# Patient Record
Sex: FEMALE | Race: WHITE | NOT HISPANIC OR LATINO | Employment: OTHER | URBAN - NONMETROPOLITAN AREA
[De-identification: names, ages, dates, MRNs, and addresses within clinical notes are randomized per-mention and may not be internally consistent; named-entity substitution may affect disease eponyms.]

---
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
## 2020-05-26 ENCOUNTER — TELEPHONE (OUTPATIENT)
Dept: FAMILY MEDICINE | Facility: OTHER | Age: 20
End: 2020-05-26

## 2020-05-26 DIAGNOSIS — L23.7 CONTACT DERMATITIS DUE TO POISON IVY: Primary | ICD-10-CM

## 2020-05-26 RX ORDER — PREDNISONE 20 MG/1
TABLET ORAL
Qty: 30 TABLET | Refills: 0 | Status: SHIPPED | OUTPATIENT
Start: 2020-05-26 | End: 2020-10-30

## 2020-05-26 NOTE — TELEPHONE ENCOUNTER
Started two nights ago, spreading now, using calamine lotion, not helping, is all over body.  Does not have insurance right now.  Lora Shepherd LPN ...... 5/26/2020 2:28 PM

## 2020-05-26 NOTE — TELEPHONE ENCOUNTER
Sent prednisone to [facility] pharmacy for treatment. Please take in the morning.   Return to clinic with change/worsening of symptoms.   Gemini Alvarado PA-C ..................5/26/2020 2:34 PM

## 2020-09-24 ENCOUNTER — ALLIED HEALTH/NURSE VISIT (OUTPATIENT)
Dept: FAMILY MEDICINE | Facility: OTHER | Age: 20
End: 2020-09-24
Payer: OTHER GOVERNMENT

## 2020-09-24 DIAGNOSIS — Z20.822 ENCOUNTER FOR LABORATORY TESTING FOR COVID-19 VIRUS: Primary | ICD-10-CM

## 2020-09-24 PROCEDURE — U0003 INFECTIOUS AGENT DETECTION BY NUCLEIC ACID (DNA OR RNA); SEVERE ACUTE RESPIRATORY SYNDROME CORONAVIRUS 2 (SARS-COV-2) (CORONAVIRUS DISEASE [COVID-19]), AMPLIFIED PROBE TECHNIQUE, MAKING USE OF HIGH THROUGHPUT TECHNOLOGIES AS DESCRIBED BY CMS-2020-01-R: HCPCS | Mod: ZL

## 2020-09-24 PROCEDURE — C9803 HOPD COVID-19 SPEC COLLECT: HCPCS

## 2020-09-24 NOTE — PROGRESS NOTES
Patient swabbed for COVID-19 testing for fever and chills  Sergio Azar LPN on 9/24/2020 at 11:41 AM

## 2020-09-25 LAB
SARS-COV-2 RNA SPEC QL NAA+PROBE: NOT DETECTED
SPECIMEN SOURCE: NORMAL

## 2020-10-24 NOTE — TELEPHONE ENCOUNTER
Patient Information     Patient Name MRN Mya Clark [id number] Female 2000      Telephone Encounter by Wandy Valentine MD at 2017  4:30 PM     Author:  Wandy Valentine MD Service:  (none) Author Type:  Physician     Filed:  2017  4:30 PM Encounter Date:  2017 Status:  Signed     :  Wandy Valentine MD (Physician)            Please call mom and let her know she needs to be seen again.  Signed by Wandy Valentine MD .....2017 4:30 PM              numerical 0-10 No

## 2020-10-30 ENCOUNTER — OFFICE VISIT (OUTPATIENT)
Dept: FAMILY MEDICINE | Facility: OTHER | Age: 20
End: 2020-10-30
Attending: PHYSICIAN ASSISTANT
Payer: COMMERCIAL

## 2020-10-30 VITALS
BODY MASS INDEX: 23.59 KG/M2 | HEIGHT: 65 IN | DIASTOLIC BLOOD PRESSURE: 86 MMHG | TEMPERATURE: 97 F | RESPIRATION RATE: 16 BRPM | SYSTOLIC BLOOD PRESSURE: 122 MMHG | WEIGHT: 141.6 LBS | OXYGEN SATURATION: 97 % | HEART RATE: 73 BPM

## 2020-10-30 DIAGNOSIS — Z01.419 PAP TEST, AS PART OF ROUTINE GYNECOLOGICAL EXAMINATION: Primary | ICD-10-CM

## 2020-10-30 DIAGNOSIS — F41.9 SEVERE ANXIETY: ICD-10-CM

## 2020-10-30 PROCEDURE — 99214 OFFICE O/P EST MOD 30 MIN: CPT | Performed by: PHYSICIAN ASSISTANT

## 2020-10-30 PROCEDURE — 88142 CYTOPATH C/V THIN LAYER: CPT | Performed by: PHYSICIAN ASSISTANT

## 2020-10-30 PROCEDURE — G0123 SCREEN CERV/VAG THIN LAYER: HCPCS | Performed by: PHYSICIAN ASSISTANT

## 2020-10-30 RX ORDER — ESCITALOPRAM OXALATE 5 MG/1
5 TABLET ORAL DAILY
Qty: 30 TABLET | Refills: 1 | Status: SHIPPED | OUTPATIENT
Start: 2020-10-30 | End: 2020-12-03

## 2020-10-30 SDOH — HEALTH STABILITY: MENTAL HEALTH: HOW MANY STANDARD DRINKS CONTAINING ALCOHOL DO YOU HAVE ON A TYPICAL DAY?: NOT ASKED

## 2020-10-30 SDOH — HEALTH STABILITY: MENTAL HEALTH: HOW OFTEN DO YOU HAVE A DRINK CONTAINING ALCOHOL?: MONTHLY OR LESS

## 2020-10-30 SDOH — HEALTH STABILITY: MENTAL HEALTH: HOW OFTEN DO YOU HAVE 6 OR MORE DRINKS ON ONE OCCASION?: NOT ASKED

## 2020-10-30 ASSESSMENT — PATIENT HEALTH QUESTIONNAIRE - PHQ9
SUM OF ALL RESPONSES TO PHQ QUESTIONS 1-9: 6
5. POOR APPETITE OR OVEREATING: NEARLY EVERY DAY

## 2020-10-30 ASSESSMENT — ANXIETY QUESTIONNAIRES
2. NOT BEING ABLE TO STOP OR CONTROL WORRYING: NEARLY EVERY DAY
1. FEELING NERVOUS, ANXIOUS, OR ON EDGE: NEARLY EVERY DAY
6. BECOMING EASILY ANNOYED OR IRRITABLE: NEARLY EVERY DAY
5. BEING SO RESTLESS THAT IT IS HARD TO SIT STILL: NEARLY EVERY DAY
GAD7 TOTAL SCORE: 21
7. FEELING AFRAID AS IF SOMETHING AWFUL MIGHT HAPPEN: NEARLY EVERY DAY
3. WORRYING TOO MUCH ABOUT DIFFERENT THINGS: NEARLY EVERY DAY
IF YOU CHECKED OFF ANY PROBLEMS ON THIS QUESTIONNAIRE, HOW DIFFICULT HAVE THESE PROBLEMS MADE IT FOR YOU TO DO YOUR WORK, TAKE CARE OF THINGS AT HOME, OR GET ALONG WITH OTHER PEOPLE: EXTREMELY DIFFICULT

## 2020-10-30 ASSESSMENT — PAIN SCALES - GENERAL: PAINLEVEL: NO PAIN (0)

## 2020-10-30 ASSESSMENT — MIFFLIN-ST. JEOR: SCORE: 1408.17

## 2020-10-30 NOTE — PROGRESS NOTES
"Nursing Notes:   Anny Mc  10/30/2020  8:15 AM  Addendum  Chief Complaint   Patient presents with     Gyn Exam     Patient presents to clinic for pap. She also states she would like to discuss anxiety medication and the implant in her arm.     Initial /86 (BP Location: Right arm, Patient Position: Sitting, Cuff Size: Adult Regular)   Pulse 73   Temp 97  F (36.1  C) (Tympanic)   Resp 16   Ht 1.651 m (5' 5\")   Wt 64.2 kg (141 lb 9.6 oz)   SpO2 97%   Breastfeeding No   BMI 23.56 kg/m   Estimated body mass index is 23.56 kg/m  as calculated from the following:    Height as of this encounter: 1.651 m (5' 5\").    Weight as of this encounter: 64.2 kg (141 lb 9.6 oz).    Medication Reconciliation: complete      Gemini Alvarado PA-C        HPI: Mya Garcia who presents for a pap exam.  Concerns include: Has a nexplanon that was placed 11/2/2018.  Interested in possibly having it removed.  She is concerned that it may be increasing her anxiety.  Patient did have history of anxiety prior to the Nexplanon placed.  Unsure if it is exacerbating her anxiety.  Periods are irregular with the Nexplanon.    Patient is history of anxiety.  Previously was on sertraline and fluoxetine.  Feels like her mind does not shut off.  Constantly racing.  Has a panic attack a few times per month.  Able to breathe through the panic attacks.  Feels constantly worried.  No depressed thoughts.  No suicidal or homicidal ideation.  Keeping food and fluids down.    No LMP recorded.   Contraception: Has a nexplanon that was placed 11/2/2018  Risk for STI?: no concerns  Last pap: none, needs 1  Any hx of abnormal paps:  none  FH of early CA?: no  Cholesterol/DM concerns/screening: no history of screening, declines blood work today  Tobacco?: none  Calcium intake: dairy free, MTV  Immunizations: declines flu vaccine today    Patient Active Problem List    Diagnosis Date Noted     Anxiety state 01/14/2011     Priority: Medium " "      Past Medical History:   Diagnosis Date     Anxiety disorder      Pneumonia     History of left lower lobe pneumonia times two.       Past Surgical History:   Procedure Laterality Date     NO HISTORY OF SURGERY         Family History   Problem Relation Age of Onset     Other - See Comments Mother         Psychiatric illness,depression and anxiety     Anxiety Disorder Father      Anxiety Disorder Maternal Grandmother      Cancer Maternal Grandfather        Social History     Tobacco Use     Smoking status: Never Smoker     Smokeless tobacco: Never Used   Substance Use Topics     Alcohol use: Yes     Frequency: Monthly or less       Current Outpatient Medications   Medication Sig Dispense Refill     escitalopram (LEXAPRO) 5 MG tablet Take 1 tablet (5 mg) by mouth daily 30 tablet 1       No Known Allergies    REVIEW OF SYSTEMS:  Refer to HPI.    PHYSICAL EXAM:  /86 (BP Location: Right arm, Patient Position: Sitting, Cuff Size: Adult Regular)   Pulse 73   Temp 97  F (36.1  C) (Tympanic)   Resp 16   Ht 1.651 m (5' 5\")   Wt 64.2 kg (141 lb 9.6 oz)   SpO2 97%   Breastfeeding No   BMI 23.56 kg/m    CONSTITUTIONAL:  Alert,cooperative, NAD.  BREASTS: No skin abnormalities, no erythema.  No discrete masses.  No nipple discharge, no axillary, supra- or infraclavicular LA.   CARDIOVASCULAR: Regular,S1, S2.  No S3 or S4.  No murmur/gallop/rub.  No peripheral edema.  RESPIRATORY: CTA bilaterally, no wheezes, rhonchi or rales.  GI: Bowel sounds wnl.  Soft, nontender, nondistended.  No masses or HSM.  No rebound or guarding.  : Vulva: normal, no lesions or discharge  Urethral meatus: normal size and location, no lesions or discharge  Urethra: no tenderness or masses  Bladder: no fullness or tenderness  Vagina: normal appearance, no abnormal discharge, no lesions.  No evidence of cystocele or rectocele.  Cervix: normal appearance, no lesions, no abnormal discharge, no cervical motion tenderness  Uterus: normal " size and position, mobile, non-tender  Adnexa: no palpable masses bilaterally. No cervical motion tenderness.  Pap smear obtained: yes  MSKEL: Grossly normal ROM.  No clubbing.  INTEGUMENTARY:  Warm, dry.  No rash noted on exposed skin.  NEUROLOGIC: Facies symmetric.  Grossly normal movement and tone.  No tremor.  PSYCHIATRIC: Affect normal.  Speech fluent.   General appearance:appropriately dressed and well groomed  Attitude: cooperative  Behavior: normal  Eye Contact: normal  Speech: normal  Orientation: oriented to person, place, time and situation  Mood:  admits no sadness and moderate to severe anxiety  Affect: Mood Congruent  Thought Process: clear  Suicidal or Homicidal Ideation: reports no thoughts or intentions  Hallucination: no     PHQ Depression Screen  PHQ-9 SCORE 5/23/2016 4/4/2018 10/30/2020   PHQ-9 Total Score 5 5 6     CINDI-7 SCORE 8/28/2017 4/4/2018 10/30/2020   Total Score 17 4 21         Labs:  No results found for any visits on 10/30/20.    ASSESSMENT AND PLAN:      ICD-10-CM    1. Pap test, as part of routine gynecological examination  Z01.419 Pap Screen Thin Prep only - recommended age 21 - 24 years   2. Severe anxiety  F41.9 escitalopram (LEXAPRO) 5 MG tablet         Completed Pap test for cervical cancer screening.  Results are pending.    Discussed Nexplanon at length.  Patient would prefer to leave the Nexplanon in place at this time.  She will let us know she would like to switch to a different form of birth control.    Anxiety:  Started on lexapro.  Gave side effect profile.  Encouraged good diet and exercise.   Encouraged to see a counselor.   Return in 4-6 weeks for recheck.   Return to clinic with change/worsening of symptoms.     Greater than 25 minutes were spent in counseling and coordination of care.     Relevant cancer screening discussed.    Counseled on healthy diet, Calcium and vitamin D intake, and exercise.    Patient Instructions     Healthy Strategies  1. Eat at least 3  "meals a day and never skip breakfast.  2. Eat more slowly.  3. Decrease portion size.  4. Provide structure by using meal replacement bars or shakes, and/or low calorie frozen meals.  5. For good nutrition incorporate fruit, vegetables, whole grains, lean protein, and low-fat dairy.  6. Remove trigger foods from yourenvironment to avoid impulse eating.  7. Increase physical activity: get a pedometer and aim for 10,000 steps a day or 30-35 minutes of activity 5 days per week.  8. Weigh yourself daily or at least weekly.  9. Keep a record of what you eat and your activity.  10. Establish a support system such as afriend, group or program.    11. Read Viral Sherman's \"Eat to Live\". Remember it is important to have a minimum of 1200 calories a day, okay to use olive oil, 40 grams of fiber daily. No more than two servings (the size of your palm) of red meat a week.     Please consider the following general health recommendations:    Eat a quality diet (generally, low in simple sugars, starches, cholesterol and saturated fat.)    Please get 1200 mg of calcium in divided doses with 800 units vitamin D in your diet daily. Take supplements as needed to obtain full recommended amounts.     Stay physically active. Regular walking or other exercise is one of the best ways to minimize pain of arthritis; maintain independence and mobility; maintain bone strength; maintain conditioning of your heart. Find something you enjoy and a friend to do it with you.    Maintain ideal weight. Your Body mass index is Body mass index is 23.56 kg/m .. Generally a BMI of 20-25 is considered ideal. Overweight is defined as 25-30, obese is 30-35 and markedly obese is greater than 35.    Apply sun block (SPF 25 or greater) on exposed skin anytime you are out in the sun to prevent skin cancer.     Wear a seatbelt whenever you are in a car.    Obtain a flu shot every fall.    You should have a tetanus booster at least once every 10 years. - due summer " 2021    Check blood sugar annually. Cholesterol annually unless you have had a normal level when last checked within 5 years.     I recommend that you have a general physical exam every year. You should have a pap test every 3 years between the ages of 21 and 30 and every 3-5 years between the ages of 30 and 65 depending on your test unless you have had previous abnormal pap smears, (in these cases the exams and PAP's should be done on a schedule as recommended by your primary care provider). If you have had hysterectomy in the past, your future Pap plan may be different.       Anxiety:  Started on lexapro.  Encouraged good diet and exercise.   Encouraged to see a counselor.   Return in 4-6 weeks for recheck.   Return to clinic with change/worsening of symptoms.       Suicide Emergency First call for help:  [phone] [phone]    Depression: Tips to Help Yourself  As your healthcare providers help treat your depression, you can also help yourself. Keep in mind that your illness affects you emotionally, physically, mentally, and socially. So full recovery will take time. Take care of your body and your soul, and be patient with yourself as you get better.    Self-care    Educate yourself. Read about treatment and medicine options. If you have the energy, attend local conferences or support groups. Keep a list of useful websites and helpful books and use them as needed. This illness is not your fault. Don t blame yourself for your depression.    Manage early symptoms. If you notice symptoms returning, experience triggers, or identify other factors that may lead to a depressive episode, get help as soon as possible. Ask trusted friends and family to monitor your behavior and let you know if they see anything of concern.    Work with your provider. Find a provider you can trust. Communicate honestly with that person and share information on your treatment for depression and your reaction to medicines.    Be  prepared for a crisis. Know what to do if you experience a crisis. Keep the phone number of a crisis hotline and know the location of your community's urgent care centers and the closest emergency department.    Hold off on big decisions. Depression can cloud your judgment. So wait until you feel better before making major life decisions, such as changing jobs, moving, or getting  or .    Be patient. Recovering from depression is a process. Don t be discouraged if it takes some time to feel better.    Keep it simple. Depression saps your energy and concentration. So you won t be able to do all the things you used to do. Set small goals and do what you can.    Be with others. Don t isolate yourself--you ll only feel worse. Try to be with other people. And take part in fun activities when you can. Go to a movie, ballgame, Gnosticist service, or social event. Talk openly with people you can trust. And accept help when it s offered.  Take care of your body  People with depression often lose the desire to take care of themselves. That only makes their problems worse. During treatment and afterward, make a point to:    Exercise. It s a great way to take care of your body. And studies have shown that exercise helps fight depression.    Avoid drugs and alcohol. These may ease the pain in the short term. But they ll only make your problems worse in the long run.    Get relief from stress. Ask your healthcare provider for relaxation exercises and techniques to help relieve stress.    Eat right. A balanced and healthy diet helps keep your body healthy.  Date Last Reviewed: 1/1/2017 2000-2017 [employer]. [address] [place] [address], [place] [address]. All rights reserved. This information is not intended as a substitute for professional medical care. Always follow your healthcare professional's instructions.         Treating Anxiety Disorders with Therapy    If you have an anxiety disorder, you don t  have to suffer anymore. Treatment is available. Therapy (also called counseling) is often a helpful treatment for anxiety disorders. With therapy, a specially trained professional (therapist) helps you face and learn to manage your anxiety. Therapy can be short-term or long-term depending on your needs. In some cases, medicine may also be prescribed with therapy. It may take time before you notice how much therapy is helping, but stick with it. With therapy, you can feel better.  Cognitive behavioral therapy (CBT)  Cognitive behavioral therapy (CBT) teaches you to manage anxiety. It does this by helping you understand how you think and act when you re anxious. Research has shown CBT to be a very effective treatment for anxiety disorders. How CBT is run is almost like a class. It involves homework and activities to build skills that teach you to cope with anxiety step by step. It can be done in a group or one-on-one, and often takes place for a set number of sessions. CBT has two main parts:    Cognitive therapy helps you identify the negative, irrational thoughts that occur with your anxiety. You ll learn to replace these with more positive, realistic thoughts.    Behavioral therapy helps you change how you react to anxiety. You ll learn coping skills and methods for relaxing to help you better deal with anxiety.  Other forms of therapy  Other therapy methods may work better for you than CBT. Or, you may move from CBT to another form of therapy as your treatment needs change. This may mean meeting with a therapist by yourself or in a group. Therapy can also help you work through problems in your life, such as drug or alcohol dependence, that may be making your anxiety worse.  Getting better takes time  Therapy will help you feel better and teach you skills to help manage anxiety long term. But change doesn t happen right away. It takes a commitment from you. And treatment only works if you learn to face the causes  of your anxiety. So, you might feel worse before you feel better. This can sometimes make it hard to stick with it. But remember: Therapy is a very effective treatment. The results will be well worth it.  Helping yourself  If anxiety is wearing you down, here are some things you can do to cope:    Check with your doctor and rule out any physical problems that may be causing the anxiety symptoms.    If an anxiety disorder is diagnosed, seek mental healthcare. This is an illness and it can respond to treatment. Most types of anxiety disorders will respond to talk therapy and medicine.    Educate yourself about anxiety disorders. Keep track of helpful online resources and books you can use during stressful periods.    Try stress management techniques such as meditation.    Consider online or in-person support groups.    Don t fight your feelings. Anxiety feeds itself. The more you worry about it, the worse it gets. Instead, try to identify what might have triggered your anxiety. Then try to put this threat in perspective.    Keep in mind that you can t control everything about a situation. Change what you can and let the rest take its course.    Exercise -- it s a great way to relieve tension and help your body feel relaxed.    Examine your life for stress, and try to find ways to reduce it.    Avoid caffeine and nicotine, which can make anxiety symptoms worse.    Fight the temptation to turn to alcohol or unprescribed drugs for relief. They only make things worse in the long run.   Date Last Reviewed: 1/1/2017 2000-2017 [employer]. [address] [place] [address]. All rights reserved. This information is not intended as a substitute for professional medical care. Always follow your healthcare professional's instructions.               Gemini Alvarado PA-C PA-C..................10/30/2020 8:16 AM

## 2020-10-30 NOTE — PATIENT INSTRUCTIONS
"Healthy Strategies  1. Eat at least 3 meals a day and never skip breakfast.  2. Eat more slowly.  3. Decrease portion size.  4. Provide structure by using meal replacement bars or shakes, and/or low calorie frozen meals.  5. For good nutrition incorporate fruit, vegetables, whole grains, lean protein, and low-fat dairy.  6. Remove trigger foods from yourenvironment to avoid impulse eating.  7. Increase physical activity: get a pedometer and aim for 10,000 steps a day or 30-35 minutes of activity 5 days per week.  8. Weigh yourself daily or at least weekly.  9. Keep a record of what you eat and your activity.  10. Establish a support system such as afriend, group or program.    11. Read Viral Sherman's \"Eat to Live\". Remember it is important to have a minimum of 1200 calories a day, okay to use olive oil, 40 grams of fiber daily. No more than two servings (the size of your palm) of red meat a week.     Please consider the following general health recommendations:    Eat a quality diet (generally, low in simple sugars, starches, cholesterol and saturated fat.)    Please get 1200 mg of calcium in divided doses with 800 units vitamin D in your diet daily. Take supplements as needed to obtain full recommended amounts.     Stay physically active. Regular walking or other exercise is one of the best ways to minimize pain of arthritis; maintain independence and mobility; maintain bone strength; maintain conditioning of your heart. Find something you enjoy and a friend to do it with you.    Maintain ideal weight. Your Body mass index is Body mass index is 23.56 kg/m .. Generally a BMI of 20-25 is considered ideal. Overweight is defined as 25-30, obese is 30-35 and markedly obese is greater than 35.    Apply sun block (SPF 25 or greater) on exposed skin anytime you are out in the sun to prevent skin cancer.     Wear a seatbelt whenever you are in a car.    Obtain a flu shot every fall.    You should have a tetanus booster at " least once every 10 years. - due summer 2021    Check blood sugar annually. Cholesterol annually unless you have had a normal level when last checked within 5 years.     I recommend that you have a general physical exam every year. You should have a pap test every 3 years between the ages of 21 and 30 and every 3-5 years between the ages of 30 and 65 depending on your test unless you have had previous abnormal pap smears, (in these cases the exams and PAP's should be done on a schedule as recommended by your primary care provider). If you have had hysterectomy in the past, your future Pap plan may be different.       Anxiety:  Started on lexapro.  Encouraged good diet and exercise.   Encouraged to see a counselor.   Return in 4-6 weeks for recheck.   Return to clinic with change/worsening of symptoms.       Suicide Emergency First call for help:  [phone]    Depression: Tips to Help Yourself  As your healthcare providers help treat your depression, you can also help yourself. Keep in mind that your illness affects you emotionally, physically, mentally, and socially. So full recovery will take time. Take care of your body and your soul, and be patient with yourself as you get better.    Self-care    Educate yourself. Read about treatment and medicine options. If you have the energy, attend local conferences or support groups. Keep a list of useful websites and helpful books and use them as needed. This illness is not your fault. Don t blame yourself for your depression.    Manage early symptoms. If you notice symptoms returning, experience triggers, or identify other factors that may lead to a depressive episode, get help as soon as possible. Ask trusted friends and family to monitor your behavior and let you know if they see anything of concern.    Work with your provider. Find a provider you can trust. Communicate honestly with that person and share information on your treatment for depression  and your reaction to medicines.    Be prepared for a crisis. Know what to do if you experience a crisis. Keep the phone number of a crisis hotline and know the location of your community's urgent care centers and the closest emergency department.    Hold off on big decisions. Depression can cloud your judgment. So wait until you feel better before making major life decisions, such as changing jobs, moving, or getting  or .    Be patient. Recovering from depression is a process. Don t be discouraged if it takes some time to feel better.    Keep it simple. Depression saps your energy and concentration. So you won t be able to do all the things you used to do. Set small goals and do what you can.    Be with others. Don t isolate yourself--you ll only feel worse. Try to be with other people. And take part in fun activities when you can. Go to a movie, ballgame, Restorationism service, or social event. Talk openly with people you can trust. And accept help when it s offered.  Take care of your body  People with depression often lose the desire to take care of themselves. That only makes their problems worse. During treatment and afterward, make a point to:    Exercise. It s a great way to take care of your body. And studies have shown that exercise helps fight depression.    Avoid drugs and alcohol. These may ease the pain in the short term. But they ll only make your problems worse in the long run.    Get relief from stress. Ask your healthcare provider for relaxation exercises and techniques to help relieve stress.    Eat right. A balanced and healthy diet helps keep your body healthy.  Date Last Reviewed: 1/1/2017 2000-2017 [employer]. [address] [place] [address], [place] [address]. All rights reserved. This information is not intended as a substitute for professional medical care. Always follow your healthcare professional's instructions.         Treating Anxiety Disorders with Therapy    If  you have an anxiety disorder, you don t have to suffer anymore. Treatment is available. Therapy (also called counseling) is often a helpful treatment for anxiety disorders. With therapy, a specially trained professional (therapist) helps you face and learn to manage your anxiety. Therapy can be short-term or long-term depending on your needs. In some cases, medicine may also be prescribed with therapy. It may take time before you notice how much therapy is helping, but stick with it. With therapy, you can feel better.  Cognitive behavioral therapy (CBT)  Cognitive behavioral therapy (CBT) teaches you to manage anxiety. It does this by helping you understand how you think and act when you re anxious. Research has shown CBT to be a very effective treatment for anxiety disorders. How CBT is run is almost like a class. It involves homework and activities to build skills that teach you to cope with anxiety step by step. It can be done in a group or one-on-one, and often takes place for a set number of sessions. CBT has two main parts:    Cognitive therapy helps you identify the negative, irrational thoughts that occur with your anxiety. You ll learn to replace these with more positive, realistic thoughts.    Behavioral therapy helps you change how you react to anxiety. You ll learn coping skills and methods for relaxing to help you better deal with anxiety.  Other forms of therapy  Other therapy methods may work better for you than CBT. Or, you may move from CBT to another form of therapy as your treatment needs change. This may mean meeting with a therapist by yourself or in a group. Therapy can also help you work through problems in your life, such as drug or alcohol dependence, that may be making your anxiety worse.  Getting better takes time  Therapy will help you feel better and teach you skills to help manage anxiety long term. But change doesn t happen right away. It takes a commitment from you. And treatment only  works if you learn to face the causes of your anxiety. So, you might feel worse before you feel better. This can sometimes make it hard to stick with it. But remember: Therapy is a very effective treatment. The results will be well worth it.  Helping yourself  If anxiety is wearing you down, here are some things you can do to cope:    Check with your doctor and rule out any physical problems that may be causing the anxiety symptoms.    If an anxiety disorder is diagnosed, seek mental healthcare. This is an illness and it can respond to treatment. Most types of anxiety disorders will respond to talk therapy and medicine.    Educate yourself about anxiety disorders. Keep track of helpful online resources and books you can use during stressful periods.    Try stress management techniques such as meditation.    Consider online or in-person support groups.    Don t fight your feelings. Anxiety feeds itself. The more you worry about it, the worse it gets. Instead, try to identify what might have triggered your anxiety. Then try to put this threat in perspective.    Keep in mind that you can t control everything about a situation. Change what you can and let the rest take its course.    Exercise -- it s a great way to relieve tension and help your body feel relaxed.    Examine your life for stress, and try to find ways to reduce it.    Avoid caffeine and nicotine, which can make anxiety symptoms worse.    Fight the temptation to turn to alcohol or unprescribed drugs for relief. They only make things worse in the long run.   Date Last Reviewed: 1/1/2017 2000-2017 [employer]. [address] [place] [address], [place] [address]. All rights reserved. This information is not intended as a substitute for professional medical care. Always follow your healthcare professional's instructions.

## 2020-10-30 NOTE — NURSING NOTE
"Chief Complaint   Patient presents with     Gyn Exam     Patient presents to clinic for pap. She also states she would like to discuss anxiety medication and the implant in her arm.     Initial /86 (BP Location: Right arm, Patient Position: Sitting, Cuff Size: Adult Regular)   Pulse 73   Temp 97  F (36.1  C) (Tympanic)   Resp 16   Ht 1.651 m (5' 5\")   Wt 64.2 kg (141 lb 9.6 oz)   SpO2 97%   Breastfeeding No   BMI 23.56 kg/m   Estimated body mass index is 23.56 kg/m  as calculated from the following:    Height as of this encounter: 1.651 m (5' 5\").    Weight as of this encounter: 64.2 kg (141 lb 9.6 oz).    Medication Reconciliation: complete      Gemini Alvarado PA-C  "

## 2020-10-31 ASSESSMENT — ANXIETY QUESTIONNAIRES: GAD7 TOTAL SCORE: 21

## 2020-11-04 LAB
COPATH REPORT: NORMAL
PAP: NORMAL

## 2020-12-03 ENCOUNTER — VIRTUAL VISIT (OUTPATIENT)
Dept: FAMILY MEDICINE | Facility: OTHER | Age: 20
End: 2020-12-03
Attending: PHYSICIAN ASSISTANT
Payer: COMMERCIAL

## 2020-12-03 DIAGNOSIS — F41.9 SEVERE ANXIETY: Primary | ICD-10-CM

## 2020-12-03 DIAGNOSIS — F32.1 MODERATE MAJOR DEPRESSION (H): ICD-10-CM

## 2020-12-03 PROCEDURE — 99213 OFFICE O/P EST LOW 20 MIN: CPT | Mod: 95 | Performed by: PHYSICIAN ASSISTANT

## 2020-12-03 RX ORDER — ESCITALOPRAM OXALATE 10 MG/1
10 TABLET ORAL DAILY
Qty: 30 TABLET | Refills: 1 | Status: SHIPPED | OUTPATIENT
Start: 2020-12-03 | End: 2022-02-05

## 2020-12-03 ASSESSMENT — ANXIETY QUESTIONNAIRES
6. BECOMING EASILY ANNOYED OR IRRITABLE: NEARLY EVERY DAY
IF YOU CHECKED OFF ANY PROBLEMS ON THIS QUESTIONNAIRE, HOW DIFFICULT HAVE THESE PROBLEMS MADE IT FOR YOU TO DO YOUR WORK, TAKE CARE OF THINGS AT HOME, OR GET ALONG WITH OTHER PEOPLE: SOMEWHAT DIFFICULT
GAD7 TOTAL SCORE: 21
5. BEING SO RESTLESS THAT IT IS HARD TO SIT STILL: NEARLY EVERY DAY
1. FEELING NERVOUS, ANXIOUS, OR ON EDGE: NEARLY EVERY DAY
7. FEELING AFRAID AS IF SOMETHING AWFUL MIGHT HAPPEN: NEARLY EVERY DAY
2. NOT BEING ABLE TO STOP OR CONTROL WORRYING: NEARLY EVERY DAY
3. WORRYING TOO MUCH ABOUT DIFFERENT THINGS: NEARLY EVERY DAY

## 2020-12-03 ASSESSMENT — PATIENT HEALTH QUESTIONNAIRE - PHQ9
SUM OF ALL RESPONSES TO PHQ QUESTIONS 1-9: 7
5. POOR APPETITE OR OVEREATING: NEARLY EVERY DAY

## 2020-12-03 NOTE — PROGRESS NOTES
"Mya Garcia is a 20 year old female who is being evaluated via a billable telephone visit.      The patient has been notified of following:     \"This telephone visit will be conducted via a call between you and your physician/provider. We have found that certain health care needs can be provided without the need for a physical exam.  This service lets us provide the care you need with a short phone conversation.  If a prescription is necessary we can send it directly to your pharmacy.  If lab work is needed we can place an order for that and you can then stop by our lab to have the test done at a later time.    Telephone visits are billed at different rates depending on your insurance coverage. During this emergency period, for some insurers they may be billed the same as an in-person visit.  Please reach out to your insurance provider with any questions.    If during the course of the call the physician/provider feels a telephone visit is not appropriate, you will not be charged for this service.\"    Patient has given verbal consent for Telephone visit?  Yes    What phone number would you like to be contacted at? [phone]      How would you like to obtain your AVS? Anibalhart    Subjective     Mya Garcia is a 20 year old female who presents via phone visit today for the following health issues:    HPI      Patient is currently taking Lexapro for anxiety and depression.  Wondering if she needs to increase her dose.  She has felt more anxious over the last couple weeks.  No new stressors.  Easily distracted.  Lack of interest.  No panic attacks however her heart races at times.  No suicidal or homicidal ideation.  Not seeing a counselor.  Interested in seeing a counselor.  Keeping food and fluids down.  No weight changes.  Mind is constantly racing.  No side effects noted with the medication.      Review of Systems   Constitutional, HEENT, cardiovascular, pulmonary, gi and gu systems are negative, " except as otherwise noted.       Objective          Vitals:  No vitals were obtained today due to virtual visit.    healthy, alert and no distress  PSYCH: Alert and oriented times 3; coherent speech, normal   rate and volume, able to articulate logical thoughts, able   to abstract reason, no tangential thoughts, no hallucinations   or delusions  Her affect is normal. No SI or HI.   RESP: No cough, no audible wheezing, able to talk in full sentences  Remainder of exam unable to be completed due to telephone visits            Assessment/Plan:    Assessment & Plan     Mya was seen today for follow up.    Diagnoses and all orders for this visit:    Severe anxiety  -     escitalopram (LEXAPRO) 10 MG tablet; Take 1 tablet (10 mg) by mouth daily    Moderate major depression (H)  -     escitalopram (LEXAPRO) 10 MG tablet; Take 1 tablet (10 mg) by mouth daily      Anxiety and depression:  Increased lexapro to 10 mg daily.  Gave side effect profile.  Encouraged good diet and exercise.   Encouraged to see a counselor.  Gave list of counselors in the area.  Return in 4-6 weeks for recheck.   Return to clinic with change/worsening of symptoms.           Patient Instructions     Increased lexapro to 10 mg daily.  Encouraged good diet and exercise.   Encouraged to see a counselor.   Return in 4-6 weeks for recheck.   Return to clinic with change/worsening of symptoms.       Suicide Emergency First call for help:  [phone]    Counselors:     [facility]: [phone]    Rochelle French: [phone]    Ana Mariluz: [phone]    Anand French CNP[facility]: [phone]    Irma Zimmer: [phone]    [facility]: [phone]    Mack zAar: [phone]    [phone]    [facility]: [phone]    Anitra Pierre: [phone]    [facility]: [phone]    Adolescent Counseling:    [facility]: [phone]    [facility]: [phone]    [facility]: [phone]    Depression: Tips to Help Yourself  As your healthcare providers help treat your depression, you can also help yourself. Keep in mind that your illness affects you emotionally, physically, mentally, and socially. So full recovery will take time. Take care of your body and your soul, and be patient with yourself as you get better.    Self-care    Educate yourself. Read about treatment and medicine options. If you have the energy, attend local conferences or support groups. Keep a list of useful websites and helpful books and use them as needed. This illness is not your fault. Don t blame yourself for your depression.    Manage early symptoms. If you notice symptoms returning, experience triggers, or identify other factors that may lead to a depressive episode, get help as soon as possible. Ask trusted friends and family to monitor your behavior and let you know if they see anything of concern.    Work with your provider. Find a provider you can trust. Communicate honestly with that person and share information on your treatment for depression and your reaction to medicines.    Be prepared for a crisis. Know what to do if you experience a crisis. Keep the phone number of a crisis hotline and know the location of your community's urgent care centers and the closest emergency department.    Hold off on big decisions. Depression can cloud your judgment. So wait until you feel better before making major life decisions, such as changing jobs, moving, or getting  or .    Be patient. Recovering from depression is a process. Don t be discouraged if it takes some time to feel better.    Keep it simple. Depression saps your energy and concentration. So you won t be able to do all the things you used to do. Set small goals and do what you can.    Be with others. Don t  isolate yourself--you ll only feel worse. Try to be with other people. And take part in fun activities when you can. Go to a movie, ballgame, Yarsanism service, or social event. Talk openly with people you can trust. And accept help when it s offered.  Take care of your body  People with depression often lose the desire to take care of themselves. That only makes their problems worse. During treatment and afterward, make a point to:    Exercise. It s a great way to take care of your body. And studies have shown that exercise helps fight depression.    Avoid drugs and alcohol. These may ease the pain in the short term. But they ll only make your problems worse in the long run.    Get relief from stress. Ask your healthcare provider for relaxation exercises and techniques to help relieve stress.    Eat right. A balanced and healthy diet helps keep your body healthy.  Date Last Reviewed: 1/1/2017 2000-2017 [employer]. [address] [place] [address]. All rights reserved. This information is not intended as a substitute for professional medical care. Always follow your healthcare professional's instructions.         Treating Anxiety Disorders with Therapy    If you have an anxiety disorder, you don t have to suffer anymore. Treatment is available. Therapy (also called counseling) is often a helpful treatment for anxiety disorders. With therapy, a specially trained professional (therapist) helps you face and learn to manage your anxiety. Therapy can be short-term or long-term depending on your needs. In some cases, medicine may also be prescribed with therapy. It may take time before you notice how much therapy is helping, but stick with it. With therapy, you can feel better.  Cognitive behavioral therapy (CBT)  Cognitive behavioral therapy (CBT) teaches you to manage anxiety. It does this by helping you understand how you think and act when you re anxious. Research has shown CBT to be a very  effective treatment for anxiety disorders. How CBT is run is almost like a class. It involves homework and activities to build skills that teach you to cope with anxiety step by step. It can be done in a group or one-on-one, and often takes place for a set number of sessions. CBT has two main parts:    Cognitive therapy helps you identify the negative, irrational thoughts that occur with your anxiety. You ll learn to replace these with more positive, realistic thoughts.    Behavioral therapy helps you change how you react to anxiety. You ll learn coping skills and methods for relaxing to help you better deal with anxiety.  Other forms of therapy  Other therapy methods may work better for you than CBT. Or, you may move from CBT to another form of therapy as your treatment needs change. This may mean meeting with a therapist by yourself or in a group. Therapy can also help you work through problems in your life, such as drug or alcohol dependence, that may be making your anxiety worse.  Getting better takes time  Therapy will help you feel better and teach you skills to help manage anxiety long term. But change doesn t happen right away. It takes a commitment from you. And treatment only works if you learn to face the causes of your anxiety. So, you might feel worse before you feel better. This can sometimes make it hard to stick with it. But remember: Therapy is a very effective treatment. The results will be well worth it.  Helping yourself  If anxiety is wearing you down, here are some things you can do to cope:    Check with your doctor and rule out any physical problems that may be causing the anxiety symptoms.    If an anxiety disorder is diagnosed, seek mental healthcare. This is an illness and it can respond to treatment. Most types of anxiety disorders will respond to talk therapy and medicine.    Educate yourself about anxiety disorders. Keep track of helpful online resources and books you can use during  stressful periods.    Try stress management techniques such as meditation.    Consider online or in-person support groups.    Don t fight your feelings. Anxiety feeds itself. The more you worry about it, the worse it gets. Instead, try to identify what might have triggered your anxiety. Then try to put this threat in perspective.    Keep in mind that you can t control everything about a situation. Change what you can and let the rest take its course.    Exercise -- it s a great way to relieve tension and help your body feel relaxed.    Examine your life for stress, and try to find ways to reduce it.    Avoid caffeine and nicotine, which can make anxiety symptoms worse.    Fight the temptation to turn to alcohol or unprescribed drugs for relief. They only make things worse in the long run.   Date Last Reviewed: 1/1/2017 2000-2017 [employer]. [address] [place] [address]. All rights reserved. This information is not intended as a substitute for professional medical care. Always follow your healthcare professional's instructions.             Return in about 4 weeks (around 12/31/2020) for Recheck.    Gemini Alvarado PA-C  [facility]    Phone call duration:  7 minutes

## 2020-12-03 NOTE — PATIENT INSTRUCTIONS
Increased lexapro to 10 mg daily.  Encouraged good diet and exercise.   Encouraged to see a counselor.   Return in 4-6 weeks for recheck.   Return to clinic with change/worsening of symptoms.       Suicide Emergency First call for help:  [phone]    Counselors:     [facility]: [phone]    Rochelle Manolo: [phone]    Ana Mcgraw: [phone]    Anand French CNP, [facility]: [phone]    Irma Zimmer: [phone]    [facility]: [phone]    Mack Doe: [phone]    [phone]    [facility]: [phone]    Anitra Pierre: [phone]    [facility]: [phone]    Adolescent Counseling:   [facility]: [phone]    [facility]: [phone]    [facility]: [phone]    Depression: Tips to Help Yourself  As your healthcare providers help treat your depression, you can also help yourself. Keep in mind that your illness affects you emotionally, physically, mentally, and socially. So full recovery will take time. Take care of your body and your soul, and be patient with yourself as you get better.    Self-care    Educate yourself. Read about treatment and medicine options. If you have the energy, attend local conferences or support groups. Keep a list of useful websites and helpful books and use them as needed. This illness is not your fault. Don t blame yourself for your depression.    Manage early symptoms. If you notice symptoms returning, experience triggers, or identify other factors that may lead to a depressive episode, get help as soon as possible. Ask trusted friends and family to monitor your behavior and let you know if they see anything of concern.    Work with your provider. Find a provider you can trust. Communicate honestly with that person and share information on your treatment for  depression and your reaction to medicines.    Be prepared for a crisis. Know what to do if you experience a crisis. Keep the phone number of a crisis hotline and know the location of your community's urgent care centers and the closest emergency department.    Hold off on big decisions. Depression can cloud your judgment. So wait until you feel better before making major life decisions, such as changing jobs, moving, or getting  or .    Be patient. Recovering from depression is a process. Don t be discouraged if it takes some time to feel better.    Keep it simple. Depression saps your energy and concentration. So you won t be able to do all the things you used to do. Set small goals and do what you can.    Be with others. Don t isolate yourself--you ll only feel worse. Try to be with other people. And take part in fun activities when you can. Go to a movie, ballgame, [employer] service, or social event. Talk openly with people you can trust. And accept help when it s offered.  Take care of your body  People with depression often lose the desire to take care of themselves. That only makes their problems worse. During treatment and afterward, make a point to:    Exercise. It s a great way to take care of your body. And studies have shown that exercise helps fight depression.    Avoid drugs and alcohol. These may ease the pain in the short term. But they ll only make your problems worse in the long run.    Get relief from stress. Ask your healthcare provider for relaxation exercises and techniques to help relieve stress.    Eat right. A balanced and healthy diet helps keep your body healthy.  Date Last Reviewed: 1/1/2017 2000-2017 [employer]. [address] [place] [address], [place] [address]. All rights reserved. This information is not intended as a substitute for professional medical care. Always follow your healthcare professional's instructions.         Treating Anxiety Disorders with  Therapy    If you have an anxiety disorder, you don t have to suffer anymore. Treatment is available. Therapy (also called counseling) is often a helpful treatment for anxiety disorders. With therapy, a specially trained professional (therapist) helps you face and learn to manage your anxiety. Therapy can be short-term or long-term depending on your needs. In some cases, medicine may also be prescribed with therapy. It may take time before you notice how much therapy is helping, but stick with it. With therapy, you can feel better.  Cognitive behavioral therapy (CBT)  Cognitive behavioral therapy (CBT) teaches you to manage anxiety. It does this by helping you understand how you think and act when you re anxious. Research has shown CBT to be a very effective treatment for anxiety disorders. How CBT is run is almost like a class. It involves homework and activities to build skills that teach you to cope with anxiety step by step. It can be done in a group or one-on-one, and often takes place for a set number of sessions. CBT has two main parts:    Cognitive therapy helps you identify the negative, irrational thoughts that occur with your anxiety. You ll learn to replace these with more positive, realistic thoughts.    Behavioral therapy helps you change how you react to anxiety. You ll learn coping skills and methods for relaxing to help you better deal with anxiety.  Other forms of therapy  Other therapy methods may work better for you than CBT. Or, you may move from CBT to another form of therapy as your treatment needs change. This may mean meeting with a therapist by yourself or in a group. Therapy can also help you work through problems in your life, such as drug or alcohol dependence, that may be making your anxiety worse.  Getting better takes time  Therapy will help you feel better and teach you skills to help manage anxiety long term. But change doesn t happen right away. It takes a commitment from you. And  treatment only works if you learn to face the causes of your anxiety. So, you might feel worse before you feel better. This can sometimes make it hard to stick with it. But remember: Therapy is a very effective treatment. The results will be well worth it.  Helping yourself  If anxiety is wearing you down, here are some things you can do to cope:    Check with your doctor and rule out any physical problems that may be causing the anxiety symptoms.    If an anxiety disorder is diagnosed, seek mental healthcare. This is an illness and it can respond to treatment. Most types of anxiety disorders will respond to talk therapy and medicine.    Educate yourself about anxiety disorders. Keep track of helpful online resources and books you can use during stressful periods.    Try stress management techniques such as meditation.    Consider online or in-person support groups.    Don t fight your feelings. Anxiety feeds itself. The more you worry about it, the worse it gets. Instead, try to identify what might have triggered your anxiety. Then try to put this threat in perspective.    Keep in mind that you can t control everything about a situation. Change what you can and let the rest take its course.    Exercise -- it s a great way to relieve tension and help your body feel relaxed.    Examine your life for stress, and try to find ways to reduce it.    Avoid caffeine and nicotine, which can make anxiety symptoms worse.    Fight the temptation to turn to alcohol or unprescribed drugs for relief. They only make things worse in the long run.   Date Last Reviewed: 1/1/2017 2000-2017 [employer]. [address] [place] [address], [place] [address]. All rights reserved. This information is not intended as a substitute for professional medical care. Always follow your healthcare professional's instructions.

## 2020-12-04 ASSESSMENT — ANXIETY QUESTIONNAIRES: GAD7 TOTAL SCORE: 21

## 2021-01-03 ENCOUNTER — HEALTH MAINTENANCE LETTER (OUTPATIENT)
Age: 21
End: 2021-01-03

## 2021-04-22 NOTE — TELEPHONE ENCOUNTER
Patient called and stated she has Poison Ivy all overher body and would like a call in regards to what to do thank you   normal rate, regular rhythm, and no murmur.

## 2021-04-30 ENCOUNTER — ALLIED HEALTH/NURSE VISIT (OUTPATIENT)
Dept: FAMILY MEDICINE | Facility: OTHER | Age: 21
End: 2021-04-30
Attending: FAMILY MEDICINE
Payer: COMMERCIAL

## 2021-04-30 DIAGNOSIS — J02.9 SORE THROAT: Primary | ICD-10-CM

## 2021-04-30 PROCEDURE — C9803 HOPD COVID-19 SPEC COLLECT: HCPCS

## 2021-04-30 PROCEDURE — U0005 INFEC AGEN DETEC AMPLI PROBE: HCPCS | Mod: ZL | Performed by: FAMILY MEDICINE

## 2021-04-30 PROCEDURE — U0003 INFECTIOUS AGENT DETECTION BY NUCLEIC ACID (DNA OR RNA); SEVERE ACUTE RESPIRATORY SYNDROME CORONAVIRUS 2 (SARS-COV-2) (CORONAVIRUS DISEASE [COVID-19]), AMPLIFIED PROBE TECHNIQUE, MAKING USE OF HIGH THROUGHPUT TECHNOLOGIES AS DESCRIBED BY CMS-2020-01-R: HCPCS | Mod: ZL | Performed by: FAMILY MEDICINE

## 2021-05-01 LAB
LABORATORY COMMENT REPORT: NORMAL
SARS-COV-2 RNA RESP QL NAA+PROBE: NEGATIVE
SARS-COV-2 RNA RESP QL NAA+PROBE: NORMAL
SPECIMEN SOURCE: NORMAL
SPECIMEN SOURCE: NORMAL

## 2021-05-04 ENCOUNTER — TELEPHONE (OUTPATIENT)
Dept: FAMILY MEDICINE | Facility: OTHER | Age: 21
End: 2021-05-04

## 2021-05-04 NOTE — TELEPHONE ENCOUNTER
Patient had a negative Covid test on Friday last week.  She now has more symptoms and is feeling ill.  Can she get a Flu test or what should she do next?      Gemini Azar on 5/4/2021 at 11:26 AM

## 2021-05-04 NOTE — TELEPHONE ENCOUNTER
"Patient had a negative Covid test on Friday last week.  She now has more symptoms and is feeling ill.  Can she get a Flu test or what should she do next?     Call to patient following message: Verified name/.   Pt reports close contact to positive covid person on 2021. Tested on 21 following sore throat.   Test was negative. 21, reports dry cough, temp 99.9, runny and stuffy nose, body chills, \"foggy\".  Pt describes breathing, \"on-key\", needs a routine/focus but relates this to anxiety of thinking it is covid. Pt able to hold conversation during call. No distress observed. Denies fever at time of call 98.0.     Reviewed standing orders, patient eligible for retest for covid.  Explained for flu test, would need to be seen either in clinic, rapid clinic or if feeling severe, ED.   Pt states she would like to retest for Covid. Pt instructed if symptoms worsen, encourage a provider evaluation wither clinic, rapid clinic or ED. Pt agrees with plan. Transferred to scheduling for covid test appointment.  Routing to provider.   Crissy Beckwith RN ,....................  2021   2:10 PM    "

## 2021-05-07 ENCOUNTER — NURSE TRIAGE (OUTPATIENT)
Dept: FAMILY MEDICINE | Facility: OTHER | Age: 21
End: 2021-05-07

## 2021-05-07 NOTE — TELEPHONE ENCOUNTER
Patient informed of Gemini Alvarado's recommendations     Alfreda Fraga CMA on 5/7/2021 at 2:20 PM

## 2021-05-07 NOTE — TELEPHONE ENCOUNTER
I would recommend going to the rapid clinic or emergency room to be evaluated.  Gemini Alvarado PA-C.......... 5/7/2021 1:58 PM

## 2021-05-07 NOTE — TELEPHONE ENCOUNTER
"S-(situation): Patient name and date of birth verified. Patient called about her COVID positive/symptoms concerns.    B-(background): 5/4/21 positive COVID results symptoms of SOB worsened today    A-(assessment): temp of 98.0, chills, body ache, Loss of taste/smell, SOB, anxiety, headache, dizziness.    R-(recommendations): Patient encouraged to drink fluids and rest. Patient encourage to go to ED if symptoms worsen.     Routing to provider for second level triage.     Reason for Disposition    MILD difficulty breathing (e.g., minimal/no SOB at rest, SOB with walking, pulse <100)    Additional Information    Negative: SEVERE difficulty breathing (e.g., struggling for each breath, speaks in single words)    Negative: Difficult to awaken or acting confused (e.g., disoriented, slurred speech)    Negative: Bluish (or gray) lips or face now    Negative: Shock suspected (e.g., cold/pale/clammy skin, too weak to stand, low BP, rapid pulse)    Negative: Sounds like a life-threatening emergency to the triager    Negative: SEVERE or constant chest pain or pressure (Exception: mild central chest pain, present only when coughing)    Negative: [1] Headache AND [2] stiff neck (can't touch chin to chest)    Negative: Chest pain or pressure    Negative: Patient sounds very sick or weak to the triager    Negative: Fever > 103 F (39.4 C)    Negative: [1] Fever > 101 F (38.3 C) AND [2] age > 60 years    Negative: [1] Fever > 100.0 F (37.8 C) AND [2] bedridden (e.g., nursing home patient, CVA, chronic illness, recovering from surgery)    Negative: MODERATE difficulty breathing (e.g., speaks in phrases, SOB even at rest, pulse 100-120)    Answer Assessment - Initial Assessment Questions  1. COVID-19 DIAGNOSIS: \"Who made your Coronavirus (COVID-19) diagnosis?\" \"Was it confirmed by a positive lab test?\" If not diagnosed by a HCP, ask \"Are there lots of cases (community spread) where you live?\" (See public health department website, if " "unsure)      Vault test at home positive Tuesday 5/4/2021  2. COVID-19 EXPOSURE: \"Was there any known exposure to COVID before the symptoms began?\" CDC Definition of close contact: within 6 feet (2 meters) for a total of 15 minutes or more over a 24-hour period.       no  3. ONSET: \"When did the COVID-19 symptoms start?\"       5/2/21  4. WORST SYMPTOM: \"What is your worst symptom?\" (e.g., cough, fever, shortness of breath, muscle aches)      Today SOB, dizzy, coughing  5. COUGH: \"Do you have a cough?\" If so, ask: \"How bad is the cough?\"        yes  6. FEVER: \"Do you have a fever?\" If so, ask: \"What is your temperature, how was it measured, and when did it start?\"      No, 98.0 F  7. RESPIRATORY STATUS: \"Describe your breathing?\" (e.g., shortness of breath, wheezing, unable to speak)       SOB  8. BETTER-SAME-WORSE: \"Are you getting better, staying the same or getting worse compared to yesterday?\"  If getting worse, ask, \"In what way?\"      Worse today  9. HIGH RISK DISEASE: \"Do you have any chronic medical problems?\" (e.g., asthma, heart or lung disease, weak immune system, obesity, etc.)      no  10. PREGNANCY: \"Is there any chance you are pregnant?\" \"When was your last menstrual period?\"        no  11. OTHER SYMPTOMS: \"Do you have any other symptoms?\"  (e.g., chills, fatigue, headache, loss of smell or taste, muscle pain, sore throat; new loss of smell or taste especially support the diagnosis of COVID-19)        Yes \"all of it\"    Protocols used: CORONAVIRUS (COVID-19) DIAGNOSED OR [id number] 3.25    Munira Saleh RN ....................  5/7/2021   1:28 PM        "

## 2021-09-13 ENCOUNTER — TELEPHONE (OUTPATIENT)
Dept: FAMILY MEDICINE | Facility: OTHER | Age: 21
End: 2021-09-13

## 2021-09-13 DIAGNOSIS — L98.9 SKIN LESION: Primary | ICD-10-CM

## 2021-09-13 NOTE — TELEPHONE ENCOUNTER
I called the patient and left a voicemail for her to call back.    Morris Lawton LPN on 9/13/2021 at 8:44 AM

## 2021-09-13 NOTE — TELEPHONE ENCOUNTER
Referral to dermatology placed.    Does she want the Nexplanon replaced?  I can remove the Nexplanon however I cannot replace it.  If she would like it replaced I would recommend setting up an appointment with a provider that can remove and replace it at the same time.  Gemini Alvarado PA-C.......... 9/13/2021 12:27 PM

## 2021-09-13 NOTE — TELEPHONE ENCOUNTER
I called the patient and left a voicemail for her to call back.    Morris Lawton LPN on 9/13/2021 at 1:37 PM

## 2021-09-13 NOTE — TELEPHONE ENCOUNTER
Patient doesn't remember when her birth control expires.   Also needs a referral to dermatology.  Patient is finding out where she wants to go.  Most likely [facility].   Please call patient   Sobia Sood on 9/13/2021 at 8:01 AM

## 2021-09-13 NOTE — TELEPHONE ENCOUNTER
The patient called back and stated she would like a referral to [facility] in [place] for a few moles to be assessed/removed. The patient was also informed her birth control is suggested to be removed 11/02/21. The patient stated she is going to make an appointment with Gemini Alvarado NP for around that time.    Morris Lawton LPN on 9/13/2021 at 12:05 PM

## 2021-09-13 NOTE — TELEPHONE ENCOUNTER
Gemini Alvarado NP wanted to know if the patient was requesting to have Nexplanon replaced with a new one. Her next appointment says Nexplanon removal only. No need for a call back.    Morris Lawton LPN on 9/13/2021 at 4:00 PM

## 2021-09-13 NOTE — TELEPHONE ENCOUNTER
I called the patient and left a voicemail for the patient to call back. The patient's birth control is suggested to be removed 11/02/21.    Morris Lawton LPN on 9/13/2021 at 11:58 AM

## 2021-10-09 ENCOUNTER — HEALTH MAINTENANCE LETTER (OUTPATIENT)
Age: 21
End: 2021-10-09

## 2021-10-28 ENCOUNTER — VIRTUAL VISIT (OUTPATIENT)
Dept: FAMILY MEDICINE | Facility: OTHER | Age: 21
End: 2021-10-28
Attending: PHYSICIAN ASSISTANT
Payer: COMMERCIAL

## 2021-10-28 DIAGNOSIS — F41.9 SEVERE ANXIETY: Primary | ICD-10-CM

## 2021-10-28 PROCEDURE — 99212 OFFICE O/P EST SF 10 MIN: CPT | Mod: 95 | Performed by: PHYSICIAN ASSISTANT

## 2021-10-28 RX ORDER — CITALOPRAM HYDROBROMIDE 20 MG/1
TABLET ORAL
Qty: 30 TABLET | Refills: 2 | Status: SHIPPED | OUTPATIENT
Start: 2021-10-28 | End: 2022-09-22

## 2021-10-28 NOTE — PATIENT INSTRUCTIONS
Started on citalopram 20 mg.  Encouraged good diet and exercise.   Encouraged to see a counselor.   Return in 4-6 weeks for recheck.   Return to clinic with change/worsening of symptoms.       Suicide Emergency First call for help:  [phone]    Depression: Tips to Help Yourself  As your healthcare providers help treat your depression, you can also help yourself. Keep in mind that your illness affects you emotionally, physically, mentally, and socially. So full recovery will take time. Take care of your body and your soul, and be patient with yourself as you get better.    Self-care    Educate yourself. Read about treatment and medicine options. If you have the energy, attend local conferences or support groups. Keep a list of useful websites and helpful books and use them as needed. This illness is not your fault. Don t blame yourself for your depression.    Manage early symptoms. If you notice symptoms returning, experience triggers, or identify other factors that may lead to a depressive episode, get help as soon as possible. Ask trusted friends and family to monitor your behavior and let you know if they see anything of concern.    Work with your provider. Find a provider you can trust. Communicate honestly with that person and share information on your treatment for depression and your reaction to medicines.    Be prepared for a crisis. Know what to do if you experience a crisis. Keep the phone number of a crisis hotline and know the location of your community's urgent care centers and the closest emergency department.    Hold off on big decisions. Depression can cloud your judgment. So wait until you feel better before making major life decisions, such as changing jobs, moving, or getting  or .    Be patient. Recovering from depression is a process. Don t be discouraged if it takes some time to feel better.    Keep it simple. Depression saps your energy and concentration. So  you won t be able to do all the things you used to do. Set small goals and do what you can.    Be with others. Don t isolate yourself--you ll only feel worse. Try to be with other people. And take part in fun activities when you can. Go to a movie, ballgame, Nonden[employer] service, or social event. Talk openly with people you can trust. And accept help when it s offered.  Take care of your body  People with depression often lose the desire to take care of themselves. That only makes their problems worse. During treatment and afterward, make a point to:    Exercise. It s a great way to take care of your body. And studies have shown that exercise helps fight depression.    Avoid drugs and alcohol. These may ease the pain in the short term. But they ll only make your problems worse in the long run.    Get relief from stress. Ask your healthcare provider for relaxation exercises and techniques to help relieve stress.    Eat right. A balanced and healthy diet helps keep your body healthy.  Date Last Reviewed: 1/1/2017 2000-2017 [employer]. [address] [place] [address]. All rights reserved. This information is not intended as a substitute for professional medical care. Always follow your healthcare professional's instructions.         Treating Anxiety Disorders with Therapy    If you have an anxiety disorder, you don t have to suffer anymore. Treatment is available. Therapy (also called counseling) is often a helpful treatment for anxiety disorders. With therapy, a specially trained professional (therapist) helps you face and learn to manage your anxiety. Therapy can be short-term or long-term depending on your needs. In some cases, medicine may also be prescribed with therapy. It may take time before you notice how much therapy is helping, but stick with it. With therapy, you can feel better.  Cognitive behavioral therapy (CBT)  Cognitive behavioral therapy (CBT) teaches you to manage anxiety.  It does this by helping you understand how you think and act when you re anxious. Research has shown CBT to be a very effective treatment for anxiety disorders. How CBT is run is almost like a class. It involves homework and activities to build skills that teach you to cope with anxiety step by step. It can be done in a group or one-on-one, and often takes place for a set number of sessions. CBT has two main parts:    Cognitive therapy helps you identify the negative, irrational thoughts that occur with your anxiety. You ll learn to replace these with more positive, realistic thoughts.    Behavioral therapy helps you change how you react to anxiety. You ll learn coping skills and methods for relaxing to help you better deal with anxiety.  Other forms of therapy  Other therapy methods may work better for you than CBT. Or, you may move from CBT to another form of therapy as your treatment needs change. This may mean meeting with a therapist by yourself or in a group. Therapy can also help you work through problems in your life, such as drug or alcohol dependence, that may be making your anxiety worse.  Getting better takes time  Therapy will help you feel better and teach you skills to help manage anxiety long term. But change doesn t happen right away. It takes a commitment from you. And treatment only works if you learn to face the causes of your anxiety. So, you might feel worse before you feel better. This can sometimes make it hard to stick with it. But remember: Therapy is a very effective treatment. The results will be well worth it.  Helping yourself  If anxiety is wearing you down, here are some things you can do to cope:    Check with your doctor and rule out any physical problems that may be causing the anxiety symptoms.    If an anxiety disorder is diagnosed, seek mental healthcare. This is an illness and it can respond to treatment. Most types of anxiety disorders will respond to talk therapy and  medicine.    Educate yourself about anxiety disorders. Keep track of helpful online resources and books you can use during stressful periods.    Try stress management techniques such as meditation.    Consider online or in-person support groups.    Don t fight your feelings. Anxiety feeds itself. The more you worry about it, the worse it gets. Instead, try to identify what might have triggered your anxiety. Then try to put this threat in perspective.    Keep in mind that you can t control everything about a situation. Change what you can and let the rest take its course.    Exercise -- it s a great way to relieve tension and help your body feel relaxed.    Examine your life for stress, and try to find ways to reduce it.    Avoid caffeine and nicotine, which can make anxiety symptoms worse.    Fight the temptation to turn to alcohol or unprescribed drugs for relief. They only make things worse in the long run.   Date Last Reviewed: 2017-2017 [employer]. [address] [place] [address]. All rights reserved. This information is not intended as a substitute for professional medical care. Always follow your healthcare professional's instructions.          Counselors:     [facility]: [phone]    Rochelle French: [phone]    Ana Mcgraw: [phone]    Anand French CNP, [facility]: [phone]    Irma Zimmer: [phone]    [facility]: [phone]    Mack Azar: [phone]    [phone]    [facility]: [phone]    Anitra Pierre: [phone]    [facility]: [phone]    Adolescent Counseling:   [facility]: [phone]    [facility]: [phone]    [facility]: [phone]

## 2021-10-28 NOTE — PROGRESS NOTES
"Mya is a 21 year old who is being evaluated via a billable telephone visit.      What phone number would you like to be contacted at? [phone]  How would you like to obtain your AVS? [facility]    Assessment & Plan   Problem List Items Addressed This Visit     None      Visit Diagnoses     Severe anxiety    -  Primary    Relevant Medications    citalopram (CELEXA) 20 MG tablet         Patient was started on citalopram for treatment of severe anxiety.  Gave side effect profile.  Encourage good diet and exercise.  Encouraged to continue seeing her counselor.  Recheck in 4 to 6 weeks for monitoring.    Return in about 4 weeks (around 11/25/2021) for Recheck.    Gemini Alvarado PA-C  [facility]    Subjective   Mya is a 21 year old who presents for the following health issues    HPI     Patient is history of severe anxiety.  History of being on sertraline, fluoxetine, Lexapro.  Did not notice any changes with these medications.  Started therapy in August 2021.  This has been helping her a lot.  Unfortunately irritability and increased stress has still persisted.  Wondering about trying a different medication.  Citalopram has been recommended to her.  Interested in trying citalopram.  No suicidal or homicidal ideation.  Anxiety has been daily.    Review of Systems   Constitutional, HEENT, cardiovascular, pulmonary, gi and gu systems are negative, except as otherwise noted.      Objective    Vitals - Patient Reported  Weight (Patient Reported): 65.3 kg (144 lb)  Height (Patient Reported): 165.1 cm (5' 5\")  BMI (Based on Pt Reported Ht/Wt): 23.96  Temperature (Patient Reported): 98  F (36.7  C)  Pain Score: No Pain (0)      Vitals:  No vitals were obtained today due to virtual visit.    Physical Exam   healthy, alert and no distress  PSYCH: Alert and oriented times 3; coherent speech, normal   rate and volume, able to articulate logical thoughts, able   to abstract reason, no tangential thoughts, " no hallucinations   or delusions  Her affect is normal  RESP: No cough, no audible wheezing, able to talk in full sentences  Remainder of exam unable to be completed due to telephone visits        Phone call duration: 11 minutes

## 2021-11-12 ENCOUNTER — OFFICE VISIT (OUTPATIENT)
Dept: FAMILY MEDICINE | Facility: OTHER | Age: 21
End: 2021-11-12
Attending: PHYSICIAN ASSISTANT
Payer: COMMERCIAL

## 2021-11-12 VITALS
OXYGEN SATURATION: 99 % | DIASTOLIC BLOOD PRESSURE: 80 MMHG | SYSTOLIC BLOOD PRESSURE: 122 MMHG | WEIGHT: 151.8 LBS | BODY MASS INDEX: 25.26 KG/M2 | TEMPERATURE: 98.7 F | RESPIRATION RATE: 16 BRPM | HEART RATE: 71 BPM

## 2021-11-12 DIAGNOSIS — Z30.46 NEXPLANON REMOVAL: Primary | ICD-10-CM

## 2021-11-12 PROCEDURE — G0463 HOSPITAL OUTPT CLINIC VISIT: HCPCS

## 2021-11-12 PROCEDURE — 11982 REMOVE DRUG IMPLANT DEVICE: CPT | Performed by: PHYSICIAN ASSISTANT

## 2021-11-12 ASSESSMENT — PAIN SCALES - GENERAL: PAINLEVEL: NO PAIN (0)

## 2021-11-12 NOTE — NURSING NOTE
"Chief Complaint   Patient presents with     Procedure   Patient is here for Nexplanon removal. No other concerns or issues noted.     Initial /80   Pulse 71   Temp 98.7  F (37.1  C) (Tympanic)   Resp 16   Wt 68.9 kg (151 lb 12.8 oz)   LMP 10/14/2021 (Exact Date)   SpO2 99%   Breastfeeding No   BMI 25.26 kg/m   Estimated body mass index is 25.26 kg/m  as calculated from the following:    Height as of 10/28/21: 1.651 m (5' 5\").    Weight as of this encounter: 68.9 kg (151 lb 12.8 oz).  Medication Reconciliation: complete    FOOD SECURITY SCREENING QUESTIONS  Hunger Vital Signs:  Within the past 12 months we worried whether our food would run out before we got money to buy more. Never  Within the past 12 months the food we bought just didn't last and we didn't have money to get more. Never    Advanced Care Directive Reviewed    Morris Lawton LPN    "

## 2021-11-12 NOTE — PROGRESS NOTES
"Nursing Notes:   Morris Lawton LPN  11/12/2021  3:18 PM  Signed  Chief Complaint   Patient presents with     Procedure   Patient is here for Nexplanon removal. No other concerns or issues noted.     Initial /80   Pulse 71   Temp 98.7  F (37.1  C) (Tympanic)   Resp 16   Wt 68.9 kg (151 lb 12.8 oz)   LMP 10/14/2021 (Exact Date)   SpO2 99%   Breastfeeding No   BMI 25.26 kg/m   Estimated body mass index is 25.26 kg/m  as calculated from the following:    Height as of 10/28/21: 1.651 m (5' 5\").    Weight as of this encounter: 68.9 kg (151 lb 12.8 oz).  Medication Reconciliation: complete    FOOD SECURITY SCREENING QUESTIONS  Hunger Vital Signs:  Within the past 12 months we worried whether our food would run out before we got money to buy more. Never  Within the past 12 months the food we bought just didn't last and we didn't have money to get more. Never    Advanced Care Directive Reviewed    Morris Lawton LPN        HPI:    Mya Garcia is a 21 year old female who presents for IUD removal.  Patient is history of taking birth control pills and Depo-Provera injections.  Currently has Nexplanon that was placed in 11/2/2018.  Does not want another birth control at this time.  No pregnancy or STD concerns patient is going to try to use condoms.    Past Medical History:   Diagnosis Date     Anxiety disorder      Pneumonia     History of left lower lobe pneumonia times two.       Past Surgical History:   Procedure Laterality Date     NO HISTORY OF SURGERY         Family History   Problem Relation Age of Onset     Other - See Comments Mother         Psychiatric illness,depression and anxiety     Anxiety Disorder Father      Anxiety Disorder Maternal Grandmother      Cancer Maternal Grandfather        Social History     Tobacco Use     Smoking status: Never Smoker     Smokeless tobacco: Never Used   Substance Use Topics     Alcohol use: Yes     Comment: occasional       Current Outpatient " Medications   Medication Sig Dispense Refill     citalopram (CELEXA) 20 MG tablet Take 1/2 tab PO every day for 7 days, then increase to 1 tab PO QD 30 tablet 2     escitalopram (LEXAPRO) 10 MG tablet Take 1 tablet (10 mg) by mouth daily (Patient not taking: Reported on 10/28/2021) 30 tablet 1       No Known Allergies    REVIEW OF SYSTEMS:  Refer to HPI.    EXAM:   Vitals:    /80   Pulse 71   Temp 98.7  F (37.1  C) (Tympanic)   Resp 16   Wt 68.9 kg (151 lb 12.8 oz)   LMP 10/14/2021 (Exact Date)   SpO2 99%   Breastfeeding No   BMI 25.26 kg/m      General Appearance: Pleasant, alert, appropriate appearance for age. No acute distress  Chest/Respiratory Exam: Normal chest wall andrespirations. Clear to auscultation.  Cardiovascular Exam: Regular rate and rhythm. S1, S2, no murmur, click, gallop, or rubs.  Skin: Implanon appreciated beneath the skin on the left upper medial arm.  Psychiatric Exam: Alert and oriented - appropriate affect.    Procedural note:   Options are discussed and she has elected to have a Nexplanon removal completed.  Time out was called. Patient was prepped and draped in a proper sterile manner. Under sterile conditions, 2 cc 1% Lidocaine with epi was used to numb the surrounding area. A small incision was made with an #11 blade.  Using a small forceps the Nexplanon was removed.  Hemostasis was achieved with direct pressure. Sterile guaze was then applied with antibiotic ointment. Patient tolerated the procedure well. No complications were appreciated.    PHQ Depression Screen  PHQ-9 SCORE 10/30/2020 12/3/2020 10/28/2021   PHQ-9 Total Score 6 7 8       ASSESSMENT AND PLAN:      ICD-10-CM    1. Nexplanon removal  Z30.46 REMOVAL IMPLANT, SUPERFICIAL     Nexplanon removal was completed.  Patient tolerated the procedure well.  No complications were appreciated.  Return as needed for recheck.    Gemini Alvarado PA-C ..................11/12/2021 3:23 PM

## 2022-01-29 ENCOUNTER — HEALTH MAINTENANCE LETTER (OUTPATIENT)
Age: 22
End: 2022-01-29

## 2022-02-05 ENCOUNTER — OFFICE VISIT (OUTPATIENT)
Dept: FAMILY MEDICINE | Facility: OTHER | Age: 22
End: 2022-02-05
Attending: NURSE PRACTITIONER
Payer: COMMERCIAL

## 2022-02-05 VITALS
HEIGHT: 65 IN | WEIGHT: 151.6 LBS | OXYGEN SATURATION: 99 % | BODY MASS INDEX: 25.26 KG/M2 | RESPIRATION RATE: 16 BRPM | TEMPERATURE: 97.8 F

## 2022-02-05 DIAGNOSIS — B34.9 VIRAL ILLNESS: Primary | ICD-10-CM

## 2022-02-05 PROCEDURE — 99213 OFFICE O/P EST LOW 20 MIN: CPT | Performed by: NURSE PRACTITIONER

## 2022-02-05 PROCEDURE — G0463 HOSPITAL OUTPT CLINIC VISIT: HCPCS

## 2022-02-05 RX ORDER — ONDANSETRON 4 MG/1
4 TABLET, ORALLY DISINTEGRATING ORAL EVERY 8 HOURS PRN
Qty: 10 TABLET | Refills: 0 | Status: SHIPPED | OUTPATIENT
Start: 2022-02-05 | End: 2022-09-22

## 2022-02-05 ASSESSMENT — PAIN SCALES - GENERAL: PAINLEVEL: NO PAIN (0)

## 2022-02-05 ASSESSMENT — MIFFLIN-ST. JEOR: SCORE: 1448.53

## 2022-02-05 NOTE — NURSING NOTE
Patient presents to the clinic for runny nose x 3 days, nausea, vomiting and abdominal pain x 1 day. 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 She's had 3 negative at home covid tests this week. She reports previous dizziness and cold symptoms this past week. She did have a stomach bug a couple weeks ago.         FOOD SECURITY SCREENING QUESTIONS  Hunger Vital Signs:  Within the past 12 months we worried whether our food would run out before we got money to buy more. Never  Within the past 12 months the food we bought just didn't last and we didn't have money to get more. Never  Medication Reconciliation: complete  Brittney Gant, LIVAN 2/5/2022 2:54 PM

## 2022-02-05 NOTE — PATIENT INSTRUCTIONS
"  Patient Education     Viral Syndrome (Adult)  A viral illness may cause a number of symptoms such as fever. Other symptoms depend on the part of the body that the virus affects. If it settles in your nose, throat, and lungs, it may cause cough, sore throat, congestion, runny nose, headache, earache and other ear symptoms, or shortness of breath. If it settles in your stomach and intestinal tract, it may cause nausea, vomiting, cramping, and diarrhea. Sometimes it causes generalized symptoms like \"aching all over,\" feeling tired, loss of energy, or loss of appetite.  A viral illness usually lasts anywhere from several days to several weeks, but sometimes it lasts longer. In some cases, a more serious infection can look like a viral syndrome in the first few days of the illness. You may need another exam and additional tests to know the difference. Watch for the warning signs listed below for when to seek medical advice.  Home care  Follow these guidelines for taking care of yourself at home:    If symptoms are severe, rest at home for the first 2 to 3 days.    Stay away from cigarette smoke - both your smoke and the smoke from others.    You may use over-the-counter acetaminophen or ibuprofen for fever, muscle aching, and headache, unless another medicine was prescribed for this. If you have chronic liver or kidney disease or ever had a stomach ulcer or gastrointestinal bleeding, talk with your healthcare provider before using these medicines. No one who is younger than 18 and ill with a fever should take aspirin. It may cause severe disease or death.    Your appetite may be poor, so a light diet is fine. Avoid dehydration by drinking 8 to 12, 8-ounce glasses of fluids each day. This may include water; orange juice; lemonade; apple, grape, and cranberry juice; clear fruit drinks; electrolyte replacement and sports drinks; and decaffeinated teas and coffee. If you have been diagnosed with a kidney disease, ask your " healthcare provider how much and what types of fluids you should drink to prevent dehydration. If you have kidney disease, drinking too much fluid can cause it build up in the your body and be dangerous to your health.    Over-the-counter remedies won't shorten the length of the illness but may be helpful for symptoms such as cough, sore throat, nasal and sinus congestion, or diarrhea. Don't use decongestants if you have high blood pressure.  Follow-up care  Follow up with your healthcare provider if you do not improve over the next week.  Call 911  Call 911 if any of the following occur:    Convulsion    Feeling weak, dizzy, or like you are going to faint    Chest pain, or more than mild shortness of breath  When to seek medical advice  Call your healthcare provider right away if any of these occur:    Cough with lots of colored sputum (mucus) or blood in your sputum    Chest pain, shortness of breath, wheezing, or trouble breathing    Severe headache; face, neck, or ear pain    Severe, constant pain in the lower right side of your belly (abdominal)    Continued vomiting (can t keep liquids down)    Frequent diarrhea (more than 5 times a day); blood (red or black color) or mucus in diarrhea    Feeling weak, dizzy, or like you are going to faint    Extreme thirst    Fever of 100.4 F (38 C) or higher, or as directed by your healthcare provider  Sindy last reviewed this educational content on 4/1/2018 2000-2021 [employer]. All rights reserved. This information is not intended as a substitute for professional medical care. Always follow your healthcare professional's instructions.

## 2022-02-05 NOTE — PROGRESS NOTES
ASSESSMENT/PLAN:    I have reviewed the nursing notes.  I have reviewed the findings, diagnosis, plan and need for follow up with the patient.    1. Viral illness  -symptoms consistent with viral illness  -treat symptoms-maintain hydration and nutrition, Ibuprofen/tylenol as needed for comfort and maintaining temp.    -follow with primary provider if sx worsen or persist.    - ondansetron (ZOFRAN-ODT) 4 MG ODT tab; Take 1 tablet (4 mg) by mouth every 8 hours as needed for nausea  Dispense: 10 tablet; Refill: 0    Explanation of diagnostic considerations and recommendations given to the patient, who voiced understanding and agreement with the treatment plan. All questions were answered.     HPI:    Mya Garcia is a 21 year old female  who presents to [facility] today for runny nose x 5 days-nasal congestion, no sinus pressure, bad headaches located frontal/temporal, no cough, sore throat, no ear pain but ears feel draining.  No chest discomfort, no difficulty breathing.    Episodes x 3 this morning of abdominal pain followed by large emesis.  Able to take fluids-ginger ale, water.  No pain since vomiting.  Has nausea today.  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 These kids did not have GI symptoms  Negative home covid test this week x 3.   Lightheadedness intermittently with the cold symptoms this past week   Diarrhea, vomiting, fever, chills 3 weeks ago.  Talking ibuprofen, more water-very thirsty this week, peppermint oil,  Did not have influenza or covid vaccines.     Past Medical History:   Diagnosis Date     Anxiety disorder      Pneumonia     History of left lower lobe pneumonia times two.     Past Surgical History:   Procedure Laterality Date     NO HISTORY OF SURGERY       Social History     Tobacco Use     Smoking status: Never Smoker     Smokeless tobacco: Never Used   Substance Use Topics     Alcohol use: Yes     Comment: occasional     Current Outpatient  "Medications   Medication Sig Dispense Refill     citalopram (CELEXA) 20 MG tablet Take 1/2 tab PO every day for 7 days, then increase to 1 tab PO QD 30 tablet 2     No Known Allergies      Past medical history, past surgical history, current medications and allergies reviewed and accurate to the best of my knowledge.        ROS:  Refer to HPI    Temp 97.8  F (36.6  C) (Tympanic)   Resp 16   Ht 1.651 m (5' 5\")   Wt 68.8 kg (151 lb 9.6 oz)   SpO2 99%   Breastfeeding No   BMI 25.23 kg/m      EXAM:  General Appearance: Well appearing, appropriate appearance for age. No acute distress  Ears: TMs bilaterally intact, translucent with bony landmarks appreciated, no erythema, no effusion, no bulging, no purulence. External auditory canals clear. .  Normal external ears, non tender.  Eyes: conjunctivae normal without erythema or irritation, corneas clear, no drainage or crusting, no eyelid swelling, pupils equal   Orophayrnx: moist mucous membranes, posterior pharynx without erythema, tonsils without hypertrophy, no erythema, no exudates or petechiae, no post nasal drip seen, no trismus, voice clear.    Sinuses:  No sinus tenderness upon palpation of the frontal or maxillary sinuses  Nose:  Bilateral nares: no erythema, no edema, no drainage or congestion   Neck: supple without adenopathy  Respiratory:  Normal effort.  Clear to auscultation bilaterally, no wheezing, crackles or rhonchi.  No increased work of breathing.  No cough appreciated.  Cardiac: RRR without murmurs  Musculoskeletal:  Equal movement of bilateral upper extremities.  Equal movement of bilateral lower extremities.  Normal gait.    Dermatological: no rashes noted of exposed skin  Psychological: normal affect, alert, oriented, and pleasant.         "

## 2022-08-07 ENCOUNTER — HOSPITAL ENCOUNTER (EMERGENCY)
Facility: OTHER | Age: 22
Discharge: HOME | End: 2022-08-07
Attending: PHYSICIAN ASSISTANT | Admitting: PHYSICIAN ASSISTANT
Payer: COMMERCIAL

## 2022-08-07 ENCOUNTER — APPOINTMENT (OUTPATIENT)
Dept: GENERAL RADIOLOGY | Facility: OTHER | Age: 22
End: 2022-08-07
Attending: PHYSICIAN ASSISTANT
Payer: COMMERCIAL

## 2022-08-07 VITALS
BODY MASS INDEX: 24.75 KG/M2 | TEMPERATURE: 98.4 F | WEIGHT: 145 LBS | OXYGEN SATURATION: 99 % | DIASTOLIC BLOOD PRESSURE: 88 MMHG | HEIGHT: 64 IN | SYSTOLIC BLOOD PRESSURE: 129 MMHG | HEART RATE: 85 BPM | RESPIRATION RATE: 18 BRPM

## 2022-08-07 DIAGNOSIS — K59.01 SLOW TRANSIT CONSTIPATION: ICD-10-CM

## 2022-08-07 DIAGNOSIS — R10.84 ABDOMINAL PAIN, GENERALIZED: ICD-10-CM

## 2022-08-07 LAB
ALBUMIN SERPL-MCNC: 4.4 G/DL (ref 3.5–5.7)
ALBUMIN UR-MCNC: NEGATIVE MG/DL
ALP SERPL-CCNC: 62 U/L (ref 34–104)
ALT SERPL W P-5'-P-CCNC: 17 U/L (ref 7–52)
ANION GAP SERPL CALCULATED.3IONS-SCNC: 7 MMOL/L (ref 3–14)
APPEARANCE UR: CLEAR
AST SERPL W P-5'-P-CCNC: 18 U/L (ref 13–39)
BACTERIA #/AREA URNS HPF: ABNORMAL /HPF
BASOPHILS # BLD AUTO: 0.1 10E3/UL (ref 0–0.2)
BASOPHILS NFR BLD AUTO: 1 %
BILIRUB SERPL-MCNC: 0.4 MG/DL (ref 0.3–1)
BILIRUB UR QL STRIP: NEGATIVE
BUN SERPL-MCNC: 12 MG/DL (ref 7–25)
CALCIUM SERPL-MCNC: 9.4 MG/DL (ref 8.6–10.3)
CHLORIDE BLD-SCNC: 104 MMOL/L (ref 98–107)
CO2 SERPL-SCNC: 27 MMOL/L (ref 21–31)
COLOR UR AUTO: YELLOW
CREAT SERPL-MCNC: 0.84 MG/DL (ref 0.6–1.2)
CRP SERPL HS-MCNC: 3.5 MG/L
EOSINOPHIL # BLD AUTO: 0.2 10E3/UL (ref 0–0.7)
EOSINOPHIL NFR BLD AUTO: 4 %
ERYTHROCYTE [DISTWIDTH] IN BLOOD BY AUTOMATED COUNT: 13.2 % (ref 10–15)
GFR SERPL CREATININE-BSD FRML MDRD: >90 ML/MIN/1.73M2
GLUCOSE BLD-MCNC: 86 MG/DL (ref 70–105)
GLUCOSE UR STRIP-MCNC: NEGATIVE MG/DL
HCG UR QL: NEGATIVE
HCT VFR BLD AUTO: 41.5 % (ref 35–47)
HGB BLD-MCNC: 14.1 G/DL (ref 11.7–15.7)
HGB UR QL STRIP: NEGATIVE
IMM GRANULOCYTES # BLD: 0 10E3/UL
IMM GRANULOCYTES NFR BLD: 0 %
KETONES UR STRIP-MCNC: NEGATIVE MG/DL
LEUKOCYTE ESTERASE UR QL STRIP: NEGATIVE
LYMPHOCYTES # BLD AUTO: 1.7 10E3/UL (ref 0.8–5.3)
LYMPHOCYTES NFR BLD AUTO: 30 %
MAGNESIUM SERPL-MCNC: 2 MG/DL (ref 1.9–2.7)
MCH RBC QN AUTO: 28.3 PG (ref 26.5–33)
MCHC RBC AUTO-ENTMCNC: 34 G/DL (ref 31.5–36.5)
MCV RBC AUTO: 83 FL (ref 78–100)
MONOCYTES # BLD AUTO: 0.7 10E3/UL (ref 0–1.3)
MONOCYTES NFR BLD AUTO: 12 %
MUCOUS THREADS #/AREA URNS LPF: PRESENT /LPF
NEUTROPHILS # BLD AUTO: 2.9 10E3/UL (ref 1.6–8.3)
NEUTROPHILS NFR BLD AUTO: 53 %
NITRATE UR QL: NEGATIVE
NRBC # BLD AUTO: 0 10E3/UL
NRBC BLD AUTO-RTO: 0 /100
PH UR STRIP: 7 [PH] (ref 5–9)
PLATELET # BLD AUTO: 196 10E3/UL (ref 150–450)
POTASSIUM BLD-SCNC: 3.8 MMOL/L (ref 3.5–5.1)
PROT SERPL-MCNC: 7.4 G/DL (ref 6.4–8.9)
RBC # BLD AUTO: 4.98 10E6/UL (ref 3.8–5.2)
RBC URINE: <1 /HPF
SODIUM SERPL-SCNC: 138 MMOL/L (ref 134–144)
SP GR UR STRIP: 1.01 (ref 1–1.03)
UROBILINOGEN UR STRIP-MCNC: NORMAL MG/DL
WBC # BLD AUTO: 5.5 10E3/UL (ref 4–11)
WBC URINE: 0 /HPF

## 2022-08-07 PROCEDURE — 81025 URINE PREGNANCY TEST: CPT | Performed by: PHYSICIAN ASSISTANT

## 2022-08-07 PROCEDURE — 81001 URINALYSIS AUTO W/SCOPE: CPT | Performed by: PHYSICIAN ASSISTANT

## 2022-08-07 PROCEDURE — 85025 COMPLETE CBC W/AUTO DIFF WBC: CPT | Performed by: PHYSICIAN ASSISTANT

## 2022-08-07 PROCEDURE — 99284 EMERGENCY DEPT VISIT MOD MDM: CPT | Mod: 25 | Performed by: PHYSICIAN ASSISTANT

## 2022-08-07 PROCEDURE — 99284 EMERGENCY DEPT VISIT MOD MDM: CPT | Performed by: PHYSICIAN ASSISTANT

## 2022-08-07 PROCEDURE — 74019 RADEX ABDOMEN 2 VIEWS: CPT | Mod: TC

## 2022-08-07 PROCEDURE — 83735 ASSAY OF MAGNESIUM: CPT | Performed by: PHYSICIAN ASSISTANT

## 2022-08-07 PROCEDURE — 86141 C-REACTIVE PROTEIN HS: CPT | Performed by: PHYSICIAN ASSISTANT

## 2022-08-07 PROCEDURE — 36415 COLL VENOUS BLD VENIPUNCTURE: CPT | Performed by: PHYSICIAN ASSISTANT

## 2022-08-07 PROCEDURE — 80053 COMPREHEN METABOLIC PANEL: CPT | Performed by: PHYSICIAN ASSISTANT

## 2022-08-07 RX ORDER — DOCUSATE SODIUM 100 MG/1
100 CAPSULE, LIQUID FILLED ORAL 2 TIMES DAILY
Qty: 10 CAPSULE | Refills: 0 | Status: SHIPPED | OUTPATIENT
Start: 2022-08-07 | End: 2022-08-12

## 2022-08-07 RX ORDER — POLYETHYLENE GLYCOL 3350 17 G/17G
1 POWDER, FOR SOLUTION ORAL DAILY
Qty: 85 G | Refills: 0 | Status: SHIPPED | OUTPATIENT
Start: 2022-08-07 | End: 2022-08-12

## 2022-08-07 ASSESSMENT — ENCOUNTER SYMPTOMS
FLANK PAIN: 0
HEADACHES: 0
BLOOD IN STOOL: 0
CONSTIPATION: 0
APPETITE CHANGE: 0
SORE THROAT: 0
FEVER: 0
LIGHT-HEADEDNESS: 0
ABDOMINAL PAIN: 0
NAUSEA: 0
STRIDOR: 0
FACIAL SWELLING: 0
TREMORS: 0
DIARRHEA: 0
SEIZURES: 0
ACTIVITY CHANGE: 0
NECK PAIN: 0
FATIGUE: 0
BACK PAIN: 0
EYE PAIN: 0
WHEEZING: 0
ABDOMINAL DISTENTION: 0
DIZZINESS: 0
AGITATION: 0
VOMITING: 0

## 2022-08-07 NOTE — ED PROVIDER NOTES
"  History     Chief Complaint   Patient presents with     Abdominal Pain     Melena     HPI  Mya Garcia is a 22 year old female who reports that she has been having abdominal pain since Monday ( 6 days ago).  Denies any new restaurants or foods.  She reports her stools have been somewhat \"tarry\".  Denies any blood in her toilet.  She has been having some generalized abdominal cramping.  Has not had any significant change in her diet either.  Yesterday her stool had some \"mucus in it\".  Denies any lightheadedness or dizziness.  No nausea or vomiting.  No sore throat, cough, or flulike symptoms.  She has not had her COVID vaccination series.  She reports an episode of dark in the stool however upon further investigation this was after she had taken some Pepto-Bismol.    Allergies:  No Known Allergies    Problem List:    Patient Active Problem List    Diagnosis Date Noted     Anxiety state 01/14/2011     Priority: Medium        Past Medical History:    Past Medical History:   Diagnosis Date     Anxiety disorder      Pneumonia        Past Surgical History:    Past Surgical History:   Procedure Laterality Date     NO HISTORY OF SURGERY         Family History:    Family History   Problem Relation Age of Onset     Other - See Comments Mother         Psychiatric illness,depression and anxiety     Anxiety Disorder Father      Anxiety Disorder Maternal Grandmother      Cancer Maternal Grandfather        Social History:  Marital Status:  Single [1]  Social History     Tobacco Use     Smoking status: Never Smoker     Smokeless tobacco: Never Used   Vaping Use     Vaping Use: Never used   Substance Use Topics     Alcohol use: Yes     Comment: occasional     Drug use: No        Medications:    citalopram (CELEXA) 20 MG tablet  ondansetron (ZOFRAN-ODT) 4 MG ODT tab          Review of Systems   Constitutional: Negative for activity change, appetite change, fatigue and fever.   HENT: Negative for facial swelling and sore " "throat.    Eyes: Negative for pain and visual disturbance.   Respiratory: Negative for wheezing and stridor.    Cardiovascular: Negative for chest pain.   Gastrointestinal: Negative for abdominal distention, abdominal pain, blood in stool, constipation, diarrhea, nausea and vomiting.   Genitourinary: Negative for flank pain.   Musculoskeletal: Negative for back pain and neck pain.   Skin: Negative for pallor.   Neurological: Negative for dizziness, tremors, seizures, light-headedness and headaches.   Psychiatric/Behavioral: Negative for agitation.   All other systems reviewed and are negative.      Physical Exam   BP: 129/88  Pulse: 85  Temp: 98.4  F (36.9  C)  Resp: 18  Height: 162.6 cm (5' 4\")  Weight: 65.8 kg (145 lb)  SpO2: 99 %      Physical Exam  Vitals and nursing note reviewed.   Constitutional:       General: She is not in acute distress.     Appearance: Normal appearance. She is not ill-appearing or toxic-appearing.   HENT:      Head: Normocephalic. No raccoon eyes, right periorbital erythema or left periorbital erythema.      Right Ear: No drainage or tenderness.      Left Ear: No drainage or tenderness.      Nose: Nose normal.   Eyes:      General: Lids are normal. Gaze aligned appropriately. No scleral icterus.     Extraocular Movements: Extraocular movements intact.   Neck:      Trachea: No tracheal deviation.   Cardiovascular:      Rate and Rhythm: Normal rate.   Pulmonary:      Effort: Pulmonary effort is normal. No respiratory distress.      Breath sounds: No stridor. No wheezing.      Comments: SaO2 is 99% on room air.  She is not appear to be in any respiratory distress.  No tachypnea.  Abdominal:      General: Abdomen is flat. Bowel sounds are normal. There is no distension.      Palpations: Abdomen is soft. There is no mass.      Tenderness: There is no abdominal tenderness. There is no right CVA tenderness, left CVA tenderness, guarding or rebound.      Comments: Abdomen is soft nontender no " rebound or masses bowel sounds are normal   Musculoskeletal:         General: No deformity or signs of injury. Normal range of motion.      Cervical back: Normal range of motion. No signs of trauma.   Skin:     General: Skin is warm and dry.      Coloration: Skin is not jaundiced or pale.   Neurological:      General: No focal deficit present.      Mental Status: She is alert and oriented to person, place, and time.      GCS: GCS eye subscore is 4. GCS verbal subscore is 5. GCS motor subscore is 6.      Motor: No tremor or seizure activity.   Psychiatric:         Attention and Perception: Attention normal.         Mood and Affect: Mood normal.         ED Course       Results for orders placed or performed during the hospital encounter of 08/07/22 (from the past 24 hour(s))   UA with Microscopic reflex to Culture    Specimen: Urine, Clean Catch   Result Value Ref Range    Color Urine Yellow Colorless, Straw, Light Yellow, Yellow    Appearance Urine Clear Clear    Glucose Urine Negative Negative mg/dL    Bilirubin Urine Negative Negative    Ketones Urine Negative Negative mg/dL    Specific Gravity Urine 1.010 1.005 - 1.030    Blood Urine Negative Negative    pH Urine 7.0 5.0 - 9.0    Protein Albumin Urine Negative Negative mg/dL    Urobilinogen Urine Normal Normal, 2.0 mg/dL    Nitrite Urine Negative Negative    Leukocyte Esterase Urine Negative Negative    Bacteria Urine Few (A) None Seen /HPF    Mucus Urine Present (A) None Seen /LPF    RBC Urine <1 <=2 /HPF    WBC Urine 0 <=5 /HPF    Narrative    Urine Culture not indicated   HCG qualitative urine   Result Value Ref Range    hCG Urine Qualitative Negative Negative   CBC with platelets differential    Narrative    The following orders were created for panel order CBC with platelets differential.  Procedure                               Abnormality         Status                     ---------                               -----------         ------                      CBC with platelets and d...[[id number]]                      Final result                 Please view results for these tests on the individual orders.   Comprehensive metabolic panel   Result Value Ref Range    Sodium 138 134 - 144 mmol/L    Potassium 3.8 3.5 - 5.1 mmol/L    Chloride 104 98 - 107 mmol/L    Carbon Dioxide (CO2) 27 21 - 31 mmol/L    Anion Gap 7 3 - 14 mmol/L    Urea Nitrogen 12 7 - 25 mg/dL    Creatinine 0.84 0.60 - 1.20 mg/dL    Calcium 9.4 8.6 - 10.3 mg/dL    Glucose 86 70 - 105 mg/dL    Alkaline Phosphatase 62 34 - 104 U/L    AST 18 13 - 39 U/L    ALT 17 7 - 52 U/L    Protein Total 7.4 6.4 - 8.9 g/dL    Albumin 4.4 3.5 - 5.7 g/dL    Bilirubin Total 0.4 0.3 - 1.0 mg/dL    GFR Estimate >90 >60 mL/min/1.73m2   Magnesium   Result Value Ref Range    Magnesium 2.0 1.9 - 2.7 mg/dL   C-Reactive Protein high sensitivity GH   Result Value Ref Range    C-Reactive Protein High Sensitivity 3.5 mg/L   CBC with platelets and differential   Result Value Ref Range    WBC Count 5.5 4.0 - 11.0 10e3/uL    RBC Count 4.98 3.80 - 5.20 10e6/uL    Hemoglobin 14.1 11.7 - 15.7 g/dL    Hematocrit 41.5 35.0 - 47.0 %    MCV 83 78 - 100 fL    MCH 28.3 26.5 - 33.0 pg    MCHC 34.0 31.5 - 36.5 g/dL    RDW 13.2 10.0 - 15.0 %    Platelet Count 196 150 - 450 10e3/uL    % Neutrophils 53 %    % Lymphocytes 30 %    % Monocytes 12 %    % Eosinophils 4 %    % Basophils 1 %    % Immature Granulocytes 0 %    NRBCs per 100 WBC 0 <1 /100    Absolute Neutrophils 2.9 1.6 - 8.3 10e3/uL    Absolute Lymphocytes 1.7 0.8 - 5.3 10e3/uL    Absolute Monocytes 0.7 0.0 - 1.3 10e3/uL    Absolute Eosinophils 0.2 0.0 - 0.7 10e3/uL    Absolute Basophils 0.1 0.0 - 0.2 10e3/uL    Absolute Immature Granulocytes 0.0 <=0.4 10e3/uL    Absolute NRBCs 0.0 10e3/uL   XR Abdomen 2 Views    Narrative    PROCEDURE INFORMATION:   Exam: XR Abdomen   Exam date and time: 8/7/2022 12:20 PM   Age: 22 years old   Clinical indication: Other: Pain     TECHNIQUE:   Imaging  protocol: Radiologic exam of the abdomen.   Views: 2 Views. Upright and supine views.     COMPARISON:   No relevant prior studies available.     FINDINGS:   Gastrointestinal tract: Constipation throughout the colon..   Intraperitoneal space: Normal. No free air.   Bones/joints: Mild scoliosis   Soft tissues: Metallic foreign bodies overlie the right sacroiliac joint and   may be ingested foreign bodies. Clinical correlation is recommended.       Impression    IMPRESSION:   1. Constipation throughout the colon..   2. Metallic foreign bodies overlie the right sacroiliac joint and may be   ingested foreign bodies. Clinical correlation is recommended.     THIS DOCUMENT HAS BEEN ELECTRONICALLY SIGNED BY TEREZA SALAZAR MD       Medications - No data to display    Assessments & Plan (with Medical Decision Making)     I have reviewed the nursing notes.    I have reviewed the findings, diagnosis, plan and need for follow up with the patient.      Discharge Medication List as of 8/7/2022 12:58 PM      START taking these medications    Details   docusate sodium (COLACE) 100 MG capsule Take 1 capsule (100 mg) by mouth 2 times daily for 5 days, Disp-10 capsule, R-0, Local Print      polyethylene glycol (MIRALAX) 17 GM/Dose powder Take 17 g (1 capful) by mouth daily for 5 days, Disp-85 g, R-0, Local Print             Final diagnoses:   Abdominal pain, generalized   Slow transit constipation     Afebrile.  Vital signs stable.  Patient with some generalized abdominal pain and some loose stools off and on over the past 6 days.  No nausea or vomiting.  1 episode darkened stool however upon further investigation this was after taking some Pepto-Bismol.  No prior abdominal surgeries.  Differential diagnosis for female abdominal pain includes: appendicitis, aortic aneurysm, mesenteric ischemia, bowel perforation, bowel obstruction, inflammatory bowel diseases, cholecystitis, pancreatitis, hepatitis, gastritis, GERD, diverticulitis,  PUD, pyelonephritis/UTI, renal colic/stone, GC/chlamydia, PID, cervicitis, endometritis, IUP, ectopic pregnancy, dysfunctional uterine bleeding, ovarian cyst/torsion, spontaneous , AAA, as well as other etiologies.  CBC shows normal white blood cells no left shift.  Her hemoglobin is 14.1.  CMP is unremarkable.  Her UA shows no signs UTI or hematuria which is reassuring.  hCG is negative.  C-reactive protein is normal and magnesium is normal.  Abdominal x-rays show Constipation throughout the colon.. Metallic foreign bodies overlie the right sacroiliac joint and may be ingested foreign bodies.  (However I was contacted by the radiology tech who noted these metallic pieces in the patient's pants itself.  She had put a notation for the radiologist but it seems he may have missed this.  We do not suspect any swallowed foreign bodies at this point.)  I reviewed these findings with the patient.  While she may have some form of viral gastroenteritis as she suggests it.  She has constipation and this is the source of her pain.  I discussed increasing fruit and fiber and fluids in her diet.  Rx for 5-day course of Colace and MiraLAX.  Continue to monitor symptoms return if there is any concerns for further evaluation as needed.  I explained my diagnostic considerations and recommendations and the patient voiced an understanding and was in agreement with the treatment plan. All questions were answered to the best of my ability.  We discussed potential side effects of any prescribed or recommended therapies, as well as expectations for response to treatments.     NOTE: [facility] is closed today.  2022   [facility]         Naun Gotti PA-C  [phone]

## 2022-08-12 ENCOUNTER — MYC MEDICAL ADVICE (OUTPATIENT)
Dept: FAMILY MEDICINE | Facility: OTHER | Age: 22
End: 2022-08-12

## 2022-08-12 NOTE — TELEPHONE ENCOUNTER
Please call - If she was very constipated, it may take time to get emptied and may have some diarrhea while in the process of doing so.  If the diarrhea is severe and she is getting dehydrated or having significant abdominal pain or other concerns, she should probably be seen again to make sure there is not something else more concerning going on.  Ruby Lopes MD

## 2022-08-24 ENCOUNTER — OFFICE VISIT (OUTPATIENT)
Dept: FAMILY MEDICINE | Facility: OTHER | Age: 22
End: 2022-08-24
Attending: PHYSICIAN ASSISTANT
Payer: COMMERCIAL

## 2022-08-24 VITALS
WEIGHT: 152.4 LBS | SYSTOLIC BLOOD PRESSURE: 120 MMHG | BODY MASS INDEX: 26.16 KG/M2 | OXYGEN SATURATION: 99 % | RESPIRATION RATE: 16 BRPM | HEART RATE: 65 BPM | DIASTOLIC BLOOD PRESSURE: 80 MMHG | TEMPERATURE: 98.4 F

## 2022-08-24 DIAGNOSIS — Z31.69 PRE-CONCEPTION COUNSELING: Primary | ICD-10-CM

## 2022-08-24 DIAGNOSIS — R53.83 FATIGUE, UNSPECIFIED TYPE: ICD-10-CM

## 2022-08-24 DIAGNOSIS — N91.2 AMENORRHEA: ICD-10-CM

## 2022-08-24 DIAGNOSIS — Z13.29 SCREENING FOR THYROID DISORDER: ICD-10-CM

## 2022-08-24 LAB
ANION GAP SERPL CALCULATED.3IONS-SCNC: 6 MMOL/L (ref 3–14)
B-HCG SERPL-ACNC: <1 IU/L
BASOPHILS # BLD AUTO: 0.1 10E3/UL (ref 0–0.2)
BASOPHILS NFR BLD AUTO: 1 %
BUN SERPL-MCNC: 15 MG/DL (ref 7–25)
CALCIUM SERPL-MCNC: 9.4 MG/DL (ref 8.6–10.3)
CHLORIDE BLD-SCNC: 104 MMOL/L (ref 98–107)
CO2 SERPL-SCNC: 29 MMOL/L (ref 21–31)
CREAT SERPL-MCNC: 0.77 MG/DL (ref 0.6–1.2)
EOSINOPHIL # BLD AUTO: 0.1 10E3/UL (ref 0–0.7)
EOSINOPHIL NFR BLD AUTO: 2 %
ERYTHROCYTE [DISTWIDTH] IN BLOOD BY AUTOMATED COUNT: 12.9 % (ref 10–15)
FERRITIN SERPL-MCNC: 54 NG/ML (ref 24–336)
GFR SERPL CREATININE-BSD FRML MDRD: >90 ML/MIN/1.73M2
GLUCOSE BLD-MCNC: 88 MG/DL (ref 70–105)
HCT VFR BLD AUTO: 39.5 % (ref 35–47)
HGB BLD-MCNC: 13.4 G/DL (ref 11.7–15.7)
IMM GRANULOCYTES # BLD: 0 10E3/UL
IMM GRANULOCYTES NFR BLD: 0 %
IRON SATN MFR SERPL: 15 % (ref 20–55)
IRON SERPL-MCNC: 43 UG/DL (ref 50–212)
LYMPHOCYTES # BLD AUTO: 2.6 10E3/UL (ref 0.8–5.3)
LYMPHOCYTES NFR BLD AUTO: 40 %
MCH RBC QN AUTO: 28.4 PG (ref 26.5–33)
MCHC RBC AUTO-ENTMCNC: 33.9 G/DL (ref 31.5–36.5)
MCV RBC AUTO: 84 FL (ref 78–100)
MONOCYTES # BLD AUTO: 0.5 10E3/UL (ref 0–1.3)
MONOCYTES NFR BLD AUTO: 8 %
NEUTROPHILS # BLD AUTO: 3.2 10E3/UL (ref 1.6–8.3)
NEUTROPHILS NFR BLD AUTO: 49 %
NRBC # BLD AUTO: 0 10E3/UL
NRBC BLD AUTO-RTO: 0 /100
PLATELET # BLD AUTO: 247 10E3/UL (ref 150–450)
POTASSIUM BLD-SCNC: 3.6 MMOL/L (ref 3.5–5.1)
RBC # BLD AUTO: 4.72 10E6/UL (ref 3.8–5.2)
SODIUM SERPL-SCNC: 139 MMOL/L (ref 134–144)
TIBC SERPL-MCNC: 292.6 UG/DL (ref 245–400)
TRANSFERRIN SERPL-MCNC: 209 MG/DL (ref 203–362)
TSH SERPL DL<=0.005 MIU/L-ACNC: 0.85 MU/L (ref 0.4–4)
UIBC (UNSATURATED): 249.6 MG/DL
WBC # BLD AUTO: 6.5 10E3/UL (ref 4–11)

## 2022-08-24 PROCEDURE — 36415 COLL VENOUS BLD VENIPUNCTURE: CPT | Mod: ZL | Performed by: PHYSICIAN ASSISTANT

## 2022-08-24 PROCEDURE — 83550 IRON BINDING TEST: CPT | Mod: ZL | Performed by: PHYSICIAN ASSISTANT

## 2022-08-24 PROCEDURE — 85025 COMPLETE CBC W/AUTO DIFF WBC: CPT | Mod: ZL | Performed by: PHYSICIAN ASSISTANT

## 2022-08-24 PROCEDURE — 82728 ASSAY OF FERRITIN: CPT | Mod: ZL | Performed by: PHYSICIAN ASSISTANT

## 2022-08-24 PROCEDURE — 84443 ASSAY THYROID STIM HORMONE: CPT | Mod: ZL | Performed by: PHYSICIAN ASSISTANT

## 2022-08-24 PROCEDURE — 99213 OFFICE O/P EST LOW 20 MIN: CPT | Performed by: PHYSICIAN ASSISTANT

## 2022-08-24 PROCEDURE — 82310 ASSAY OF CALCIUM: CPT | Mod: ZL | Performed by: PHYSICIAN ASSISTANT

## 2022-08-24 PROCEDURE — G0463 HOSPITAL OUTPT CLINIC VISIT: HCPCS

## 2022-08-24 PROCEDURE — 84702 CHORIONIC GONADOTROPIN TEST: CPT | Mod: ZL | Performed by: PHYSICIAN ASSISTANT

## 2022-08-24 ASSESSMENT — PAIN SCALES - GENERAL: PAINLEVEL: NO PAIN (0)

## 2022-08-24 NOTE — PROGRESS NOTES
Assessment & Plan   Problem List Items Addressed This Visit    None     Visit Diagnoses     Pre-conception counseling    -  Primary    Relevant Orders    Basic Metabolic Panel    CBC and Differential    TSH Reflex GH    Ferritin    Iron binding panel    Fatigue, unspecified type        Relevant Orders    Basic Metabolic Panel    CBC and Differential    TSH Reflex GH    Ferritin    Iron binding panel    Screening for thyroid disorder        Relevant Orders    Basic Metabolic Panel    CBC and Differential    TSH Reflex GH    Ferritin    Iron binding panel    Amenorrhea        Relevant Orders    HCG quantitative pregnancy         -Take prenatal multivitamin daily.    -Encouraged increase of fluids and a balanced diet.    -Encouraged routine exercise.    -Abstain from alcohol, smoking and illicit drug use.   -Avoid taking medications like ibuprofen, advil, aspirin and aleve. You may take tylenol for pain.   -Limit caffeine consumption to less than 200 to 300 mg per day .    Completed labs rule out concerns with recent history of fatigue, amenorrhea, and thyroid disorder screening.  Encourage good diet and exercise.       See Patient Instructions    Return if symptoms worsen or fail to improve.    Gemini Alvarado PA-C  [facility]    Lita Roque is a 22 year old, presenting for the following health issues:  Prenatal Care (conception)      History of Present Illness       Reason for visit:  Prenatal    She eats 2-3 servings of fruits and vegetables daily.She consumes 1 sweetened beverage(s) daily.She exercises with enough effort to increase her heart rate 20 to 29 minutes per day.  She exercises with enough effort to increase her heart rate 3 or less days per week.   She is taking medications regularly.       Discuss blood work, low iron, and prenatal care.     Patient has been trying to get pregnant over the last couple months.  Currently taking new chapter prenatal multivitamins which are  recommended from the [facility] website.  No history of anemia testing in the past however she would like to be tested to rule out concerns.  Would like thyroid screening.  Taking the prenatal multivitamins over the last 1.5 months.  Fatigue over the last month.  Currently 3 days late on her menstrual cycle.  Took 2 pregnancy test today that were negative.  Left boob is tender today.  Has been completing ovulation strips over the last month.  Currently getting  this fall.  Keeping food and fluids down.  No GI or urinary symptoms.    Review of Systems   Constitutional, HEENT, cardiovascular, pulmonary, gi and gu systems are negative, except as otherwise noted.      Objective    /80 (BP Location: Right arm, Patient Position: Sitting, Cuff Size: Adult Regular)   Pulse 65   Temp 98.4  F (36.9  C) (Tympanic)   Resp 16   Wt 69.1 kg (152 lb 6.4 oz)   LMP 07/25/2022 (Exact Date)   SpO2 99%   Breastfeeding No   BMI 26.16 kg/m    Body mass index is 26.16 kg/m .  Physical Exam  Vitals and nursing note reviewed.   Constitutional:       Appearance: Normal appearance.   HENT:      Head: Normocephalic and atraumatic.   Eyes:      Extraocular Movements: Extraocular movements intact.      Conjunctiva/sclera: Conjunctivae normal.      Pupils: Pupils are equal, round, and reactive to light.   Cardiovascular:      Rate and Rhythm: Normal rate and regular rhythm.      Heart sounds: Normal heart sounds.   Pulmonary:      Effort: Pulmonary effort is normal.      Breath sounds: Normal breath sounds.   Musculoskeletal:         General: Normal range of motion.   Skin:     General: Skin is warm and dry.   Neurological:      General: No focal deficit present.      Mental Status: She is alert and oriented to person, place, and time.   Psychiatric:         Mood and Affect: Mood normal.         Behavior: Behavior normal.                    .  ..

## 2022-08-24 NOTE — PATIENT INSTRUCTIONS
-Take prenatal multivitamin daily.    -Encouraged increase of fluids and a balanced diet.    -Encouraged routine exercise.    -Abstain from alcohol, smoking and illicit drug use.   -Avoid taking medications like ibuprofen, advil, aspirin and aleve. You may take tylenol for pain.   -Limit caffeine consumption to less than 200 to 300 mg per day .

## 2022-08-24 NOTE — NURSING NOTE
Pt presents to clinic today for prenatal care. Patient would like to discuss blood work for pregnancy as she is late for her period, talk about low iron and pregnancy conception.       FOOD SECURITY SCREENING QUESTIONS:    The next two questions are to help us understand your food security.  If you are feeling you need any assistance in this area, we have resources available to support you today.    Hunger Vital Signs:  Within the past 12 months we worried whether our food would run out before we got money to buy more. Never  Within the past 12 months the food we bought just didn't last and we didn't have money to get more. Never          Medication Reconciliation: complete  Jossy Mohan LPN,LPN on 8/24/2022 at 2:59 PM

## 2022-08-24 NOTE — LETTER
My Depression Action Plan  Name: Mya Garcia   Date of Birth 2000  Date: 8/24/2022    My doctor: Gemini Alvarado   My clinic: [facility]  [address]  [place] [id number] [phone]          GREEN    ZONE   Good Control    What it looks like:     Things are going generally well. You have normal ups and downs. You may even feel depressed from time to time, but bad moods usually last less than a day.   What you need to do:  1. Continue to care for yourself (see self care plan)  2. Check your depression survival kit and update it as needed  3. Follow your physician s recommendations including any medication.  4. Do not stop taking medication unless you consult with your physician first.           YELLOW         ZONE Getting Worse    What it looks like:     Depression is starting to interfere with your life.     It may be hard to get out of bed; you may be starting to isolate yourself from others.    Symptoms of depression are starting to last most all day and this has happened for several days.     You may have suicidal thoughts but they are not constant.   What you need to do:     1. Call your care team. Your response to treatment will improve if you keep your care team informed of your progress. Yellow periods are signs an adjustment may need to be made.     2. Continue your self-care.  Just get dressed and ready for the day.  Don't give yourself time to talk yourself out of it.    3. Talk to someone in your support network.    4. Open up your Depression Self-Care Plan/Wellness Kit.           RED    ZONE Medical Alert - Get Help    What it looks like:     Depression is seriously interfering with your life.     You may experience these or other symptoms: You can t get out of bed most days, can t work or engage in other necessary activities, you have trouble taking care of basic hygiene, or basic responsibilities, thoughts of suicide or death that  will not go away, self-injurious behavior.     What you need to do:  1. Call your care team and request a same-day appointment. If they are not available (weekends or after hours) call your local crisis line, emergency room or 911.          Depression Self-Care Plan / Wellness Kit    Many people find that medication and therapy are helpful treatments for managing depression. In addition, making small changes to your everyday life can help to boost your mood and improve your wellbeing. Below are some tips for you to consider. Be sure to talk with your medical provider and/or behavioral health consultant if your symptoms are worsening or not improving.     Sleep   Sleep hygiene  means all of the habits that support good, restful sleep. It includes maintaining a consistent bedtime and wake time, using your bedroom only for sleeping or sex, and keeping the bedroom dark and free of distractions like a computer, smartphone, or television.     Develop a Healthy Routine  Maintain good hygiene. Get out of bed in the morning, make your bed, brush your teeth, take a shower, and get dressed. Don t spend too much time viewing media that makes you feel stressed. Find time to relax each day.    Exercise  Get some form of exercise every day. This will help reduce pain and release endorphins, the  feel good  chemicals in your brain. It can be as simple as just going for a walk or doing some gardening, anything that will get you moving.      Diet  Strive to eat healthy foods, including fruits and vegetables. Drink plenty of water. Avoid excessive sugar, caffeine, alcohol, and other mood-altering substances.     Stay Connected with Others  Stay in touch with friends and family members.    Manage Your Mood  Try deep breathing, massage therapy, biofeedback, or meditation. Take part in fun activities when you can. Try to find something to smile about each day.     Psychotherapy  Be open to working with a therapist if your provider  recommends it.     Medication  Be sure to take your medication as prescribed. Most anti-depressants need to be taken every day. It usually takes several weeks for medications to work. Not all medicines work for all people. It is important to follow-up with your provider to make sure you have a treatment plan that is working for you. Do not stop your medication abruptly without first discussing it with your provider.    Crisis Resources   These hotlines are for both adults and children. They and are open 24 hours a day, 7 days a week unless noted otherwise.      National Suicide Prevention Lifeline   988 or 5-762-580-MQYQ (1505)      Crisis Text Line    www.crisistextline.org  Text HOME to 700511 from anywhere in the [place], anytime, about any type of crisis. A live, trained crisis counselor will receive the text and respond quickly.      [place] Lifeline for LGBTQ Youth  A national crisis intervention and suicide lifeline for LGBTQ youth under 25. Provides a safe place to talk without judgement. Call 1-117.486.4615; text START to 084377 or visit www.thetrevorproject.org to talk to a trained counselor.      For [facility] crisis numbers, visit the [facility] website at:  https://[place].gov/[place]hs/people-we-serve/adults/health-care/mental-health/resources/crisis-contacts.jsp

## 2022-09-17 ENCOUNTER — HEALTH MAINTENANCE LETTER (OUTPATIENT)
Age: 22
End: 2022-09-17

## 2022-09-22 ENCOUNTER — OFFICE VISIT (OUTPATIENT)
Dept: FAMILY MEDICINE | Facility: OTHER | Age: 22
End: 2022-09-22
Attending: STUDENT IN AN ORGANIZED HEALTH CARE EDUCATION/TRAINING PROGRAM
Payer: COMMERCIAL

## 2022-09-22 ENCOUNTER — MYC MEDICAL ADVICE (OUTPATIENT)
Dept: FAMILY MEDICINE | Facility: OTHER | Age: 22
End: 2022-09-22

## 2022-09-22 VITALS
HEART RATE: 75 BPM | OXYGEN SATURATION: 99 % | DIASTOLIC BLOOD PRESSURE: 72 MMHG | RESPIRATION RATE: 16 BRPM | SYSTOLIC BLOOD PRESSURE: 126 MMHG | BODY MASS INDEX: 25.52 KG/M2 | TEMPERATURE: 98.5 F | WEIGHT: 149.5 LBS | HEIGHT: 64 IN

## 2022-09-22 DIAGNOSIS — N92.6 MISSED MENSES: Primary | ICD-10-CM

## 2022-09-22 LAB
B-HCG SERPL-ACNC: 4 IU/L
HCG SERPL QL: NEGATIVE

## 2022-09-22 PROCEDURE — 84702 CHORIONIC GONADOTROPIN TEST: CPT | Mod: ZL | Performed by: STUDENT IN AN ORGANIZED HEALTH CARE EDUCATION/TRAINING PROGRAM

## 2022-09-22 PROCEDURE — 36415 COLL VENOUS BLD VENIPUNCTURE: CPT | Mod: ZL | Performed by: STUDENT IN AN ORGANIZED HEALTH CARE EDUCATION/TRAINING PROGRAM

## 2022-09-22 PROCEDURE — 84703 CHORIONIC GONADOTROPIN ASSAY: CPT | Mod: ZL | Performed by: STUDENT IN AN ORGANIZED HEALTH CARE EDUCATION/TRAINING PROGRAM

## 2022-09-22 PROCEDURE — G0463 HOSPITAL OUTPT CLINIC VISIT: HCPCS

## 2022-09-22 PROCEDURE — 99213 OFFICE O/P EST LOW 20 MIN: CPT | Performed by: STUDENT IN AN ORGANIZED HEALTH CARE EDUCATION/TRAINING PROGRAM

## 2022-09-22 RX ORDER — PRENATAL VIT/IRON FUM/FOLIC AC 27MG-0.8MG
1 TABLET ORAL DAILY
Qty: 90 TABLET | Refills: 3 | Status: SHIPPED | OUTPATIENT
Start: 2022-09-22

## 2022-09-22 ASSESSMENT — ANXIETY QUESTIONNAIRES
5. BEING SO RESTLESS THAT IT IS HARD TO SIT STILL: NOT AT ALL
8. IF YOU CHECKED OFF ANY PROBLEMS, HOW DIFFICULT HAVE THESE MADE IT FOR YOU TO DO YOUR WORK, TAKE CARE OF THINGS AT HOME, OR GET ALONG WITH OTHER PEOPLE?: NOT DIFFICULT AT ALL
7. FEELING AFRAID AS IF SOMETHING AWFUL MIGHT HAPPEN: NOT AT ALL
GAD7 TOTAL SCORE: 7
GAD7 TOTAL SCORE: 7
4. TROUBLE RELAXING: SEVERAL DAYS
7. FEELING AFRAID AS IF SOMETHING AWFUL MIGHT HAPPEN: NOT AT ALL
IF YOU CHECKED OFF ANY PROBLEMS ON THIS QUESTIONNAIRE, HOW DIFFICULT HAVE THESE PROBLEMS MADE IT FOR YOU TO DO YOUR WORK, TAKE CARE OF THINGS AT HOME, OR GET ALONG WITH OTHER PEOPLE: NOT DIFFICULT AT ALL
3. WORRYING TOO MUCH ABOUT DIFFERENT THINGS: SEVERAL DAYS
GAD7 TOTAL SCORE: 7
6. BECOMING EASILY ANNOYED OR IRRITABLE: SEVERAL DAYS
2. NOT BEING ABLE TO STOP OR CONTROL WORRYING: MORE THAN HALF THE DAYS
1. FEELING NERVOUS, ANXIOUS, OR ON EDGE: MORE THAN HALF THE DAYS

## 2022-09-22 ASSESSMENT — PATIENT HEALTH QUESTIONNAIRE - PHQ9
SUM OF ALL RESPONSES TO PHQ QUESTIONS 1-9: 1
10. IF YOU CHECKED OFF ANY PROBLEMS, HOW DIFFICULT HAVE THESE PROBLEMS MADE IT FOR YOU TO DO YOUR WORK, TAKE CARE OF THINGS AT HOME, OR GET ALONG WITH OTHER PEOPLE: NOT DIFFICULT AT ALL
SUM OF ALL RESPONSES TO PHQ QUESTIONS 1-9: 1

## 2022-09-22 ASSESSMENT — PAIN SCALES - GENERAL: PAINLEVEL: NO PAIN (0)

## 2022-09-22 NOTE — PROGRESS NOTES
"  Assessment & Plan     Missed menses  Showed me pictures of positive pregnancy tests at home (did not sit out for long periods of time/followed instructions) and then a negative digital home pregnancy test. Would like a blood draw today which was negative. Could be too early so plan to continue monitoring. Continue prenatal vitamin.   - Pregnancy, Serum (HCG); Future  - Prenatal Vit-Fe Fumarate-FA (PRENATAL MULTIVITAMIN W/IRON) 27-0.8 MG tablet; Take 1 tablet by mouth daily  - Pregnancy, Serum (HCG)  - HCG quantitative pregnancy; Future  - HCG quantitative pregnancy             BMI:   Estimated body mass index is 25.66 kg/m  as calculated from the following:    Height as of this encounter: 1.626 m (5' 4\").    Weight as of this encounter: 67.8 kg (149 lb 8 oz).           No follow-ups on file.    Stephie Bolton MD  [facility]    Subjective   Mya is a 22 year old, presenting for the following health issues:  Amenorrhea (Positive home pregnancy test, LMP 08/18/22)      History of Present Illness       Reason for visit:  Pregnancy test    She eats 4 or more servings of fruits and vegetables daily.She consumes 0 sweetened beverage(s) daily.She exercises with enough effort to increase her heart rate 10 to 19 minutes per day.  She exercises with enough effort to increase her heart rate 4 days per week.   She is taking medications regularly.    Today's PHQ-9         PHQ-9 Total Score: 1    PHQ-9 Q9 Thoughts of better off dead/self-harm past 2 weeks :   Not at all    How difficult have these problems made it for you to do your work, take care of things at home, or get along with other people: Not difficult at all  Today's CINDI-7 Score: 7       Missed period   - 4 home pregnancy tests positive then a digital one negative  - LMP- 8/15/2022   - ttc currently since last month   - lower abd feels different today, bloated, no cramping or pain   - no bleeding or spotting, no changes in vaginal " "discharge  - taking a prenatal vitamin, not taking any other medications  - no tobacco alcohol or drug use   - is getting  next weekend so has had increased stress          Review of Systems   Constitutional, HEENT, cardiovascular, pulmonary, gi and gu systems are negative, except as otherwise noted.      Objective    /72 (BP Location: Right arm, Patient Position: Sitting, Cuff Size: Adult Regular)   Pulse 75   Temp 98.5  F (36.9  C) (Tympanic)   Resp 16   Ht 1.626 m (5' 4\")   Wt 67.8 kg (149 lb 8 oz)   LMP 08/18/2022   SpO2 99%   Breastfeeding No   BMI 25.66 kg/m    Body mass index is 25.66 kg/m .  Physical Exam   GENERAL: healthy, alert and no distress  RESP: lungs clear to auscultation - no rales, rhonchi or wheezes  CV: regular rate and rhythm, normal S1 S2, no S3 or S4, no murmur, click or rub, no peripheral edema and peripheral pulses strong  ABDOMEN: soft, nontender, no hepatosplenomegaly, no masses and bowel sounds normal  PSYCH: mentation appears normal, affect normal/bright    Results for orders placed or performed in visit on 09/22/22 (from the past 24 hour(s))   Pregnancy, Serum (HCG)   Result Value Ref Range    hCG Serum Qualitative Negative Negative   HCG quantitative pregnancy   Result Value Ref Range    hCG Quantitative 4 IU/L                   "

## 2022-09-22 NOTE — NURSING NOTE
Patient presents to clinic after having positive home pregnancy test.  Her Last menstrual cycle started on 08/18/22.    Medication Rec Complete  Linh Liao LPN............9/22/2022 1:06 PM

## 2022-09-23 ENCOUNTER — MYC MEDICAL ADVICE (OUTPATIENT)
Dept: FAMILY MEDICINE | Facility: OTHER | Age: 22
End: 2022-09-23

## 2022-09-23 DIAGNOSIS — N91.2 AMENORRHEA: Primary | ICD-10-CM

## 2022-09-26 ENCOUNTER — LAB (OUTPATIENT)
Dept: LAB | Facility: OTHER | Age: 22
End: 2022-09-26
Attending: PHYSICIAN ASSISTANT
Payer: COMMERCIAL

## 2022-09-26 ENCOUNTER — TELEPHONE (OUTPATIENT)
Dept: FAMILY MEDICINE | Facility: OTHER | Age: 22
End: 2022-09-26

## 2022-09-26 DIAGNOSIS — N91.2 AMENORRHEA: ICD-10-CM

## 2022-09-26 DIAGNOSIS — Z32.01 PREGNANCY TEST POSITIVE: Primary | ICD-10-CM

## 2022-09-26 LAB — B-HCG SERPL-ACNC: 19 IU/L

## 2022-09-26 PROCEDURE — 84702 CHORIONIC GONADOTROPIN TEST: CPT | Mod: ZL

## 2022-09-26 PROCEDURE — 36415 COLL VENOUS BLD VENIPUNCTURE: CPT | Mod: ZL

## 2022-09-26 NOTE — TELEPHONE ENCOUNTER
Urine pregnancy hCG test elevated mildly to 19.  I would recommend having a repeat hCG test completed in 2 days for monitoring.  Please set up a repeat test in 48 hours on 9/28.  Gemini Alvarado PA-C.......... 9/26/2022 4:52 PM

## 2022-09-27 NOTE — TELEPHONE ENCOUNTER
Called patient back, notified of results,   Was transferred to scheduling to make a lab only.  ..Jossy Mohan LPN on 9/27/2022 at 9:24 AM

## 2022-09-28 ENCOUNTER — MYC MEDICAL ADVICE (OUTPATIENT)
Dept: FAMILY MEDICINE | Facility: OTHER | Age: 22
End: 2022-09-28

## 2022-09-28 ENCOUNTER — LAB (OUTPATIENT)
Dept: LAB | Facility: OTHER | Age: 22
End: 2022-09-28
Attending: PHYSICIAN ASSISTANT
Payer: COMMERCIAL

## 2022-09-28 ENCOUNTER — TELEPHONE (OUTPATIENT)
Dept: FAMILY MEDICINE | Facility: OTHER | Age: 22
End: 2022-09-28

## 2022-09-28 DIAGNOSIS — Z32.01 PREGNANCY TEST POSITIVE: ICD-10-CM

## 2022-09-28 DIAGNOSIS — O02.81 INAPPROPRIATE CHANGE IN QUANTITATIVE HCG IN EARLY PREGNANCY: ICD-10-CM

## 2022-09-28 DIAGNOSIS — Z32.01 PREGNANCY TEST POSITIVE: Primary | ICD-10-CM

## 2022-09-28 LAB — B-HCG SERPL-ACNC: 19 IU/L

## 2022-09-28 PROCEDURE — 36415 COLL VENOUS BLD VENIPUNCTURE: CPT | Mod: ZL

## 2022-09-28 PROCEDURE — 84702 CHORIONIC GONADOTROPIN TEST: CPT | Mod: ZL

## 2022-09-28 NOTE — TELEPHONE ENCOUNTER
Called with results.  Ordered repeat hCG to be completed on 9/30.  Referred to OB/GYN for consult.  Currently 4 days late on her period.  Not having any menstrual bleeding or cramping.  Gemini Alvarado PA-C.......... 9/28/2022 12:50 PM

## 2022-09-29 ENCOUNTER — TELEPHONE (OUTPATIENT)
Dept: FAMILY MEDICINE | Facility: OTHER | Age: 22
End: 2022-09-29

## 2022-09-29 ENCOUNTER — MYC MEDICAL ADVICE (OUTPATIENT)
Dept: FAMILY MEDICINE | Facility: OTHER | Age: 22
End: 2022-09-29

## 2022-09-29 NOTE — TELEPHONE ENCOUNTER
Called and discussed concerns and questions with the patient.  Please cancel lab only appointment for the patient on 9/30.    Patient will call and set up a lab only appointment in 1 week.  Gemini Alvarado PA-C.......... 9/29/2022 10:23 AM

## 2022-09-29 NOTE — TELEPHONE ENCOUNTER
Patient would like a call back with [employer] answer to patients [employer] message.    Ana Jaime on 9/29/2022 at 9:43 AM

## 2022-09-29 NOTE — TELEPHONE ENCOUNTER
Called patient and answered questions.   DEIRDRE Jaramillo.................9/29/2022 10:53 AM

## 2022-10-07 ENCOUNTER — MYC MEDICAL ADVICE (OUTPATIENT)
Dept: FAMILY MEDICINE | Facility: OTHER | Age: 22
End: 2022-10-07

## 2022-10-07 ENCOUNTER — LAB (OUTPATIENT)
Dept: LAB | Facility: OTHER | Age: 22
End: 2022-10-07
Attending: PHYSICIAN ASSISTANT
Payer: COMMERCIAL

## 2022-10-07 DIAGNOSIS — Z11.3 SCREEN FOR STD (SEXUALLY TRANSMITTED DISEASE): Primary | ICD-10-CM

## 2022-10-07 DIAGNOSIS — Z32.01 PREGNANCY TEST POSITIVE: ICD-10-CM

## 2022-10-07 LAB — B-HCG SERPL-ACNC: <1 IU/L

## 2022-10-07 PROCEDURE — 36415 COLL VENOUS BLD VENIPUNCTURE: CPT | Mod: ZL

## 2022-10-07 PROCEDURE — 84702 CHORIONIC GONADOTROPIN TEST: CPT | Mod: ZL

## 2022-10-20 ENCOUNTER — LAB (OUTPATIENT)
Dept: LAB | Facility: OTHER | Age: 22
End: 2022-10-20
Payer: COMMERCIAL

## 2022-10-20 DIAGNOSIS — Z11.3 SCREEN FOR STD (SEXUALLY TRANSMITTED DISEASE): ICD-10-CM

## 2022-10-20 LAB
C TRACH DNA SPEC QL PROBE+SIG AMP: NEGATIVE
N GONORRHOEA DNA SPEC QL NAA+PROBE: NEGATIVE

## 2022-10-20 PROCEDURE — 87491 CHLMYD TRACH DNA AMP PROBE: CPT | Mod: ZL

## 2022-10-20 PROCEDURE — 87591 N.GONORRHOEAE DNA AMP PROB: CPT | Mod: ZL

## 2022-11-01 ENCOUNTER — MYC MEDICAL ADVICE (OUTPATIENT)
Dept: FAMILY MEDICINE | Facility: OTHER | Age: 22
End: 2022-11-01

## 2022-11-01 ENCOUNTER — OFFICE VISIT (OUTPATIENT)
Dept: OBGYN | Facility: OTHER | Age: 22
End: 2022-11-01
Attending: PHYSICIAN ASSISTANT
Payer: COMMERCIAL

## 2022-11-01 ENCOUNTER — MYC MEDICAL ADVICE (OUTPATIENT)
Dept: OBGYN | Facility: OTHER | Age: 22
End: 2022-11-01

## 2022-11-01 VITALS
WEIGHT: 153 LBS | DIASTOLIC BLOOD PRESSURE: 70 MMHG | BODY MASS INDEX: 26.26 KG/M2 | SYSTOLIC BLOOD PRESSURE: 118 MMHG | HEART RATE: 70 BPM

## 2022-11-01 DIAGNOSIS — O09.291 HISTORY OF MISCARRIAGE, CURRENTLY PREGNANT, FIRST TRIMESTER: ICD-10-CM

## 2022-11-01 DIAGNOSIS — R00.2 PALPITATIONS: Primary | ICD-10-CM

## 2022-11-01 DIAGNOSIS — Z32.01 PREGNANCY TEST POSITIVE: ICD-10-CM

## 2022-11-01 DIAGNOSIS — O02.81 INAPPROPRIATE CHANGE IN QUANTITATIVE HCG IN EARLY PREGNANCY: ICD-10-CM

## 2022-11-01 DIAGNOSIS — Z31.69 ENCOUNTER FOR PRECONCEPTION CONSULTATION: ICD-10-CM

## 2022-11-01 LAB
ANION GAP SERPL CALCULATED.3IONS-SCNC: 6 MMOL/L (ref 3–14)
ATRIAL RATE - MUSE: 80 BPM
BUN SERPL-MCNC: 13 MG/DL (ref 7–25)
CALCIUM SERPL-MCNC: 9.5 MG/DL (ref 8.6–10.3)
CALCIUM SERPL-MCNC: 9.5 MG/DL (ref 8.6–10.3)
CHLORIDE BLD-SCNC: 105 MMOL/L (ref 98–107)
CO2 SERPL-SCNC: 27 MMOL/L (ref 21–31)
CREAT SERPL-MCNC: 0.8 MG/DL (ref 0.6–1.2)
DIASTOLIC BLOOD PRESSURE - MUSE: NORMAL MMHG
GFR SERPL CREATININE-BSD FRML MDRD: >90 ML/MIN/1.73M2
GLUCOSE BLD-MCNC: 63 MG/DL (ref 70–105)
HOLD SPECIMEN: NORMAL
INTERPRETATION ECG - MUSE: NORMAL
MAGNESIUM SERPL-MCNC: 1.9 MG/DL (ref 1.9–2.7)
P AXIS - MUSE: 60 DEGREES
POTASSIUM BLD-SCNC: 3.4 MMOL/L (ref 3.5–5.1)
PR INTERVAL - MUSE: 142 MS
PROLACTIN SERPL-MCNC: 13 UG/L (ref 3–27)
QRS DURATION - MUSE: 70 MS
QT - MUSE: 366 MS
QTC - MUSE: 422 MS
R AXIS - MUSE: 55 DEGREES
SODIUM SERPL-SCNC: 138 MMOL/L (ref 134–144)
SYSTOLIC BLOOD PRESSURE - MUSE: NORMAL MMHG
T AXIS - MUSE: 41 DEGREES
TSH SERPL DL<=0.005 MIU/L-ACNC: 1.4 MU/L (ref 0.4–4)
VENTRICULAR RATE- MUSE: 80 BPM

## 2022-11-01 PROCEDURE — 84443 ASSAY THYROID STIM HORMONE: CPT | Mod: ZL | Performed by: OBSTETRICS & GYNECOLOGY

## 2022-11-01 PROCEDURE — 84146 ASSAY OF PROLACTIN: CPT | Mod: ZL | Performed by: OBSTETRICS & GYNECOLOGY

## 2022-11-01 PROCEDURE — G0463 HOSPITAL OUTPT CLINIC VISIT: HCPCS

## 2022-11-01 PROCEDURE — 93005 ELECTROCARDIOGRAM TRACING: CPT | Performed by: OBSTETRICS & GYNECOLOGY

## 2022-11-01 PROCEDURE — 93010 ELECTROCARDIOGRAM REPORT: CPT | Performed by: INTERNAL MEDICINE

## 2022-11-01 PROCEDURE — 99203 OFFICE O/P NEW LOW 30 MIN: CPT | Performed by: OBSTETRICS & GYNECOLOGY

## 2022-11-01 PROCEDURE — 80048 BASIC METABOLIC PNL TOTAL CA: CPT | Mod: ZL | Performed by: OBSTETRICS & GYNECOLOGY

## 2022-11-01 PROCEDURE — 83735 ASSAY OF MAGNESIUM: CPT | Mod: ZL | Performed by: OBSTETRICS & GYNECOLOGY

## 2022-11-01 PROCEDURE — 36415 COLL VENOUS BLD VENIPUNCTURE: CPT | Mod: ZL | Performed by: OBSTETRICS & GYNECOLOGY

## 2022-11-01 ASSESSMENT — PAIN SCALES - GENERAL: PAINLEVEL: MILD PAIN (2)

## 2022-11-01 NOTE — PROGRESS NOTES
CC: recent miscarriage  HPI:  Mya is a 22 year old female who presents for discussion of pregnancy planning. She is  and attempting to conceive off of birth control since August. She gets regular cycles. She uses an nighat and LH urinary ovulation kits to detect ovulation. She is on PNV's only. She has had some pelvic cramps since ovulation this month, but not severe. She runs a  and feels stressed. 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 Her  is in good health. She got pregnant last month and had an early pregnancy loss. Her hcg was neg as of 10/5/22. She believes she ovulated on 10/24. She has no significant medical or surgical history. She admits about 2-4 cups of coffee daily. She exercises regularly on [employer]. She has noted some episodes of rapid heart rate recently that can be sustained for a few hours. She deals with anxiety by using meditation and monthly counseling.    Patient's last menstrual period was 2022 (exact date).      OB History    Para Term  AB Living   0 0 0 0 0 0   SAB IAB Ectopic Multiple Live Births   0 0 0 0 0     Past Medical History:   Diagnosis Date     Anxiety disorder      Pneumonia     History of left lower lobe pneumonia times two.     Past Surgical History:   Procedure Laterality Date     NO HISTORY OF SURGERY       Social History     Socioeconomic History     Marital status: Single     Spouse name: Not on file     Number of children: Not on file     Years of education: Not on file     Highest education level: Not on file   Occupational History     Not on file   Tobacco Use     Smoking status: Never     Smokeless tobacco: Never   Vaping Use     Vaping Use: Never used   Substance and Sexual Activity     Alcohol use: Yes     Comment: occasional     Drug use: No     Sexual activity: Yes     Partners: Male     Birth control/protection: Condom, Implant   Other Topics Concern     Parent/sibling w/ CABG, MI or angioplasty before 65F  55M? Not Asked   Social History Narrative    No exposure to secondhand smoke.   Mom teaches  in [facility].    Family moved back from [place] the summer of 2007.    Mother   10/2011     Social Determinants of Health     Financial Resource Strain: Not on file   Food Insecurity: Not on file   Transportation Needs: Not on file   Physical Activity: Not on file   Stress: Not on file   Social Connections: Not on file   Intimate Partner Violence: Not on file   Housing Stability: Not on file     Family History   Problem Relation Age of Onset     Other - See Comments Mother         Psychiatric illness,depression and anxiety     Anxiety Disorder Father      Anxiety Disorder Maternal Grandmother      Cancer Maternal Grandfather        Current Outpatient Medications   Medication     Prenatal Vit-Fe Fumarate-FA (PRENATAL MULTIVITAMIN W/IRON) 27-0.8 MG tablet     No current facility-administered medications for this visit.     No Known Allergies  /70   Pulse 70   Wt 69.4 kg (153 lb)   LMP 09/28/2022 (Exact Date)   BMI 26.26 kg/m      REVIEW OF SYSTEMS  Neg except as above    Exam:  Constitutional: healthy, alert, active and no distress  CV: RRR with a few skipped beats with compensatory pause. G1 WIL at LSB accentuated with inhalation.  Resp: CTA B   EKG shows NSR with borderline LA enlargement    Lab:   Results for orders placed or performed in visit on 11/01/22   Extra Tube     Status: None (In process)    Narrative    The following orders were created for panel order Extra Tube.  Procedure                               Abnormality         Status                     ---------                               -----------         ------                     Extra Purple Top Tube[[id number]]                            In process                   Please view results for these tests on the individual orders.   EKG 12-lead complete w/read - Clinics     Status: None (Preliminary result)   Result Value Ref Range     Systolic Blood Pressure  mmHg    Diastolic Blood Pressure  mmHg    Ventricular Rate 80 BPM    Atrial Rate 80 BPM    ND Interval 142 ms    QRS Duration 70 ms     ms    QTc 422 ms    P Axis 60 degrees    R AXIS 55 degrees    T Axis 41 degrees    Interpretation ECG       Sinus rhythm  Possible Left atrial enlargement  Borderline ECG  No previous ECGs available         ASSESSMENT/PLAN :  1. Palpitations    2. Pregnancy test positive    3. Inappropriate change in quantitative hCG in early pregnancy    4. Encounter for preconception consultation      (R00.2) Palpitations  (primary encounter diagnosis)  Comment:   Plan: TSH Reflex GH, EKG 12-lead complete w/read -         Clinics, EKG 12-lead complete w/read - Clinics,        Basic Metabolic Panel, Calcium, Magnesium          If labs are normal would advise continued timed IC, and present for further workup if no conceptions in the next six months, or with another early pregnancy loss. She was given reassurance regarding her skipped beats, which may be affected by caffeine. Will notify of lab results when available.    Jose Aviles MD FACOG  8:19 AM 11/1/2022

## 2022-11-01 NOTE — NURSING NOTE
"Chief Complaint   Patient presents with     Consult     Hx of positive pregnancy test with hcg increasing.      Patient here to discuss chemical pregnancy.   Initial /70   Pulse 70   Wt 69.4 kg (153 lb)   LMP 09/28/2022 (Exact Date)   BMI 26.26 kg/m   Estimated body mass index is 26.26 kg/m  as calculated from the following:    Height as of 9/22/22: 1.626 m (5' 4\").    Weight as of this encounter: 69.4 kg (153 lb).  Medication Reconciliation: complete    FOOD SECURITY SCREENING QUESTIONS  Hunger Vital Signs:  Within the past 12 months we worried whether our food would run out before we got money to buy more. Never  Within the past 12 months the food we bought just didn't last and we didn't have money to get more. Never  Mariana Madrid LPN 11/1/2022 8:12 AM           Mariana Madrid LPN  "

## 2022-11-03 ENCOUNTER — TELEPHONE (OUTPATIENT)
Dept: FAMILY MEDICINE | Facility: OTHER | Age: 22
End: 2022-11-03

## 2022-11-03 ENCOUNTER — LAB (OUTPATIENT)
Dept: LAB | Facility: OTHER | Age: 22
End: 2022-11-03
Attending: OBSTETRICS & GYNECOLOGY
Payer: COMMERCIAL

## 2022-11-03 ENCOUNTER — MYC MEDICAL ADVICE (OUTPATIENT)
Dept: FAMILY MEDICINE | Facility: OTHER | Age: 22
End: 2022-11-03

## 2022-11-03 DIAGNOSIS — Z32.01 PREGNANCY TEST POSITIVE: ICD-10-CM

## 2022-11-03 LAB
B-HCG SERPL-ACNC: 17 IU/L
PROGEST SERPL-MCNC: 11.6 NG/ML

## 2022-11-03 PROCEDURE — 84144 ASSAY OF PROGESTERONE: CPT | Mod: ZL

## 2022-11-03 PROCEDURE — 36415 COLL VENOUS BLD VENIPUNCTURE: CPT | Mod: ZL

## 2022-11-03 PROCEDURE — 84702 CHORIONIC GONADOTROPIN TEST: CPT | Mod: ZL

## 2022-11-03 RX ORDER — PROGESTERONE 200 MG/1
400 CAPSULE ORAL 2 TIMES DAILY
Qty: 120 CAPSULE | Refills: 1 | Status: SHIPPED | OUTPATIENT
Start: 2022-11-03 | End: 2022-12-03

## 2022-11-07 ENCOUNTER — LAB (OUTPATIENT)
Dept: LAB | Facility: OTHER | Age: 22
End: 2022-11-07
Attending: OBSTETRICS & GYNECOLOGY
Payer: COMMERCIAL

## 2022-11-07 DIAGNOSIS — Z32.01 PREGNANCY TEST POSITIVE: ICD-10-CM

## 2022-11-07 LAB — B-HCG SERPL-ACNC: 223 IU/L

## 2022-11-07 PROCEDURE — 84702 CHORIONIC GONADOTROPIN TEST: CPT | Mod: ZL

## 2022-11-07 PROCEDURE — 36415 COLL VENOUS BLD VENIPUNCTURE: CPT | Mod: ZL

## 2022-11-14 ENCOUNTER — VIRTUAL VISIT (OUTPATIENT)
Dept: OBGYN | Facility: OTHER | Age: 22
End: 2022-11-14
Attending: OBSTETRICS & GYNECOLOGY
Payer: COMMERCIAL

## 2022-11-14 VITALS — HEIGHT: 65 IN | WEIGHT: 148 LBS | BODY MASS INDEX: 24.66 KG/M2

## 2022-11-14 DIAGNOSIS — O36.80X0 ENCOUNTER TO DETERMINE FETAL VIABILITY OF PREGNANCY, SINGLE OR UNSPECIFIED FETUS: Primary | ICD-10-CM

## 2022-11-14 PROCEDURE — 99207 PR OB VISIT-NO CHARGE - GICH ONLY: CPT | Mod: 93

## 2022-11-14 ASSESSMENT — PATIENT HEALTH QUESTIONNAIRE - PHQ9: SUM OF ALL RESPONSES TO PHQ QUESTIONS 1-9: 1

## 2022-11-17 ENCOUNTER — LAB (OUTPATIENT)
Dept: LAB | Facility: OTHER | Age: 22
End: 2022-11-17
Attending: OBSTETRICS & GYNECOLOGY
Payer: COMMERCIAL

## 2022-11-17 DIAGNOSIS — O36.80X0 ENCOUNTER TO DETERMINE FETAL VIABILITY OF PREGNANCY, SINGLE OR UNSPECIFIED FETUS: ICD-10-CM

## 2022-11-17 LAB — HCG INTACT+B SERPL-ACNC: 9140 MIU/ML

## 2022-11-17 PROCEDURE — 84702 CHORIONIC GONADOTROPIN TEST: CPT | Mod: ZL

## 2022-11-17 PROCEDURE — 36415 COLL VENOUS BLD VENIPUNCTURE: CPT | Mod: ZL

## 2022-11-27 ENCOUNTER — OFFICE VISIT (OUTPATIENT)
Dept: FAMILY MEDICINE | Facility: OTHER | Age: 22
End: 2022-11-27
Attending: PHYSICIAN ASSISTANT
Payer: COMMERCIAL

## 2022-11-27 VITALS
DIASTOLIC BLOOD PRESSURE: 74 MMHG | HEIGHT: 64 IN | BODY MASS INDEX: 26.09 KG/M2 | SYSTOLIC BLOOD PRESSURE: 100 MMHG | WEIGHT: 152.8 LBS | OXYGEN SATURATION: 99 % | HEART RATE: 70 BPM | TEMPERATURE: 99.3 F | RESPIRATION RATE: 16 BRPM

## 2022-11-27 DIAGNOSIS — B96.89 BACTERIAL VAGINOSIS: Primary | ICD-10-CM

## 2022-11-27 DIAGNOSIS — N94.9 VAGINAL SYMPTOM: ICD-10-CM

## 2022-11-27 DIAGNOSIS — N76.0 BACTERIAL VAGINOSIS: Primary | ICD-10-CM

## 2022-11-27 LAB
ALBUMIN UR-MCNC: NEGATIVE MG/DL
APPEARANCE UR: CLEAR
BILIRUB UR QL STRIP: NEGATIVE
CLUE CELLS: PRESENT
COLOR UR AUTO: YELLOW
GLUCOSE UR STRIP-MCNC: NEGATIVE MG/DL
HGB UR QL STRIP: NEGATIVE
KETONES UR STRIP-MCNC: NEGATIVE MG/DL
LEUKOCYTE ESTERASE UR QL STRIP: NEGATIVE
MUCOUS THREADS #/AREA URNS LPF: PRESENT /LPF
NITRATE UR QL: NEGATIVE
PH UR STRIP: 5 [PH] (ref 5–9)
RBC URINE: <1 /HPF
SP GR UR STRIP: 1.01 (ref 1–1.03)
TRICHOMONAS, WET PREP: ABNORMAL
UROBILINOGEN UR STRIP-MCNC: NORMAL MG/DL
WBC URINE: 1 /HPF
WBC'S/HIGH POWER FIELD, WET PREP: ABNORMAL
YEAST, WET PREP: ABNORMAL

## 2022-11-27 PROCEDURE — G0463 HOSPITAL OUTPT CLINIC VISIT: HCPCS

## 2022-11-27 PROCEDURE — 81001 URINALYSIS AUTO W/SCOPE: CPT | Mod: ZL | Performed by: NURSE PRACTITIONER

## 2022-11-27 PROCEDURE — 99214 OFFICE O/P EST MOD 30 MIN: CPT | Performed by: NURSE PRACTITIONER

## 2022-11-27 PROCEDURE — 87210 SMEAR WET MOUNT SALINE/INK: CPT | Mod: ZL | Performed by: NURSE PRACTITIONER

## 2022-11-27 RX ORDER — METRONIDAZOLE 500 MG/1
500 TABLET ORAL 2 TIMES DAILY
Qty: 14 TABLET | Refills: 0 | Status: SHIPPED | OUTPATIENT
Start: 2022-11-27 | End: 2022-12-04

## 2022-11-27 ASSESSMENT — PAIN SCALES - GENERAL: PAINLEVEL: NO PAIN (0)

## 2022-11-27 ASSESSMENT — ENCOUNTER SYMPTOMS
DIFFICULTY URINATING: 0
SHORTNESS OF BREATH: 0
ABDOMINAL PAIN: 0
CHILLS: 0
WOUND: 0
FLANK PAIN: 0
CHEST TIGHTNESS: 0
BRUISES/BLEEDS EASILY: 0
FREQUENCY: 0
DYSURIA: 0
CONFUSION: 0
HEMATURIA: 0
BACK PAIN: 0
FEVER: 0

## 2022-11-27 NOTE — PROGRESS NOTES
Assessment & Plan   Mya Gant is a 22 year old, presenting for the following health issues:      ICD-10-CM    1. Vaginal symptom  N94.9 UA with Microscopic reflex to Culture     Wet Prep, Genital        Vital signs stable. Physical exam consistent with bacterial vaginosis. Copious amount of thick, greenish-yellow discharge noted during pelvic exam. Denies any pain during exam.    Trace bilateral LE extremity edema. Patient does report eating more salt than usual over the past few days.     Wet prep shows clue cells and 4+ WBCs consistent with BV  Treat with metronidazole 500 mg BID x 7 days.      UA shows mucus, otherwise WNL.      UpToDate consulted for best practices and current guidelines for treatment of visit diagnoses. Risks and benefits discussed of medication vs. Not treating. CDC recommends treating pregnant patients with PO metronidazole vs. topical    Discussed signs/ symptoms that would warrant urgent/ emergent follow-up. Patient in agreement with plan. AVS reviewed with patient. All questions and concerns addressed this visit.       Return if symptoms worsen or fail to improve, for Return as needed for new or worsening symptoms.    JOSE ANTONIO Dyer CNP  [facility]        Lita Roque is a 22 year old, presenting for the following health issues:  Vaginal Problem      Patient presents with 4 day history of yellow-green discharge with slight odor starting yesterday. She is currently 7 weeks pregnant.              Review of Systems   Constitutional: Negative for chills and fever.   HENT: Negative for congestion.    Eyes: Negative for visual disturbance.   Respiratory: Negative for chest tightness and shortness of breath.    Cardiovascular: Negative for chest pain.   Gastrointestinal: Negative for abdominal pain.   Genitourinary: Positive for vaginal discharge. Negative for difficulty urinating, dysuria, flank pain, frequency, hematuria, vaginal bleeding and vaginal pain.  "  Musculoskeletal: Negative for back pain.   Skin: Negative for rash and wound.   Neurological: Negative for syncope.   Hematological: Does not bruise/bleed easily.   Psychiatric/Behavioral: Negative for confusion.            Objective    /74 (BP Location: Right arm, Patient Position: Sitting, Cuff Size: Adult Large)   Pulse 70   Temp 99.3  F (37.4  C) (Tympanic)   Resp 16   Ht 1.626 m (5' 4\")   Wt 69.3 kg (152 lb 12.8 oz)   LMP 09/28/2022 (Approximate)   SpO2 99%   BMI 26.23 kg/m    Body mass index is 26.23 kg/m .  Physical Exam  Vitals and nursing note reviewed. Exam conducted with a chaperone present.   Constitutional:       General: She is not in acute distress.     Appearance: She is well-developed. She is not diaphoretic.   HENT:      Head: Normocephalic and atraumatic.   Eyes:      General: No scleral icterus.     Conjunctiva/sclera: Conjunctivae normal.   Cardiovascular:      Rate and Rhythm: Normal rate and regular rhythm.      Pulses: Normal pulses.      Heart sounds: Normal heart sounds.   Pulmonary:      Effort: Pulmonary effort is normal.      Breath sounds: Normal breath sounds.   Abdominal:      Palpations: Abdomen is soft.      Tenderness: There is no abdominal tenderness.      Hernia: There is no hernia in the left inguinal area or right inguinal area.   Genitourinary:     General: Normal vulva.      Pubic Area: No rash or pubic lice.       Labia:         Right: No rash, tenderness, lesion or injury.         Left: No rash, tenderness, lesion or injury.       Vagina: No signs of injury and foreign body. Vaginal discharge (yellow-green, thick) present. No erythema, tenderness, bleeding, lesions or prolapsed vaginal walls.      Cervix: Discharge (yellow-green, thick) present.      Rectum: Normal.   Musculoskeletal:         General: No deformity.      Cervical back: Neck supple.      Right lower leg: Edema (trace) present.      Left lower leg: Edema (trace) present.   Lymphadenopathy:      " Cervical: No cervical adenopathy.      Lower Body: No right inguinal adenopathy.   Skin:     General: Skin is warm and dry.      Capillary Refill: Capillary refill takes less than 2 seconds.      Coloration: Skin is not jaundiced.      Findings: No rash.   Neurological:      Mental Status: She is alert and oriented to person, place, and time. Mental status is at baseline.   Psychiatric:         Mood and Affect: Mood normal.         Behavior: Behavior normal.              Results for orders placed or performed in visit on 11/27/22   UA with Microscopic reflex to Culture     Status: Abnormal    Specimen: Urine, Midstream   Result Value Ref Range    Color Urine Yellow Colorless, Straw, Light Yellow, Yellow    Appearance Urine Clear Clear    Glucose Urine Negative Negative mg/dL    Bilirubin Urine Negative Negative    Ketones Urine Negative Negative mg/dL    Specific Gravity Urine 1.006 1.000 - 1.030    Blood Urine Negative Negative    pH Urine 5.0 5.0 - 9.0    Protein Albumin Urine Negative Negative mg/dL    Urobilinogen Urine Normal Normal, 2.0 mg/dL    Nitrite Urine Negative Negative    Leukocyte Esterase Urine Negative Negative    Mucus Urine Present (A) None Seen /LPF    RBC Urine <1 <=2 /HPF    WBC Urine 1 <=5 /HPF    Narrative    Urine Culture not indicated   Wet Prep, Genital     Status: Abnormal    Specimen: Vagina; Swab   Result Value Ref Range    Trichomonas Absent Absent    Yeast Absent Absent    Clue Cells Present (A) Absent    WBCs/high power field 4+ (A) None

## 2022-11-27 NOTE — NURSING NOTE
Pt here for yellowish greenish discharge since Thursday morning.  Noticed odor yesterday.  No itching or burning.  Pt is 7 wks pregnant.  Sobia Michele CMA ([facility])......................11/27/2022  3:01 PM       Medication Reconciliation: complete    Sobia Michele CMA  11/27/2022 3:01 PM

## 2022-12-15 DIAGNOSIS — Z34.81 ENCOUNTER FOR SUPERVISION OF OTHER NORMAL PREGNANCY IN FIRST TRIMESTER: Primary | ICD-10-CM

## 2022-12-19 ENCOUNTER — HOSPITAL ENCOUNTER (OUTPATIENT)
Dept: ULTRASOUND IMAGING | Facility: OTHER | Age: 22
Discharge: HOME | End: 2022-12-19
Attending: OBSTETRICS & GYNECOLOGY | Admitting: OBSTETRICS & GYNECOLOGY
Payer: COMMERCIAL

## 2022-12-19 DIAGNOSIS — Z34.81 ENCOUNTER FOR SUPERVISION OF OTHER NORMAL PREGNANCY IN FIRST TRIMESTER: ICD-10-CM

## 2022-12-19 LAB
ABO/RH(D): NORMAL
ANTIBODY SCREEN: NEGATIVE
SPECIMEN EXPIRATION DATE: NORMAL

## 2022-12-19 PROCEDURE — 76801 OB US < 14 WKS SINGLE FETUS: CPT

## 2022-12-20 ENCOUNTER — PRENATAL OFFICE VISIT (OUTPATIENT)
Dept: OBGYN | Facility: OTHER | Age: 22
End: 2022-12-20
Attending: OBSTETRICS & GYNECOLOGY
Payer: COMMERCIAL

## 2022-12-20 VITALS
SYSTOLIC BLOOD PRESSURE: 122 MMHG | HEART RATE: 90 BPM | DIASTOLIC BLOOD PRESSURE: 72 MMHG | BODY MASS INDEX: 26.61 KG/M2 | WEIGHT: 155 LBS

## 2022-12-20 DIAGNOSIS — R00.2 PALPITATIONS: ICD-10-CM

## 2022-12-20 DIAGNOSIS — Z34.81 ENCOUNTER FOR SUPERVISION OF OTHER NORMAL PREGNANCY IN FIRST TRIMESTER: Primary | ICD-10-CM

## 2022-12-20 LAB
ALBUMIN UR-MCNC: NEGATIVE MG/DL
APPEARANCE UR: CLEAR
BACTERIA #/AREA URNS HPF: ABNORMAL /HPF
BASOPHILS # BLD AUTO: 0 10E3/UL (ref 0–0.2)
BASOPHILS NFR BLD AUTO: 1 %
BILIRUB UR QL STRIP: NEGATIVE
C TRACH DNA SPEC QL PROBE+SIG AMP: NEGATIVE
CLUE CELLS: ABNORMAL
COLOR UR AUTO: ABNORMAL
EOSINOPHIL # BLD AUTO: 0.1 10E3/UL (ref 0–0.7)
EOSINOPHIL NFR BLD AUTO: 1 %
ERYTHROCYTE [DISTWIDTH] IN BLOOD BY AUTOMATED COUNT: 13.1 % (ref 10–15)
GLUCOSE UR STRIP-MCNC: NEGATIVE MG/DL
HCT VFR BLD AUTO: 39.1 % (ref 35–47)
HGB BLD-MCNC: 13.6 G/DL (ref 11.7–15.7)
HGB UR QL STRIP: NEGATIVE
IMM GRANULOCYTES # BLD: 0 10E3/UL
IMM GRANULOCYTES NFR BLD: 1 %
KETONES UR STRIP-MCNC: NEGATIVE MG/DL
LEUKOCYTE ESTERASE UR QL STRIP: NEGATIVE
LYMPHOCYTES # BLD AUTO: 1.9 10E3/UL (ref 0.8–5.3)
LYMPHOCYTES NFR BLD AUTO: 24 %
MCH RBC QN AUTO: 29.2 PG (ref 26.5–33)
MCHC RBC AUTO-ENTMCNC: 34.8 G/DL (ref 31.5–36.5)
MCV RBC AUTO: 84 FL (ref 78–100)
MONOCYTES # BLD AUTO: 0.5 10E3/UL (ref 0–1.3)
MONOCYTES NFR BLD AUTO: 7 %
MUCOUS THREADS #/AREA URNS LPF: PRESENT /LPF
N GONORRHOEA DNA SPEC QL NAA+PROBE: NEGATIVE
NEUTROPHILS # BLD AUTO: 5.4 10E3/UL (ref 1.6–8.3)
NEUTROPHILS NFR BLD AUTO: 66 %
NITRATE UR QL: NEGATIVE
NRBC # BLD AUTO: 0 10E3/UL
NRBC BLD AUTO-RTO: 0 /100
PH UR STRIP: 6.5 [PH] (ref 5–9)
PLATELET # BLD AUTO: 240 10E3/UL (ref 150–450)
RBC # BLD AUTO: 4.66 10E6/UL (ref 3.8–5.2)
RBC URINE: 1 /HPF
SP GR UR STRIP: 1.01 (ref 1–1.03)
TRICHOMONAS, WET PREP: ABNORMAL
UROBILINOGEN UR STRIP-MCNC: NORMAL MG/DL
WBC # BLD AUTO: 8 10E3/UL (ref 4–11)
WBC URINE: 1 /HPF
WBC'S/HIGH POWER FIELD, WET PREP: ABNORMAL
YEAST, WET PREP: ABNORMAL

## 2022-12-20 PROCEDURE — 86901 BLOOD TYPING SEROLOGIC RH(D): CPT | Mod: ZL | Performed by: OBSTETRICS & GYNECOLOGY

## 2022-12-20 PROCEDURE — 87086 URINE CULTURE/COLONY COUNT: CPT | Mod: ZL | Performed by: OBSTETRICS & GYNECOLOGY

## 2022-12-20 PROCEDURE — 87340 HEPATITIS B SURFACE AG IA: CPT | Mod: ZL | Performed by: OBSTETRICS & GYNECOLOGY

## 2022-12-20 PROCEDURE — 87591 N.GONORRHOEAE DNA AMP PROB: CPT | Mod: ZL | Performed by: OBSTETRICS & GYNECOLOGY

## 2022-12-20 PROCEDURE — 86803 HEPATITIS C AB TEST: CPT | Mod: ZL | Performed by: OBSTETRICS & GYNECOLOGY

## 2022-12-20 PROCEDURE — 86780 TREPONEMA PALLIDUM: CPT | Mod: ZL | Performed by: OBSTETRICS & GYNECOLOGY

## 2022-12-20 PROCEDURE — 87210 SMEAR WET MOUNT SALINE/INK: CPT | Mod: ZL | Performed by: OBSTETRICS & GYNECOLOGY

## 2022-12-20 PROCEDURE — 85025 COMPLETE CBC W/AUTO DIFF WBC: CPT | Mod: ZL | Performed by: OBSTETRICS & GYNECOLOGY

## 2022-12-20 PROCEDURE — 87491 CHLMYD TRACH DNA AMP PROBE: CPT | Mod: ZL | Performed by: OBSTETRICS & GYNECOLOGY

## 2022-12-20 PROCEDURE — 99207 PR OB VISIT-NO CHARGE - GICH ONLY: CPT | Performed by: OBSTETRICS & GYNECOLOGY

## 2022-12-20 PROCEDURE — 81001 URINALYSIS AUTO W/SCOPE: CPT | Mod: ZL | Performed by: OBSTETRICS & GYNECOLOGY

## 2022-12-20 PROCEDURE — 87389 HIV-1 AG W/HIV-1&-2 AB AG IA: CPT | Mod: ZL | Performed by: OBSTETRICS & GYNECOLOGY

## 2022-12-20 PROCEDURE — 86762 RUBELLA ANTIBODY: CPT | Mod: ZL | Performed by: OBSTETRICS & GYNECOLOGY

## 2022-12-20 PROCEDURE — 36415 COLL VENOUS BLD VENIPUNCTURE: CPT | Mod: ZL | Performed by: OBSTETRICS & GYNECOLOGY

## 2022-12-20 ASSESSMENT — PAIN SCALES - GENERAL: PAINLEVEL: NO PAIN (0)

## 2022-12-20 NOTE — NURSING NOTE
"Chief Complaint   Patient presents with     Prenatal Care     New OB     Patient is here for new ob with multiple questions. Thinking about invitae.   Initial /72   Pulse 90   Wt 70.3 kg (155 lb)   LMP 09/28/2022 (Approximate)   BMI 26.61 kg/m   Estimated body mass index is 26.61 kg/m  as calculated from the following:    Height as of 11/27/22: 1.626 m (5' 4\").    Weight as of this encounter: 70.3 kg (155 lb).  Medication Reconciliation: complete    FOOD SECURITY SCREENING QUESTIONS  Hunger Vital Signs:  Within the past 12 months we worried whether our food would run out before we got money to buy more. Never  Within the past 12 months the food we bought just didn't last and we didn't have money to get more. Never  Mariana Madrid LPN 12/20/2022 9:56 AM           Mariana Madrid LPN  "

## 2022-12-20 NOTE — PROGRESS NOTES
CC: New OB visit  HPI:  Mya Gant is  at 11w6d based on first trimester US from 22.  She notes issues of fatigue which is resolving. No vaginal bleeding. She has an echocardiogram scheduled for this Friday due to abnormal cardiac rhythm, and LA enlargement on ECG last month. She was treated recently with oral flagyl for BV. She notes continuance of some white to yellow vaginal discharge. She was placed on progesterone supplementation early in the pregnancy as well. She has now completed her progesterone.    OB History    Para Term  AB Living   1 0 0 0 0 0   SAB IAB Ectopic Multiple Live Births   0 0 0 0 0      # Outcome Date GA Lbr Landon/2nd Weight Sex Delivery Anes PTL Lv   1 Current              STI: (denies HSV, Hep C, Hep B, HIV, Syphilis, Chlamydia, Gonorrhea)  Last pap smear:   Lab Results   Component Value Date    PAP NIL 10/30/2020     Past Medical History:   Diagnosis Date     Anxiety disorder      Pneumonia     History of left lower lobe pneumonia times two.      has a past surgical history that includes no history of surgery.    Social History     Tobacco Use     Smoking status: Never     Smokeless tobacco: Never   Vaping Use     Vaping Use: Never used   Substance Use Topics     Alcohol use: Not Currently     Comment: occasional     Drug use: Never     Family History   Problem Relation Age of Onset     Other - See Comments Mother         Psychiatric illness,depression and anxiety     Anxiety Disorder Father      Anxiety Disorder Maternal Grandmother      Cancer Maternal Grandfather          Current Outpatient Medications   Medication     Prenatal Vit-Fe Fumarate-FA (PRENATAL MULTIVITAMIN W/IRON) 27-0.8 MG tablet     No current facility-administered medications for this visit.     No Known Allergies  Immunization History   Administered Date(s) Administered     Comvax (HIB/HepB) 1999, 1999, 2000     DTAP (<7y) 1999, 1999, 2000, 2000,  01/18/2001, 08/24/2004     FLU 6-35 months 10/22/2013     Flu, Unspecified 10/22/2013, 09/22/2014     HPV Quadrivalent 01/23/2015, 03/27/2015, 08/17/2015     HepA-ped 2 Dose 01/23/2015, 08/17/2015     Influenza, Whole Virus 09/22/2014     MMR 01/11/2002, 01/11/2002, 08/24/2004     Meningococcal ACWY (Menactra ) 01/23/2015, 06/09/2016     OPV, unspecified 1999     Polio, Unspecified  1999, 1999, 08/24/2004     Poliovirus, inactivated (IPV) 1999, 08/03/2000     TDAP Vaccine (Boostrix) 07/18/2011     Varicella 08/03/2000, 05/30/2007, 07/27/2011     REVIEW OF SYSTEMS  Neg except as above  Notes occasional SOB after exercising. She is a swimmer and a runner.    EXAM: /72   Pulse 90   Wt 70.3 kg (155 lb)   LMP 09/28/2022 (Approximate)   BMI 26.61 kg/m    Gen: NAD  CV: RRR with normal S1, S2, no GRM, has a noticeable reduction in heart rate with inspiration.  Resp: CTA Bilaterally  Neck: supple without thyromegaly mass or noduels  Pelvic exam: vulva, urethra, anus, BUS normal, vagina and cervix normal, pelvimetry grossly normal,  chaperoned by nurse.  Extremities: No TTP, no deformity  Neuro: CN II-XII intact grossly, moves all extremities  Psych: normal affect and mentation.    Bedside US shows continued viability    Recent Results (from the past 24 hour(s))   US OB < 14 Weeks Single    Narrative    US OB < 14 WEEKS SINGLE-TRANSABDOMINAL    Gestational age by LMP: 11 weeks 5 days    History: Encounter for supervision of other normal pregnancy in first  trimester .    Comparison: None.    Gestation: Single  Cardiac activity:  Regular  Heart rate:  163 bpm  Adnexa:  No mass.  Other: No subchorionic hemorrhage is seen.  CRL:  34 mm for an estimated gestational age of 10 weeks 3 days.      Impression    Impression:  Single viable intrauterine pregnancy with an estimated  delivery date of 7/14/2023.    TOSIN WOMACK MD         SYSTEM ID:  [id number]       I/P  (Z34.81) Encounter for  supervision of other normal pregnancy in first trimester  (primary encounter diagnosis)  Comment:   Plan:     Plan to follow through on Echo for abnormal rhythm audibly, and LA enlargement on echo prior to pregnancy.    Suspect vaginal discharge is physiologic with pregnancy, but may treat if wet prep shows clue cells.   Flu and COVID vaccinations recommended as per ACOG and [facility] guidelines.  Return visit in 1 month     Pregnancy risk factors include:  Left Atrial enlargement on ECG at beginning of pregnancy.  Palpitations, dyspnea with exercise. Echo pending    Jose Aviles MD FACOG  9:59 AM 12/20/2022

## 2022-12-21 LAB
HBV SURFACE AG SERPL QL IA: NONREACTIVE
HCV AB SERPL QL IA: NONREACTIVE
HIV 1+2 AB+HIV1 P24 AG SERPL QL IA: NONREACTIVE
RUBV IGG SERPL QL IA: 2.73 INDEX
RUBV IGG SERPL QL IA: POSITIVE
T PALLIDUM AB SER QL: NONREACTIVE

## 2022-12-22 LAB — BACTERIA UR CULT: NORMAL

## 2022-12-23 ENCOUNTER — MYC MEDICAL ADVICE (OUTPATIENT)
Dept: OBGYN | Facility: OTHER | Age: 22
End: 2022-12-23

## 2022-12-23 ENCOUNTER — HOSPITAL ENCOUNTER (OUTPATIENT)
Dept: CARDIOLOGY | Facility: OTHER | Age: 22
Discharge: HOME | End: 2022-12-23
Attending: OBSTETRICS & GYNECOLOGY | Admitting: OBSTETRICS & GYNECOLOGY
Payer: COMMERCIAL

## 2022-12-23 DIAGNOSIS — R00.2 PALPITATIONS: ICD-10-CM

## 2022-12-23 LAB — LVEF ECHO: NORMAL

## 2022-12-23 PROCEDURE — 93306 TTE W/DOPPLER COMPLETE: CPT

## 2022-12-23 PROCEDURE — 93306 TTE W/DOPPLER COMPLETE: CPT | Mod: 26 | Performed by: INTERNAL MEDICINE

## 2022-12-26 NOTE — TELEPHONE ENCOUNTER
"Called patients and discussed UpToDate information \"A patent foramen ovale, or \"PFO,\" is a small opening inside the heart. The opening is between the upper 2 chambers of the heart, which are called the right atrium and left atrium (figure 1). A PFO lets blood flow between these chambers.  Before birth, when a baby is growing in the mother's uterus, an opening between the right atrium and left atrium is normal. It lets blood flow through the heart in the correct way. (The way blood flows through the heart before birth is different from the way it flows through the heart after birth.)  After birth, an opening between the right atrium and left atrium is not needed anymore. In most babies, the opening closes on its own soon after birth. But in some babies, it does not close. When this happens, doctors call it a PFO. This is very common. About 1 out of every 4 people has a PFO. Doctors don't know what causes a PFO. Most people have no symptoms or problems from their PFO. Some people might find out they have it when their doctor does a test for another reason.  In some cases, a PFO can lead to problems. Although uncommon, some PFOs can lead to a stroke. A stroke is when a part of the brain is damaged because of a problem with blood flow. It can cause problems with speaking, thinking, or moving the arms or legs.  A PFO can lead to a stroke in the following way: A blood clot can form in a leg vein. The blood clot can travel through the blood to the heart. It then enters the right atrium. If a person has a PFO, the blood clot can then flow into the left atrium. From there, it flows into the left ventricle and then to the body or brain. A blood clot that travels to the brain can cause a stroke.  If your PFO causes no symptoms, it does not need treatment   Your doctor might also recommend things you can do on your own to prevent blood clots in your legs. To help prevent blood clots in your legs, you can:  ?Avoid sitting or " Mirela Hays 
 
 
 [address] [address] [facility] 
[phone] Patient: Yue Acevedo MRN: [id number] :1957 You are allergic to the following Allergen Reactions Ciprofloxacin Angioedema Recent Documentation Height Weight Breastfeeding? BMI OB Status Smoking Status 1.549 m 73.5 kg No 30.62 kg/m2 Postmenopausal Never Smoker Unresulted Labs Order Current Status OVA & PARASITES, STOOL In process TYPE & CROSSMATCH Preliminary result Emergency Contacts Name Discharge Info Relation Home Work Mobile [address]  Spouse [3] [phone] [facility] DISCHARGE CAREGIVER [3] Child [2] [phone] About your hospitalization You were admitted on:  2017 You last received care in the:  [facility] 3 PRO CARE TELE 2 You were discharged on:  2017 Unit phone number:  [phone] Why you were hospitalized Your primary diagnosis was:  Not on File Your diagnoses also included:  Gib (Gastrointestinal Bleeding), Calculus Of Gallbladder Without Cholecystitis, Gi Bleed Providers Seen During Your Hospitalizations Provider Role Specialty Primary office phone Abril Hernandez MD Attending Provider Emergency Medicine [phone] Abril Joyce DO Attending Provider Family Practice [phone] Georges Parnell MD Attending Provider Family Practice [phone] Jose Bone MD Attending Provider [facility] [phone] Your Primary Care Physician (PCP) Primary Care Physician Office Phone Office Fax Tony Najera [phone] Follow-up Information Follow up With Details Comments Contact Info Jameson George MD Go on 3/15/2017 Hospital follow-up . 10:30 am [address] [place] [address] 
[phone] Jessica Conti MD Schedule an appointment as soon as possible for a visit in 2 weeks Hospital follow-up [address] 
[phone] Your Appointments Wednesday March 15, 2017 10:30 AM EDT TRANSITIONAL CARE MANAGEMENT with Saman Ward MD  
[address] [facility] [address] [facility]  
[phone] Current Discharge Medication List  
  
START taking these medications Dose & Instructions Dispensing Information Comments Morning Noon Evening Bedtime  
 amoxicillin-clavulanate 500-125 mg per tablet Commonly known as:  AUGMENTIN Your last dose was: Your next dose is: Other:  _________ Dose:  1 Tab Take 1 Tab by mouth every twelve (12) hours for 14 days. Quantity:  28 Tab Refills:  0  
     
   
   
   
  
 losartan 100 mg tablet Commonly known as:  COZAAR Your last dose was: Your next dose is: Other:  _________ Dose:  100 mg Take 1 Tab by mouth daily. Quantity:  30 Tab Refills:  0 CONTINUE these medications which have NOT CHANGED Dose & Instructions Dispensing Information Comments Morning Noon Evening Bedtime  
 amLODIPine 10 mg tablet Commonly known as:  Elyn Coffer Your last dose was: Your next dose is: Other:  _________ Dose:  10 mg Take 10 mg by mouth daily. Refills:  0  
     
   
   
   
  
 aspirin delayed-release 81 mg tablet Your last dose was: Your next dose is: Other:  _________  
   
   
 take 2 tablets by mouth once daily Quantity:  60 Tab Refills:  3  
     
   
   
   
  
 atorvastatin 10 mg tablet Commonly known as:  LIPITOR Your last dose was: Your next dose is: Other:  _________ Dose:  10 mg Take 1 Tab by mouth nightly. Quantity:  30 Tab standing still in the same position for a long time  ?Stand up and walk around every 1 to 2 hours, including when you take long trips by car, train, or plane  ?Change your position when you are sitting, and move your legs and feet often  ?Not smoke  She has no further questions.     JOSE ANTONIO Le CNP on 12/26/2022 at 11:30 AM          Refills:  5  
     
   
   
   
  
 dorzolamide-timolol 22.3-6.8 mg/mL ophthalmic solution Commonly known as:  COSOPT Your last dose was: Your next dose is: Other:  _________ Dose:  1 Drop Administer 1 Drop to both eyes two (2) times a day. Refills:  0  
     
   
   
   
  
 glipiZIDE 5 mg tablet Commonly known as:  Jeff Luke Your last dose was: Your next dose is: Other:  _________  
   
   
 take 1 tablet by mouth once daily Quantity:  30 Tab Refills:  5  
     
   
   
   
  
 labetalol 200 mg tablet Commonly known as:  Larna Canes Your last dose was: Your next dose is: Other:  _________ Dose:  200 mg Take 200 mg by mouth two (2) times a day. Refills:  0  
     
   
   
   
  
 TYLENOL PM PO Your last dose was: Your next dose is: Other:  _________ Dose:  2 Tab Take 2 Tabs by mouth nightly as needed (sleep). Refills:  0 STOP taking these medications   
 losartan-hydroCHLOROthiazide 100-25 mg per tablet Commonly known as:  HYZAAR Where to Get Your Medications Information on where to get these meds will be given to you by the nurse or doctor. ! Ask your nurse or doctor about these medications  
  amoxicillin-clavulanate 500-125 mg per tablet  
 losartan 100 mg tablet Discharge Instructions HOME DISCHARGE INSTRUCTIONS Casper Humphries / [id number] : 1957 Admission date: 3/9/2017 Discharge date: 3/12/2017 10:28 PM  
 
Please bring this form with you to show your care provider at your follow-up appointment. Primary care provider:  Saman Ward MD 
 
Discharging provider: Starla Wang DO  - Family Medicine Resident Dr. Dustin Manriquez MD - [facility] Attending FINAL DIAGNOSES: 
GI bleed Alfredito Flanagan . . . . . . . . . . . . . . . . . . . . . . . . . . . . . . . . . . . Alfredito Flanagan . . . . . . . . . . . . . . . . . . . . . . . . . . . . . . . . . . Ananth Pickett FOLLOW-UP CARE RECOMMENDATIONS: 
 
Appointments · Follow-up with: Follow-up Information Follow up With Details Comments Contact Info Dane Suazo MD Schedule an appointment as soon as possible for a visit in 2 days  [address] [place] [address] 
[phone] · Please stop Hyzaar, as medication may hurt your kidney function · Start Losartan (cozaar) Follow-up with PCP this week. Please check Hemoglobin level, Blood pressure, and kidney function Please take antibiotic for 2 weeks and then follow-up with GI outpatient Please let you physician know if you see any blood in your stool. Follow-up tests needed: CBC, Cr, K Pending test results: At the time of your discharge the following test results are still pending: Stool studies. Please make sure you review these results with your outpatient follow-up provider(s). Specific symptoms to watch for: chest pain, shortness of breath, fever, chills, nausea, vomiting, diarrhea, change in mentation, falling, weakness, bleeding. DIET/what to eat:  Regular Diet ACTIVITY:  Activity as tolerated Wound care: None Equipment needed:  None What to do if new or unexpected symptoms occur? If you experience any of the above symptoms (or should other concerns or questions arise after discharge) please call your primary care physician. Return to the emergency room if you cannot get hold of your doctor. · It is very important that you keep your follow-up appointment(s). · Please bring discharge papers, medication list (and/or medication bottles) to your follow-up appointments for review by your outpatient provider(s). · Please check the list of medications and be sure it includes every medication (even non-prescription medications) that your provider wants you to take. · It is important that you take the medication exactly as they are prescribed. · Keep your medication in the bottles provided by the pharmacist and keep a list of the medication names, dosages, and times to be taken in your wallet. · Do not take other medications without consulting your doctor. · If you have any questions about your medications or other instructions, please talk to your nurse or care provider before you leave the hospital.  
 
Information obtained by:  
 
I understand that if any problems occur once I am at home I am to contact my physician. These instructions were explained to me and I had the opportunity to ask questions. I understand and acknowledge receipt of the instructions indicated above. Physician's or R.N.'s Signature                                                                  Date/Time Patient or Representative Signature                                                          Date/Time [facility]charge Orders None [facility] Announcement We are excited to announce that we are making your provider's discharge notes available to you in [place]. You will see these notes when they are completed and signed by the physician that discharged you from your recent hospital stay. If you have any questions or concerns about any information you see in [place], please call the Health Information Department where you were seen or reach out to your Primary Care Provider for more information about your plan of care. General Information Please provide this summary of care documentation to your next provider.  
  
  
    
    
 Patient Signature: ____________________________________________________________ Date:  ____________________________________________________________  
  
Sanna Germain Provider Signature:  ____________________________________________________________ Date:  ____________________________________________________________

## 2022-12-28 LAB — SCANNED LAB RESULT: NORMAL

## 2023-01-02 LAB — SCANNED LAB RESULT: NORMAL

## 2023-01-05 ENCOUNTER — OFFICE VISIT (OUTPATIENT)
Dept: CARDIOLOGY | Facility: OTHER | Age: 23
End: 2023-01-05
Attending: NURSE PRACTITIONER
Payer: COMMERCIAL

## 2023-01-05 VITALS
HEART RATE: 105 BPM | OXYGEN SATURATION: 99 % | RESPIRATION RATE: 16 BRPM | WEIGHT: 158.2 LBS | SYSTOLIC BLOOD PRESSURE: 132 MMHG | DIASTOLIC BLOOD PRESSURE: 78 MMHG | BODY MASS INDEX: 27.15 KG/M2 | TEMPERATURE: 98.4 F

## 2023-01-05 DIAGNOSIS — Q21.12 PFO (PATENT FORAMEN OVALE): ICD-10-CM

## 2023-01-05 DIAGNOSIS — R00.2 PALPITATIONS: Primary | ICD-10-CM

## 2023-01-05 DIAGNOSIS — Z34.81 ENCOUNTER FOR SUPERVISION OF OTHER NORMAL PREGNANCY IN FIRST TRIMESTER: ICD-10-CM

## 2023-01-05 PROCEDURE — 93005 ELECTROCARDIOGRAM TRACING: CPT | Performed by: NURSE PRACTITIONER

## 2023-01-05 PROCEDURE — G0463 HOSPITAL OUTPT CLINIC VISIT: HCPCS

## 2023-01-05 PROCEDURE — 99215 OFFICE O/P EST HI 40 MIN: CPT | Performed by: NURSE PRACTITIONER

## 2023-01-05 PROCEDURE — 93010 ELECTROCARDIOGRAM REPORT: CPT | Performed by: INTERNAL MEDICINE

## 2023-01-05 ASSESSMENT — PAIN SCALES - GENERAL: PAINLEVEL: NO PAIN (0)

## 2023-01-05 NOTE — NURSING NOTE
"Chief Complaint   Patient presents with     Heart Problem     Heart palpitations       Medication reconciliation completed.    FOOD SECURITY SCREENING QUESTIONS:    The next two questions are to help us understand your food security.  If you are feeling you need any assistance in this area, we have resources available to support you today.    Hunger Vital Signs:  Within the past 12 months we worried whether our food would run out before we got money to buy more. Never  Within the past 12 months the food we bought just didn't last and we didn't have money to get more. Never    Initial /78 (BP Location: Right arm, Patient Position: Sitting, Cuff Size: Adult Regular)   Pulse 105   Temp 98.4  F (36.9  C) (Tympanic)   Resp 16   Wt 71.8 kg (158 lb 3.2 oz)   LMP 09/28/2022 (Approximate)   SpO2 99%   BMI 27.15 kg/m   Estimated body mass index is 27.15 kg/m  as calculated from the following:    Height as of 11/27/22: 1.626 m (5' 4\").    Weight as of this encounter: 71.8 kg (158 lb 3.2 oz).       Malorie Hung LPN .......  1/5/2023  12:53 PM  "

## 2023-01-05 NOTE — PATIENT INSTRUCTIONS
You will receive a phone call to scheduled placement of cardiac monitor.   Return to cardiology clinic in 4 weeks to go over results.

## 2023-01-05 NOTE — PROGRESS NOTES
[facility] HEART CARE   CARDIOLOGY CONSULT     Mya Gant   [address] [place] [address]    Gemini Alvarado     Chief Complaint   Patient presents with     Heart Problem     Heart palpitations        HPI:   Ms. Gant is a 22 year old female who presents for cardiology evaluation with increased palpitations. She is currently pregnant, , 14 weeks gestation. She has a history of CINDI. Family history positive for known PFO in maternal aunt, no other family history of cardiac disease or congenital cardiac conditions.     She had a recent ECG (22) revealing SR with possible LA enlargement.     Echocardiogram reviewed from 22- Normal biventricular function, LVEF 55-60%. Both atria normal in size, there was a left to right shunt likely due to small PFO. Valves are normal in structure and function. No pericardial effusion.     Today, patient describes palpitations first starting this last fall. No history of palpitations prior to this. She describes increased palpitations when laying down or sitting and resting. Typically notes symptoms in the evening. Getting up and walking around does help relieve symptoms. She describes the palpitations as rapid regular heart beat. Palpitations lasting few minutes, up to 20 minutes in duration at the longest. No other associated symptoms. No chest pain or pressure. No lightheadedness or syncope. No edema. No increased dyspnea at rest or with exertion.     IMAGING RESULTS:   Echocardiogram 22-  Interpretation Summary  Global and regional left ventricular function is normal with an EF of 55-60%.  Global right ventricular function is normal.  Trivial left to right shunt based on color Doppler likely due to small patent  foramen ovale.  No valvular pathology.  No pericardial effusion is present.  There is no prior study for direct comparison.    PAST MEDICAL HISTORY:   Past Medical History:   Diagnosis Date     Anxiety disorder      Jaundice       Migraines      Pneumonia     History of left lower lobe pneumonia times two.     Varicella           FAMILY HISTORY:   Family History   Problem Relation Age of Onset     Other - See Comments Mother         Psychiatric illness,depression and anxiety     Anxiety Disorder Father      Anxiety Disorder Maternal Grandmother      Cancer Maternal Grandfather           PAST SURGICAL HISTORY:   Past Surgical History:   Procedure Laterality Date     NO HISTORY OF SURGERY            SOCIAL HISTORY:   Social History     Socioeconomic History     Marital status: Single     Spouse name: None     Number of children: None     Years of education: None     Highest education level: None   Tobacco Use     Smoking status: Never     Smokeless tobacco: Never   Vaping Use     Vaping Use: Never used   Substance and Sexual Activity     Alcohol use: Not Currently     Comment: occasional     Drug use: Never     Sexual activity: Yes     Partners: Male     Birth control/protection: None   Social History Narrative    No exposure to secondhand smoke.   Mom teaches  in [facility].    Family moved back from [place] the summer of 2007.    Mother   10/2011          CURRENT MEDICATIONS:   Prior to Admission medications    Medication Sig Start Date End Date Taking? Authorizing Provider   Prenatal Vit-Fe Fumarate-FA (PRENATAL MULTIVITAMIN W/IRON) 27-0.8 MG tablet Take 1 tablet by mouth daily 9/22/22  Yes Stephie Rosas MD          ALLERGIES:   No Known Allergies       ROS:   CONSTITUTIONAL: No reported fever or chills. No changes in weight.  ENT: No visual disturbance, ear ache, epistaxis or sore throat.   CARDIOVASCULAR: No chest pain, chest pressure or chest discomfort. Positive for palpitations, see HPI. No lower extremity edema.   RESPIRATORY: No shortness of breath, dyspnea upon exertion, cough, wheezing or hemoptysis.  No orthopnea or PND.  GI: No abdominal pain, nausea or vomiting. Positive for loose stools last two  days.  : No hematuria or dysuria, no abnormal vaginal discharge or bleeding.   NEUROLOGICAL: No lightheadedness, dizziness, syncope, ataxia, paresthesias or weakness.   HEMATOLOGIC: No history of anemia. No bleeding or excessive bruising. No history of blood clots. She has taken OTC iron supplement due to family history of ANGEL reported.   MUSCULOSKELETAL: No new joint pain or swelling, no muscle pain.  ENDOCRINOLOGIC: No temperature intolerance. No hair or skin changes.  SKIN: No abnormal rashes or sores, no unusual itching.  PSYCHIATRIC: Positive for history of anxiety.    PHYSICAL EXAM:   /78 (BP Location: Right arm, Patient Position: Sitting, Cuff Size: Adult Regular)   Pulse 105   Temp 98.4  F (36.9  C) (Tympanic)   Resp 16   Wt 71.8 kg (158 lb 3.2 oz)   LMP 09/28/2022 (Approximate)   SpO2 99%   BMI 27.15 kg/m    GENERAL: The patient is a well-developed, well-nourished, in no apparent distress.  HEENT: Head is normocephalic and atraumatic. Eyes are symmetrical with normal visual tracking. No icterus, no xanthelasmas. Nares appeared normal without nasal drainage. Mucous membranes are moist, no cyanosis.  NECK: Supple. No adenopathy, asymmetry or masses. Carotid upstroke are normal bilaterally, no cervical bruits, JVP not visible.   CHEST/ LUNGS: Lungs clear to auscultation, no rales, rhonchi or wheezes, no use of accessory muscles, no retractions, respirations unlabored and normal respiratory rate.   CARDIO: Regular rate and rhythm normal with S1 and S2, no S3 or S4 and no murmur, click or rub. Precordium quiet with normal PMI.    ABD: Abdomen is nondistended.   EXTREMITIES: No edema present.   MUSCULOSKELETAL: No joint swelling.   NEUROLOGIC: Alert and oriented X3. Normal speech, gait and affect. No focal neurologic deficits.   SKIN: No jaundice. No rashes or visible skin lesions present. No ecchymosis.     EKG:    Normal sinus rhythm, rate 67 bpm    LAB RESULTS:   Prenatal Office Visit on  12/20/2022   Component Date Value Ref Range Status     Color Urine 12/20/2022 Light Yellow  Colorless, Straw, Light Yellow, Yellow Final     Appearance Urine 12/20/2022 Clear  Clear Final     Glucose Urine 12/20/2022 Negative  Negative mg/dL Final     Bilirubin Urine 12/20/2022 Negative  Negative Final     Ketones Urine 12/20/2022 Negative  Negative mg/dL Final     Specific Gravity Urine 12/20/2022 1.008  1.000 - 1.030 Final     Blood Urine 12/20/2022 Negative  Negative Final     pH Urine 12/20/2022 6.5  5.0 - 9.0 Final     Protein Albumin Urine 12/20/2022 Negative  Negative mg/dL Final     Urobilinogen Urine 12/20/2022 Normal  Normal, 2.0 mg/dL Final     Nitrite Urine 12/20/2022 Negative  Negative Final     Leukocyte Esterase Urine 12/20/2022 Negative  Negative Final     Bacteria Urine 12/20/2022 Few (A)  None Seen /HPF Final     Mucus Urine 12/20/2022 Present (A)  None Seen /LPF Final     RBC Urine 12/20/2022 1  <=2 /HPF Final     WBC Urine 12/20/2022 1  <=5 /HPF Final     Culture 12/20/2022 Mixed Urogenital Trisha   Final     Chlamydia Trachomatis 12/20/2022 Negative  Negative Final     Neisseria gonorrhoeae 12/20/2022 Negative  Negative Final     Trichomonas 12/20/2022 Absent  Absent Final     Yeast 12/20/2022 Absent  Absent Final     Clue Cells 12/20/2022 Absent  Absent Final     WBCs/high power field 12/20/2022 2+ (A)  None Final     HIV Antigen Antibody Combo 12/20/2022 Nonreactive  Nonreactive Final     Hepatitis C Antibody 12/20/2022 Nonreactive  Nonreactive Final     Hepatitis B Surface Antigen 12/20/2022 Nonreactive  Nonreactive Final     See Scanned Result 12/20/2022 INVITAE CARRIER SCREENING-Scanned   Final     See Scanned Result 12/20/2022 INVITAE NON-INVASIVE PRENATAL SCREENING-Scanned   Final     Rubella Jolene IgG Instrument Value 12/20/2022 2.73  <0.90 Index Final     Rubella Antibody IgG 12/20/2022 Positive   Final     Treponema Antibody Total 12/20/2022 Nonreactive  Nonreactive Final     WBC Count  12/20/2022 8.0  4.0 - 11.0 10e3/uL Final     RBC Count 12/20/2022 4.66  3.80 - 5.20 10e6/uL Final     Hemoglobin 12/20/2022 13.6  11.7 - 15.7 g/dL Final     Hematocrit 12/20/2022 39.1  35.0 - 47.0 % Final     MCV 12/20/2022 84  78 - 100 fL Final     MCH 12/20/2022 29.2  26.5 - 33.0 pg Final     MCHC 12/20/2022 34.8  31.5 - 36.5 g/dL Final     RDW 12/20/2022 13.1  10.0 - 15.0 % Final     Platelet Count 12/20/2022 240  150 - 450 10e3/uL Final     % Neutrophils 12/20/2022 66  % Final     % Lymphocytes 12/20/2022 24  % Final     % Monocytes 12/20/2022 7  % Final     % Eosinophils 12/20/2022 1  % Final     % Basophils 12/20/2022 1  % Final     % Immature Granulocytes 12/20/2022 1  % Final     NRBCs per 100 WBC 12/20/2022 0  <1 /100 Final     Absolute Neutrophils 12/20/2022 5.4  1.6 - 8.3 10e3/uL Final     Absolute Lymphocytes 12/20/2022 1.9  0.8 - 5.3 10e3/uL Final     Absolute Monocytes 12/20/2022 0.5  0.0 - 1.3 10e3/uL Final     Absolute Eosinophils 12/20/2022 0.1  0.0 - 0.7 10e3/uL Final     Absolute Basophils 12/20/2022 0.0  0.0 - 0.2 10e3/uL Final     Absolute Immature Granulocytes 12/20/2022 0.0  <=0.4 10e3/uL Final     Absolute NRBCs 12/20/2022 0.0  10e3/uL Final     ABO/RH(D) 12/20/2022 A NEG   Final     Antibody Screen 12/20/2022 Negative  Negative Final     SPECIMEN EXPIRATION DATE 12/20/2022 [id number]   Final   Office Visit on 11/27/2022   Component Date Value Ref Range Status     Trichomonas 11/27/2022 Absent  Absent Final     Yeast 11/27/2022 Absent  Absent Final     Clue Cells 11/27/2022 Present (A)  Absent Final     WBCs/high power field 11/27/2022 4+ (A)  None Final     Color Urine 11/27/2022 Yellow  Colorless, Straw, Light Yellow, Yellow Final     Appearance Urine 11/27/2022 Clear  Clear Final     Glucose Urine 11/27/2022 Negative  Negative mg/dL Final     Bilirubin Urine 11/27/2022 Negative  Negative Final     Ketones Urine 11/27/2022 Negative  Negative mg/dL Final     Specific Gravity Urine  11/27/2022 1.006  1.000 - 1.030 Final     Blood Urine 11/27/2022 Negative  Negative Final     pH Urine 11/27/2022 5.0  5.0 - 9.0 Final     Protein Albumin Urine 11/27/2022 Negative  Negative mg/dL Final     Urobilinogen Urine 11/27/2022 Normal  Normal, 2.0 mg/dL Final     Nitrite Urine 11/27/2022 Negative  Negative Final     Leukocyte Esterase Urine 11/27/2022 Negative  Negative Final     Mucus Urine 11/27/2022 Present (A)  None Seen /LPF Final     RBC Urine 11/27/2022 <1  <=2 /HPF Final     WBC Urine 11/27/2022 1  <=5 /HPF Final   Lab on 11/17/2022   Component Date Value Ref Range Status     hCG Quantitative 11/17/2022 9,140 (H)  <5 mIU/mL Final   Lab on 11/07/2022   Component Date Value Ref Range Status     hCG Quantitative 11/07/2022 223  IU/L Final   Lab on 11/03/2022   Component Date Value Ref Range Status     Progesterone 11/03/2022 11.6  ng/mL Final     hCG Quantitative 11/03/2022 17  IU/L Final   Office Visit on 11/01/2022   Component Date Value Ref Range Status     Ventricular Rate 11/01/2022 80  BPM Final     Atrial Rate 11/01/2022 80  BPM Final     VT Interval 11/01/2022 142  ms Final     QRS Duration 11/01/2022 70  ms Final     QT 11/01/2022 366  ms Final     QTc 11/01/2022 422  ms Final     P Axis 11/01/2022 60  degrees Final     R AXIS 11/01/2022 55  degrees Final     T Axis 11/01/2022 41  degrees Final     Interpretation ECG 11/01/2022    Final                    Value:Sinus rhythm  Possible Left atrial enlargement  Borderline ECG  No previous ECGs available  Confirmed by MD JONO, GABI ([id number]) on 11/1/2022 1:02:03 PM       Magnesium 11/01/2022 1.9  1.9 - 2.7 mg/dL Final     Calcium 11/01/2022 9.5  8.6 - 10.3 mg/dL Final     Sodium 11/01/2022 138  134 - 144 mmol/L Final     Potassium 11/01/2022 3.4 (L)  3.5 - 5.1 mmol/L Final     Chloride 11/01/2022 105  98 - 107 mmol/L Final     Carbon Dioxide (CO2) 11/01/2022 27  21 - 31 mmol/L Final     Anion Gap 11/01/2022 6  3 - 14 mmol/L Final     Urea  Nitrogen 2022 13  7 - 25 mg/dL Final     Creatinine 2022 0.80  0.60 - 1.20 mg/dL Final     Calcium 2022 9.5  8.6 - 10.3 mg/dL Final     Glucose 2022 63 (L)  70 - 105 mg/dL Final     GFR Estimate 2022 >90  >60 mL/min/1.73m2 Final     TSH 2022 1.40  0.40 - 4.00 mU/L Final     Hold Specimen 2022 JIC   Final     Prolactin 2022 13  3 - 27 ug/L Final   Lab on 10/20/2022   Component Date Value Ref Range Status     Chlamydia Trachomatis 10/20/2022 Negative  Negative Final     Neisseria gonorrhoeae 10/20/2022 Negative  Negative Final   Lab on 10/07/2022   Component Date Value Ref Range Status     hCG Quantitative 10/07/2022 <1  IU/L Final          ASSESSMENT:   Mya Gant presents for cardiology evaluation with increased palpitations. She is currently pregnant, , 14 weeks gestation. She has a history of CINDI. Family history positive for known PFO in maternal aunt, no other family history of cardiac disease or congenital cardiac conditions.   Today, patient describes palpitations first starting this last fall. No history of palpitations prior to this. She describes increased palpitations when laying down or sitting and resting. Typically notes symptoms in the evening. Getting up and walking around does help relieve symptoms. She describes the palpitations as rapid regular heart beat. Palpitations lasting few minutes, up to 20 minutes in duration at the longest. No other associated symptoms. No chest pain or pressure. No lightheadedness or syncope. No edema. No increased dyspnea at rest or with exertion.     1. Palpitations  2. PFO (patent foramen ovale)  3. Encounter for supervision of other normal pregnancy in first trimester    PLAN:   1. Patient with new onset palpitations, no other associated symptoms of concern. She will proceed with ZIO cardiac monitor to assess for any arrhythmia associated to symptomatic events. She is agreeable to this.   2. Reviewed recent lab  evaluation without anemia, stable thyroid function ans stable electrolytes. Slightly low potassium level on last testing. She is taking a prenatal MV.   3. Reviewed importance of keeping up with hydration, avoiding caffeine, good sleep hygiene.   4. Reviewed stable TTE findings, we did review presence of PFO- incidental finding. Seen in about 30% of the general population. No history of embolic event. There is no recommended follow-up or treatment for her PFO at this time.    Patient will follow-up with cardiology clinic in about 4-6 weeks once ZIO results received, testing results to be reviewed with patient at this follow-up visit.     Thank you for allowing me to participate in the care of your patient. Please do not hesitate to contact me if you have any questions.     Total time 69 min on date of encounter spent reviewing records, face-to-face time obtaining HPI, physical exam, reviewing results of recent testing and majority of time spent counseling on the above diagnoses and recommended plan of care.    Savanna Luque, APRN CNP [facility]

## 2023-01-06 LAB
ATRIAL RATE - MUSE: 67 BPM
DIASTOLIC BLOOD PRESSURE - MUSE: NORMAL MMHG
INTERPRETATION ECG - MUSE: NORMAL
P AXIS - MUSE: -17 DEGREES
PR INTERVAL - MUSE: 132 MS
QRS DURATION - MUSE: 74 MS
QT - MUSE: 378 MS
QTC - MUSE: 399 MS
R AXIS - MUSE: 58 DEGREES
SYSTOLIC BLOOD PRESSURE - MUSE: NORMAL MMHG
T AXIS - MUSE: 35 DEGREES
VENTRICULAR RATE- MUSE: 67 BPM

## 2023-01-09 ENCOUNTER — HOSPITAL ENCOUNTER (OUTPATIENT)
Dept: CARDIOLOGY | Facility: OTHER | Age: 23
Discharge: HOME | End: 2023-01-09
Attending: NURSE PRACTITIONER | Admitting: NURSE PRACTITIONER
Payer: COMMERCIAL

## 2023-01-09 DIAGNOSIS — Z34.81 ENCOUNTER FOR SUPERVISION OF OTHER NORMAL PREGNANCY IN FIRST TRIMESTER: ICD-10-CM

## 2023-01-09 DIAGNOSIS — Q21.12 PFO (PATENT FORAMEN OVALE): ICD-10-CM

## 2023-01-09 DIAGNOSIS — R00.2 PALPITATIONS: ICD-10-CM

## 2023-01-09 PROCEDURE — 93246 EXT ECG>7D<15D RECORDING: CPT

## 2023-01-09 PROCEDURE — 999N000157 HC STATISTIC RCP TIME EA 10 MIN

## 2023-01-09 PROCEDURE — 93248 EXT ECG>7D<15D REV&INTERPJ: CPT | Performed by: INTERNAL MEDICINE

## 2023-01-09 NOTE — PATIENT INSTRUCTIONS
Date Placed on Pt:  01/09/2022    Patient instructed not to:   -take baths, swim, sauna   -remove device prior to 14 days   -use electric blankets   -shower or sweat excessively within first 24 hours of device application  Patient instructed to:   -shower as needed   -be carefull when toweling off and dressing   -press button when cardiac symptoms occur   -document symptoms in log book   -remove and return device (send via mail to [facility])   -Call [facility] with any questions

## 2023-01-24 ENCOUNTER — PRENATAL OFFICE VISIT (OUTPATIENT)
Dept: OBGYN | Facility: OTHER | Age: 23
End: 2023-01-24
Attending: OBSTETRICS & GYNECOLOGY
Payer: COMMERCIAL

## 2023-01-24 VITALS
SYSTOLIC BLOOD PRESSURE: 120 MMHG | WEIGHT: 164 LBS | DIASTOLIC BLOOD PRESSURE: 70 MMHG | BODY MASS INDEX: 28.15 KG/M2 | HEART RATE: 77 BPM

## 2023-01-24 DIAGNOSIS — Z34.82 ENCOUNTER FOR SUPERVISION OF OTHER NORMAL PREGNANCY IN SECOND TRIMESTER: Primary | ICD-10-CM

## 2023-01-24 PROCEDURE — 99207 PR OB VISIT-NO CHARGE - GICH ONLY: CPT | Performed by: OBSTETRICS & GYNECOLOGY

## 2023-01-24 ASSESSMENT — PAIN SCALES - GENERAL: PAINLEVEL: NO PAIN (0)

## 2023-01-24 NOTE — PROGRESS NOTES
CC: Recheck OB visit at 16w6d    HPI: Mya Gant presents for a routine OB visit now at 16w6d  Concerns: Doing fine, has her cardiac monitor turned in and pending the read. She has had some issues with dyspnea when standing for long periods, resolves with rest.  Patient denies contractions, vaginal bleeding or leaking of fluid.    OB History    Para Term  AB Living   1 0 0 0 0 0   SAB IAB Ectopic Multiple Live Births   0 0 0 0 0      # Outcome Date GA Lbr Landon/2nd Weight Sex Delivery Anes PTL Lv   1 Current              Current Outpatient Medications   Medication     Omega-3 Fatty Acids (SEA-OMEGA PO)     Prenatal Vit-Fe Fumarate-FA (PRENATAL MULTIVITAMIN W/IRON) 27-0.8 MG tablet     No current facility-administered medications for this visit.     O: /70   Pulse 77   Wt 74.4 kg (164 lb)   LMP 2022 (Approximate)   BMI 28.15 kg/m    Body mass index is 28.15 kg/m .  See OB flow sheet  EXAM:  NAD    FHT:140's bpm    No results found for any visits on 23.    A/P: (Z34.82) Encounter for supervision of other normal pregnancy in second trimester  (primary encounter diagnosis)  Comment:   Plan: US OB > 14 Weeks        US next month    Recheck in 4 weeks- fetal survey    Problem List:   Pregnancy risk factors include:  Palpitations, dyspnea with exercise. Echo normal with PFO noted, Zio patch pending.    Jose Aviles MD FACOG  4:13 PM 2023

## 2023-01-24 NOTE — NURSING NOTE
"Chief Complaint   Patient presents with     Prenatal Care     16 week 6 days   Pt presents to clinic today for prenatal care 16 week 6 days. Patient is doing well with no concerns.     Initial /70   Pulse 77   Wt 74.4 kg (164 lb)   LMP 09/28/2022 (Approximate)   BMI 28.15 kg/m   Estimated body mass index is 28.15 kg/m  as calculated from the following:    Height as of 11/27/22: 1.626 m (5' 4\").    Weight as of this encounter: 74.4 kg (164 lb).  Medication Reconciliation: complete          Mariana Madrid, AWA  "

## 2023-02-03 NOTE — TELEPHONE ENCOUNTER
Patient Information     Patient Name MRN Mya Clark [id number] Female 2000      Telephone Encounter by Nicolle Moyer at 2017  9:15 AM     Author:  Nicolle Moyer Service:  (none) Author Type:  (none)     Filed:  2017  9:18 AM Encounter Date:  2017 Status:  Signed     :  Nicolle Moyer            Pt is due for next Depo on . Notified and will make appt.  Nicolle Moyer LPN ....................  2017   9:18 AM           4 = No assist / stand by assistance

## 2023-02-08 ENCOUNTER — MYC MEDICAL ADVICE (OUTPATIENT)
Dept: CARDIOLOGY | Facility: OTHER | Age: 23
End: 2023-02-08
Payer: COMMERCIAL

## 2023-02-20 ENCOUNTER — MYC MEDICAL ADVICE (OUTPATIENT)
Dept: OBGYN | Facility: OTHER | Age: 23
End: 2023-02-20
Payer: COMMERCIAL

## 2023-02-21 ENCOUNTER — APPOINTMENT (OUTPATIENT)
Dept: ULTRASOUND IMAGING | Facility: OTHER | Age: 23
End: 2023-02-21
Attending: EMERGENCY MEDICINE
Payer: COMMERCIAL

## 2023-02-21 ENCOUNTER — HOSPITAL ENCOUNTER (EMERGENCY)
Facility: OTHER | Age: 23
Discharge: HOME | End: 2023-02-21
Attending: EMERGENCY MEDICINE | Admitting: EMERGENCY MEDICINE
Payer: COMMERCIAL

## 2023-02-21 ENCOUNTER — MYC MEDICAL ADVICE (OUTPATIENT)
Dept: OBGYN | Facility: OTHER | Age: 23
End: 2023-02-21

## 2023-02-21 VITALS
SYSTOLIC BLOOD PRESSURE: 138 MMHG | HEIGHT: 65 IN | HEART RATE: 77 BPM | DIASTOLIC BLOOD PRESSURE: 71 MMHG | RESPIRATION RATE: 16 BRPM | TEMPERATURE: 97.7 F | BODY MASS INDEX: 26.66 KG/M2 | WEIGHT: 160 LBS | OXYGEN SATURATION: 98 %

## 2023-02-21 DIAGNOSIS — Z3A.20 20 WEEKS GESTATION OF PREGNANCY: ICD-10-CM

## 2023-02-21 LAB
ABO/RH(D): NORMAL
ALBUMIN UR-MCNC: NEGATIVE MG/DL
ANION GAP SERPL CALCULATED.3IONS-SCNC: 11 MMOL/L (ref 7–15)
ANTIBODY SCREEN: NEGATIVE
APPEARANCE UR: CLEAR
BACTERIA #/AREA URNS HPF: ABNORMAL /HPF
BASOPHILS # BLD AUTO: 0 10E3/UL (ref 0–0.2)
BASOPHILS NFR BLD AUTO: 0 %
BILIRUB UR QL STRIP: NEGATIVE
BUN SERPL-MCNC: 11.5 MG/DL (ref 6–20)
CALCIUM SERPL-MCNC: 8.8 MG/DL (ref 8.6–10)
CHLORIDE SERPL-SCNC: 103 MMOL/L (ref 98–107)
COLOR UR AUTO: YELLOW
CREAT SERPL-MCNC: 0.55 MG/DL (ref 0.51–0.95)
DEPRECATED HCO3 PLAS-SCNC: 21 MMOL/L (ref 22–29)
EOSINOPHIL # BLD AUTO: 0.1 10E3/UL (ref 0–0.7)
EOSINOPHIL NFR BLD AUTO: 1 %
ERYTHROCYTE [DISTWIDTH] IN BLOOD BY AUTOMATED COUNT: 13.2 % (ref 10–15)
GFR SERPL CREATININE-BSD FRML MDRD: >90 ML/MIN/1.73M2
GLUCOSE SERPL-MCNC: 81 MG/DL (ref 70–99)
GLUCOSE UR STRIP-MCNC: NEGATIVE MG/DL
HCT VFR BLD AUTO: 35.5 % (ref 35–47)
HGB BLD-MCNC: 12.3 G/DL (ref 11.7–15.7)
HGB UR QL STRIP: NEGATIVE
HOLD SPECIMEN: NORMAL
IMM GRANULOCYTES # BLD: 0.1 10E3/UL
IMM GRANULOCYTES NFR BLD: 1 %
KETONES UR STRIP-MCNC: NEGATIVE MG/DL
LEUKOCYTE ESTERASE UR QL STRIP: NEGATIVE
LYMPHOCYTES # BLD AUTO: 2.3 10E3/UL (ref 0.8–5.3)
LYMPHOCYTES NFR BLD AUTO: 23 %
MCH RBC QN AUTO: 29.1 PG (ref 26.5–33)
MCHC RBC AUTO-ENTMCNC: 34.6 G/DL (ref 31.5–36.5)
MCV RBC AUTO: 84 FL (ref 78–100)
MONOCYTES # BLD AUTO: 0.8 10E3/UL (ref 0–1.3)
MONOCYTES NFR BLD AUTO: 8 %
MUCOUS THREADS #/AREA URNS LPF: PRESENT /LPF
NEUTROPHILS # BLD AUTO: 6.7 10E3/UL (ref 1.6–8.3)
NEUTROPHILS NFR BLD AUTO: 67 %
NITRATE UR QL: NEGATIVE
NRBC # BLD AUTO: 0 10E3/UL
NRBC BLD AUTO-RTO: 0 /100
PH UR STRIP: 6.5 [PH] (ref 5–9)
PLATELET # BLD AUTO: 225 10E3/UL (ref 150–450)
POTASSIUM SERPL-SCNC: 3.6 MMOL/L (ref 3.4–5.3)
RBC # BLD AUTO: 4.23 10E6/UL (ref 3.8–5.2)
RBC URINE: <1 /HPF
SODIUM SERPL-SCNC: 135 MMOL/L (ref 136–145)
SP GR UR STRIP: 1.01 (ref 1–1.03)
SPECIMEN EXPIRATION DATE: NORMAL
UROBILINOGEN UR STRIP-MCNC: NORMAL MG/DL
WBC # BLD AUTO: 10.1 10E3/UL (ref 4–11)
WBC URINE: <1 /HPF

## 2023-02-21 PROCEDURE — 85025 COMPLETE CBC W/AUTO DIFF WBC: CPT | Performed by: EMERGENCY MEDICINE

## 2023-02-21 PROCEDURE — 99283 EMERGENCY DEPT VISIT LOW MDM: CPT | Performed by: EMERGENCY MEDICINE

## 2023-02-21 PROCEDURE — 81001 URINALYSIS AUTO W/SCOPE: CPT | Performed by: EMERGENCY MEDICINE

## 2023-02-21 PROCEDURE — 99285 EMERGENCY DEPT VISIT HI MDM: CPT | Mod: 25 | Performed by: EMERGENCY MEDICINE

## 2023-02-21 PROCEDURE — 80048 BASIC METABOLIC PNL TOTAL CA: CPT | Performed by: EMERGENCY MEDICINE

## 2023-02-21 PROCEDURE — 36415 COLL VENOUS BLD VENIPUNCTURE: CPT | Performed by: EMERGENCY MEDICINE

## 2023-02-21 PROCEDURE — 76805 OB US >/= 14 WKS SNGL FETUS: CPT

## 2023-02-21 PROCEDURE — 86901 BLOOD TYPING SEROLOGIC RH(D): CPT | Performed by: EMERGENCY MEDICINE

## 2023-02-21 NOTE — ED TRIAGE NOTES
Patient comes to ER from home with c/o not feeling any baby movement in the past 24 hours. Is 20 weeks pregnant. /71.

## 2023-02-22 NOTE — ED PROVIDER NOTES
"[facility]  Emergency Department Visit Note    Pregnancy Complications      History of Present Illness     HPI:  Mya Gant is a 22 year old female  at 19w6d gestation presenting with no fetal moment for 24 hours. There is no associated abdominal pain, vaginal bleeding or fluid . There is no nausea, vomiting,  dysuria, light headedness. She has attended prenatal obstetric care appointments. The patient does not know of any complications with the pregnancy thus far. No trauma    Medications:  Prior to Admission medications    Medication Sig Last Dose Taking? Auth Provider Long Term End Date   Omega-3 Fatty Acids (SEA-OMEGA PO)    Reported, Patient     Prenatal Vit-Fe Fumarate-FA (PRENATAL MULTIVITAMIN W/IRON) 27-0.8 MG tablet Take 1 tablet by mouth daily   Stephie Rosas MD         Allergies:  No Known Allergies    Problem List:  Patient Active Problem List   Diagnosis     Anxiety state       Past Medical History:  Past Medical History:   Diagnosis Date     Anxiety disorder      Jaundice      Migraines      Pneumonia     History of left lower lobe pneumonia times two.     Varicella        Past Surgical History:  Past Surgical History:   Procedure Laterality Date     NO HISTORY OF SURGERY         Social History:  Social History     Tobacco Use     Smoking status: Never     Smokeless tobacco: Never   Vaping Use     Vaping Use: Never used   Substance Use Topics     Alcohol use: Not Currently     Comment: occasional     Drug use: Never       Review of Systems:  Complete review of systems obtained and pertinent positive and negative findings noted in HPI. Review of systems otherwise negative.      Physical Exam     Vital signs: /71   Pulse 77   Temp 97.7  F (36.5  C) (Tympanic)   Resp 16   Ht 1.651 m (5' 5\")   Wt 72.6 kg (160 lb)   LMP 2022 (Approximate)   SpO2 98%   BMI 26.63 kg/m      Physical Exam:    General: awake and alert, comfortable  HEENT: atraumatic, no scleral " injection, no nasal discharge, neck supple  Chest: clear to auscultation bilaterally without wheezes or crackles, non labored respirations  Cardiovascular: regular rate and rhythm, no murmurs or gallops  Abdomen: soft, gravid, nontender, no rebound or guarding. US reveals FHT 150s, fetal movmement  Extremities: no deformities, edema, or tenderness  Skin: warm, dry, no rashes  Neuro: alert and oriented x 3, moving extremities x 4, ambulates without difficulty        Medical Decision Making & ED Course     Mya Gant is a 22 year old female presenting with concerns of no fetal movment in second trimester of pregnancy. Bedside US reassuring. Differential includes threatened , spontaneous , missed , ectopic pregnancy, placenta previa, placental abruption, cervical incompetence.  She should return to the emergency department for bleeding,  pain, fever, lightheadedness/syncope, or any other concerns.  All questions were answered and the patient is comfortable with plan for discharge. The patient was discharged in stable condition.    ED Course as of 23 Formal US shows FHTs 150s with good fetal movment       I have reviewed the patients laboratory studies, imaging, and medical records.      Diagnosis and Disposition     Diagnosis:  Second trimester pregnancy    Disposition:  Home    MD Alexis Peraza Theresa M, MD  23       Elizabeth Espinoza MD  23

## 2023-02-24 ENCOUNTER — HOSPITAL ENCOUNTER (OUTPATIENT)
Dept: ULTRASOUND IMAGING | Facility: OTHER | Age: 23
Discharge: HOME | End: 2023-02-24
Attending: OBSTETRICS & GYNECOLOGY
Payer: COMMERCIAL

## 2023-02-24 ENCOUNTER — PRENATAL OFFICE VISIT (OUTPATIENT)
Dept: OBGYN | Facility: OTHER | Age: 23
End: 2023-02-24
Attending: OBSTETRICS & GYNECOLOGY
Payer: COMMERCIAL

## 2023-02-24 VITALS
DIASTOLIC BLOOD PRESSURE: 80 MMHG | HEART RATE: 103 BPM | SYSTOLIC BLOOD PRESSURE: 132 MMHG | WEIGHT: 170.9 LBS | BODY MASS INDEX: 28.44 KG/M2 | OXYGEN SATURATION: 99 %

## 2023-02-24 DIAGNOSIS — Z34.82 NORMAL PREGNANCY IN MULTIGRAVIDA IN SECOND TRIMESTER: Primary | ICD-10-CM

## 2023-02-24 DIAGNOSIS — Z34.82 ENCOUNTER FOR SUPERVISION OF OTHER NORMAL PREGNANCY IN SECOND TRIMESTER: ICD-10-CM

## 2023-02-24 DIAGNOSIS — Z20.822 EXPOSURE TO 2019 NOVEL CORONAVIRUS: ICD-10-CM

## 2023-02-24 PROCEDURE — 76805 OB US >/= 14 WKS SNGL FETUS: CPT

## 2023-02-24 PROCEDURE — 99207 PR OB VISIT-NO CHARGE - GICH ONLY: CPT | Performed by: OBSTETRICS & GYNECOLOGY

## 2023-02-24 ASSESSMENT — PAIN SCALES - GENERAL: PAINLEVEL: NO PAIN (0)

## 2023-02-24 NOTE — PROGRESS NOTES
CC: Recheck OB visit at 20w0d    HPI: Mya Gant presents for a routine OB visit now at 20w0d  Concerns: No, some decreased movement previously, doing OK  Patient notices normal fetal movement, denies contractions, vaginal bleeding or leaking of fluid.    OB History    Para Term  AB Living   1 0 0 0 0 0   SAB IAB Ectopic Multiple Live Births   0 0 0 0 0      # Outcome Date GA Lbr Landon/2nd Weight Sex Delivery Anes PTL Lv   1 Current              Current Outpatient Medications   Medication     Omega-3 Fatty Acids (SEA-OMEGA PO)     Prenatal Vit-Fe Fumarate-FA (PRENATAL MULTIVITAMIN W/IRON) 27-0.8 MG tablet     No current facility-administered medications for this visit.     O: /80   Pulse 103   Wt 77.5 kg (170 lb 14.4 oz)   LMP 2022 (Approximate)   SpO2 99%   BMI 28.44 kg/m    Body mass index is 28.44 kg/m .  See OB flow sheet  EXAM:  NAD    Recent Results (from the past 24 hour(s))   US OB > 14 Weeks    Narrative    OB ULTRASOUND - Transabdominal     Clinical: Encounter for supervision of other normal pregnancy in  second trimester.   Gestation: 1  Comparison: 2023  Presentation: Cephalic  Cardiac Activity: Regular at 150 bpm  Movement: Yes  Placenta: Anterior ndGndrndanddndend:nd nd2nd Previa: No Previa  Cervix: 3.7 cm in length  Amniotic Fluid: Normal MVP: 4.3 cm    Measurements ([place]):  BPD: 19%  HC: 14%  AC: 22%  FL: 66%    Estimated Fetal Weight: 316 grams  HC/AC: 1.20  US age (current): 19 weeks 5 days  Gestational age: 20 weeks 0 days  US EDC (preferred):  /   %WT for EGA (preferred dating): 36 %  Cord PI 1.34-1.65 (95% 1.61)    Structural Survey:  Head:  Unremarkable  Face: Unremarkable  Spine:  Unremarkable  Stomach:  Unremarkable  Kidneys:  Unremarkable  Bladder:  Unremarkable  3 Vessel Cord:  Unremarkable  Cord Insertion:  Unremarkable  4CH, LVOT, RVOT:  Unremarkable  Ant. Abd Wall:  Unremarkable  Diaphragm:  Unremarkable  Situs: Unremarkable      Impression    IMPRESSION:  Single viable intrauterine pregnancy demonstrating  appropriate interval growth.     TOSIN WOMACK MD         SYSTEM ID:  [id number]       No results found for any visits on 02/24/23.    A/P: 20w0d gestation    Recheck in 4 weeks    Problem List:   Pregnancy risk factors include:  Palpitations, dyspnea with exercise.  Echo normal with PFO noted.  Zio patch pending.     Jose Aviles MD FACOG  9:57 AM 2/24/2023

## 2023-02-24 NOTE — NURSING NOTE
Patient presents to clinic for OB visit, patient is 21 weeks and 2 days  No concerns present at this time  /80   Pulse 103   Wt 77.5 kg (170 lb 14.4 oz)   LMP 09/28/2022 (Approximate)   SpO2 99%   BMI 28.44 kg/m    Madelin Mendez LPN on 2/24/2023 at 9:34 AM

## 2023-03-07 ENCOUNTER — TELEPHONE (OUTPATIENT)
Dept: OBGYN | Facility: OTHER | Age: 23
End: 2023-03-07
Payer: COMMERCIAL

## 2023-03-07 NOTE — TELEPHONE ENCOUNTER
Patient call, verification of name and . Patient shares that all 12 of her  children experienced COVID-19 in the past week. Patient tested herself over the weekend with the onset of runny nose and slight cough. Negative COVID test, she has not retested. Denies fever, breathing concerns. Advised for patient to retest within 48 hours of last test for reassurance. Education discussed regarding home care measures and quarantine in COVID-19 diagnosis of pregnancy, including emergent symptoms. Patient also has concerns of excessive unknown secretions (urine/sweat/discharge) with exercise. Patient states this occurred x1 with recent cardio routine where her underwear were soaked. Once changed, this did not reoccur. Denies continued leaking of fluid or vaginal discharge/bleeding. Discussion of sweating increase possibility in pregnancy. Advised for patient to seek evaluation if she notices leaking of fluid is continuing, colorless, odorless. Patient verbalized understanding, no further questions or concerns at this time. Harriet Keys RN ....................  3/7/2023   1:38 PM

## 2023-03-07 NOTE — TELEPHONE ENCOUNTER
Mya left a message that she would like Dr. Aviles to call her about some questions that she has for him.  Jodee Mendoza on 3/7/2023 at 12:35 PM

## 2023-03-27 ENCOUNTER — PRENATAL OFFICE VISIT (OUTPATIENT)
Dept: OBGYN | Facility: OTHER | Age: 23
End: 2023-03-27
Attending: OBSTETRICS & GYNECOLOGY
Payer: COMMERCIAL

## 2023-03-27 VITALS
WEIGHT: 183 LBS | DIASTOLIC BLOOD PRESSURE: 70 MMHG | BODY MASS INDEX: 30.45 KG/M2 | HEART RATE: 97 BPM | SYSTOLIC BLOOD PRESSURE: 132 MMHG

## 2023-03-27 DIAGNOSIS — Z34.82 NORMAL PREGNANCY IN MULTIGRAVIDA IN SECOND TRIMESTER: Primary | ICD-10-CM

## 2023-03-27 DIAGNOSIS — O26.842 SIGNIFICANT DISCREPANCY BETWEEN UTERINE SIZE AND CLINICAL DATES, ANTEPARTUM, SECOND TRIMESTER: ICD-10-CM

## 2023-03-27 DIAGNOSIS — N89.8 VAGINAL DISCHARGE: ICD-10-CM

## 2023-03-27 LAB
BASOPHILS # BLD AUTO: 0.1 10E3/UL (ref 0–0.2)
BASOPHILS NFR BLD AUTO: 1 %
CLUE CELLS: PRESENT
EOSINOPHIL # BLD AUTO: 0.2 10E3/UL (ref 0–0.7)
EOSINOPHIL NFR BLD AUTO: 2 %
ERYTHROCYTE [DISTWIDTH] IN BLOOD BY AUTOMATED COUNT: 13.2 % (ref 10–15)
GLUCOSE 1H P 50 G GLC PO SERPL-MCNC: 104 MG/DL (ref 70–129)
HCT VFR BLD AUTO: 35.8 % (ref 35–47)
HGB BLD-MCNC: 12.5 G/DL (ref 11.7–15.7)
IMM GRANULOCYTES # BLD: 0.2 10E3/UL
IMM GRANULOCYTES NFR BLD: 2 %
LYMPHOCYTES # BLD AUTO: 2.1 10E3/UL (ref 0.8–5.3)
LYMPHOCYTES NFR BLD AUTO: 19 %
MCH RBC QN AUTO: 29.8 PG (ref 26.5–33)
MCHC RBC AUTO-ENTMCNC: 34.9 G/DL (ref 31.5–36.5)
MCV RBC AUTO: 85 FL (ref 78–100)
MONOCYTES # BLD AUTO: 0.8 10E3/UL (ref 0–1.3)
MONOCYTES NFR BLD AUTO: 7 %
NEUTROPHILS # BLD AUTO: 7.7 10E3/UL (ref 1.6–8.3)
NEUTROPHILS NFR BLD AUTO: 69 %
NRBC # BLD AUTO: 0 10E3/UL
NRBC BLD AUTO-RTO: 0 /100
PLATELET # BLD AUTO: 225 10E3/UL (ref 150–450)
RBC # BLD AUTO: 4.2 10E6/UL (ref 3.8–5.2)
TRICHOMONAS, WET PREP: ABNORMAL
WBC # BLD AUTO: 11 10E3/UL (ref 4–11)
WBC'S/HIGH POWER FIELD, WET PREP: ABNORMAL
YEAST, WET PREP: PRESENT

## 2023-03-27 PROCEDURE — 87210 SMEAR WET MOUNT SALINE/INK: CPT | Mod: ZL | Performed by: OBSTETRICS & GYNECOLOGY

## 2023-03-27 PROCEDURE — 99207 PR OB VISIT-NO CHARGE - GICH ONLY: CPT | Performed by: OBSTETRICS & GYNECOLOGY

## 2023-03-27 PROCEDURE — 36415 COLL VENOUS BLD VENIPUNCTURE: CPT | Mod: ZL | Performed by: OBSTETRICS & GYNECOLOGY

## 2023-03-27 PROCEDURE — 86780 TREPONEMA PALLIDUM: CPT | Mod: ZL | Performed by: OBSTETRICS & GYNECOLOGY

## 2023-03-27 PROCEDURE — 82950 GLUCOSE TEST: CPT | Mod: ZL | Performed by: OBSTETRICS & GYNECOLOGY

## 2023-03-27 PROCEDURE — 85025 COMPLETE CBC W/AUTO DIFF WBC: CPT | Mod: ZL | Performed by: OBSTETRICS & GYNECOLOGY

## 2023-03-27 RX ORDER — FLUCONAZOLE 150 MG/1
150 TABLET ORAL ONCE
Qty: 1 TABLET | Refills: 0 | Status: SHIPPED | OUTPATIENT
Start: 2023-03-27 | End: 2023-03-27

## 2023-03-27 RX ORDER — METRONIDAZOLE 7.5 MG/G
1 GEL VAGINAL DAILY
Qty: 35 G | Refills: 0 | Status: SHIPPED | OUTPATIENT
Start: 2023-03-27 | End: 2023-04-03

## 2023-03-27 ASSESSMENT — PAIN SCALES - GENERAL: PAINLEVEL: NO PAIN (0)

## 2023-03-27 NOTE — NURSING NOTE
"Chief Complaint   Patient presents with     Prenatal Care     24 week 3 days   Pt presents to clinic today for prenatal care 24 week 3 days.    Initial /70   Pulse 97   Wt 83 kg (183 lb)   LMP 09/28/2022 (Approximate)   BMI 30.45 kg/m   Estimated body mass index is 30.45 kg/m  as calculated from the following:    Height as of 2/21/23: 1.651 m (5' 5\").    Weight as of this encounter: 83 kg (183 lb).  Medication Reconciliation: complete          Mariana Madrid LPN  "

## 2023-03-29 LAB — T PALLIDUM AB SER QL: NONREACTIVE

## 2023-04-24 ENCOUNTER — PRENATAL OFFICE VISIT (OUTPATIENT)
Dept: OBGYN | Facility: OTHER | Age: 23
End: 2023-04-24
Attending: OBSTETRICS & GYNECOLOGY
Payer: COMMERCIAL

## 2023-04-24 VITALS
DIASTOLIC BLOOD PRESSURE: 80 MMHG | HEART RATE: 87 BPM | RESPIRATION RATE: 16 BRPM | WEIGHT: 193.5 LBS | SYSTOLIC BLOOD PRESSURE: 124 MMHG | OXYGEN SATURATION: 98 % | BODY MASS INDEX: 32.2 KG/M2

## 2023-04-24 DIAGNOSIS — B96.89 BV (BACTERIAL VAGINOSIS): ICD-10-CM

## 2023-04-24 DIAGNOSIS — N89.8 VAGINAL DISCHARGE: Primary | ICD-10-CM

## 2023-04-24 DIAGNOSIS — Z67.91 RH NEGATIVE STATE IN ANTEPARTUM PERIOD, THIRD TRIMESTER: Primary | ICD-10-CM

## 2023-04-24 DIAGNOSIS — Z34.82 NORMAL PREGNANCY IN MULTIGRAVIDA IN SECOND TRIMESTER: ICD-10-CM

## 2023-04-24 DIAGNOSIS — O26.893 RH NEGATIVE STATE IN ANTEPARTUM PERIOD, THIRD TRIMESTER: Primary | ICD-10-CM

## 2023-04-24 DIAGNOSIS — N76.0 BV (BACTERIAL VAGINOSIS): ICD-10-CM

## 2023-04-24 LAB
CLUE CELLS: PRESENT
TRICHOMONAS, WET PREP: ABNORMAL
WBC'S/HIGH POWER FIELD, WET PREP: ABNORMAL
YEAST, WET PREP: ABNORMAL

## 2023-04-24 PROCEDURE — 87210 SMEAR WET MOUNT SALINE/INK: CPT | Mod: ZL | Performed by: OBSTETRICS & GYNECOLOGY

## 2023-04-24 PROCEDURE — 99207 PR OB VISIT-NO CHARGE - GICH ONLY: CPT | Performed by: OBSTETRICS & GYNECOLOGY

## 2023-04-24 RX ORDER — METRONIDAZOLE 7.5 MG/G
1 GEL VAGINAL DAILY
Qty: 35 G | Refills: 0 | Status: SHIPPED | OUTPATIENT
Start: 2023-04-24 | End: 2023-05-01

## 2023-04-24 ASSESSMENT — PAIN SCALES - GENERAL: PAINLEVEL: NO PAIN (0)

## 2023-04-24 NOTE — NURSING NOTE
"Chief Complaint   Patient presents with     Prenatal Care     28.3 weeks       Initial /80   Pulse 87   Resp 16   Wt 87.8 kg (193 lb 8 oz)   LMP 09/28/2022 (Approximate)   SpO2 98%   Breastfeeding No   BMI 32.20 kg/m   Estimated body mass index is 32.2 kg/m  as calculated from the following:    Height as of 2/21/23: 1.651 m (5' 5\").    Weight as of this encounter: 87.8 kg (193 lb 8 oz).  Medication Reconciliation: complete    FOOD SECURITY SCREENING QUESTIONS  Hunger Vital Signs:  Within the past 12 months we worried whether our food would run out before we got money to buy more. Never  Within the past 12 months the food we bought just didn't last and we didn't have money to get more. Never        Advance care directive on file? no  Advance care directive provided to patient? declined     Ryann Hernandez LPN  "

## 2023-04-24 NOTE — PROGRESS NOTES
CC: Recheck OB visit at 28w3d    HPI: Mya Gant presents for a routine OB visit now at 28w3d  Concerns:   Patient notices normal fetal movement, denies contractions, vaginal bleeding or leaking of fluid.    OB History    Para Term  AB Living   1 0 0 0 0 0   SAB IAB Ectopic Multiple Live Births   0 0 0 0 0      # Outcome Date GA Lbr Landon/2nd Weight Sex Delivery Anes PTL Lv   1 Current              Current Outpatient Medications   Medication     Omega-3 Fatty Acids (SEA-OMEGA PO)     Prenatal Vit-Fe Fumarate-FA (PRENATAL MULTIVITAMIN W/IRON) 27-0.8 MG tablet     Current Facility-Administered Medications   Medication     rho(D) immune globulin (RHOPHYLAC) injection 300 mcg     O: /80   Pulse 87   Resp 16   Wt 87.8 kg (193 lb 8 oz)   LMP 2022 (Approximate)   SpO2 98%   Breastfeeding No   BMI 32.20 kg/m    Body mass index is 32.2 kg/m .  See OB flow sheet  EXAM:  NAD  FH:28 cm  FHT: 145 bpm  Wet prep collected    No results found for any visits on 23.    A/P: 28w3d  (N89.8) Vaginal discharge  (primary encounter diagnosis)  Comment:   Plan: Wet Prep, Genital            (Z34.82) Normal pregnancy in multigravida in second trimester  Comment:   Plan: DISCONTINUED: rho(D) immune globulin         (RHOPHYLAC) injection 300 mcg, CANCELED: TDAP         VACCINE (Adacel, Boostrix)  [[id number]]          Patient declined TDAP and Rhogam today. I strongly encouraged her to reconsider. She is going to think it over and [facility] will allow for Rhogam administration. I advised her that not doing the Rhogam could have dire consequences for future pregnancy such as hemolytic disease of the , fetal death and increased miscarriage risk.        Recheck in 2-4 weeks    Problem List:   Pregnancy risk factors include:  Palpitations, dyspnea with exercise.  Echo normal with PFO noted.  Declining TDAP, currently declines Rhogam as of 2023      Jose Aviles MD FACOG  3:57  PM 4/24/2023

## 2023-04-28 ENCOUNTER — ALLIED HEALTH/NURSE VISIT (OUTPATIENT)
Dept: OBGYN | Facility: OTHER | Age: 23
End: 2023-04-28
Attending: OBSTETRICS & GYNECOLOGY
Payer: COMMERCIAL

## 2023-04-28 ENCOUNTER — TRANSFERRED RECORDS (OUTPATIENT)
Dept: HEALTH INFORMATION MANAGEMENT | Facility: OTHER | Age: 23
End: 2023-04-28

## 2023-04-28 DIAGNOSIS — Z34.93 SUPERVISION OF NORMAL PREGNANCY IN THIRD TRIMESTER: ICD-10-CM

## 2023-04-28 DIAGNOSIS — Z34.82 NORMAL PREGNANCY IN MULTIGRAVIDA IN SECOND TRIMESTER: Primary | ICD-10-CM

## 2023-04-28 PROCEDURE — 90471 IMMUNIZATION ADMIN: CPT

## 2023-04-28 PROCEDURE — 90715 TDAP VACCINE 7 YRS/> IM: CPT

## 2023-04-28 PROCEDURE — 250N000011 HC RX IP 250 OP 636: Performed by: OBSTETRICS & GYNECOLOGY

## 2023-04-28 PROCEDURE — 96372 THER/PROPH/DIAG INJ SC/IM: CPT | Performed by: OBSTETRICS & GYNECOLOGY

## 2023-04-28 RX ADMIN — HUMAN RHO(D) IMMUNE GLOBULIN 300 MCG: 1500 SOLUTION INTRAMUSCULAR; INTRAVENOUS at 08:41

## 2023-04-28 NOTE — NURSING NOTE
Patient presents to clinic for administration of Tdap and Rhogam.    Administrations This Visit     rho(D) immune globulin (RHOPHYLAC) injection 300 mcg     Admin Date  04/28/2023 Action  $Given Dose  300 mcg Route  Intramuscular Site  Right Ventrogluteal Administered By  Iris Ford, RN    Ordering Provider: Jose Aviles MD    Patient Supplied?: No              Immunization Documentation    Prior to Immunization administration, verified patients identity using patient's name and date of birth. Please see IMMUNIZATIONS  and order for additional information.  Patient / Parent instructed to remain in clinic for 15 minutes and report any adverse reaction to staff immediately.    Was entire vial of medication used? Yes  Vial/Syringe: Single dose vial    Patient tolerated well.    Iris Ford RN  4/28/2023   8:44 AM

## 2023-05-12 ENCOUNTER — OFFICE VISIT (OUTPATIENT)
Dept: OBGYN | Facility: OTHER | Age: 23
End: 2023-05-12
Payer: COMMERCIAL

## 2023-05-12 VITALS
SYSTOLIC BLOOD PRESSURE: 122 MMHG | WEIGHT: 198.7 LBS | HEART RATE: 94 BPM | DIASTOLIC BLOOD PRESSURE: 70 MMHG | BODY MASS INDEX: 33.07 KG/M2

## 2023-05-12 DIAGNOSIS — B96.89 BACTERIAL VAGINOSIS: ICD-10-CM

## 2023-05-12 DIAGNOSIS — N76.0 BACTERIAL VAGINOSIS: ICD-10-CM

## 2023-05-12 DIAGNOSIS — N89.8 VAGINAL DISCHARGE: Primary | ICD-10-CM

## 2023-05-12 PROCEDURE — G0463 HOSPITAL OUTPT CLINIC VISIT: HCPCS

## 2023-05-12 PROCEDURE — 99213 OFFICE O/P EST LOW 20 MIN: CPT

## 2023-05-12 PROCEDURE — 87210 SMEAR WET MOUNT SALINE/INK: CPT | Mod: ZL

## 2023-05-12 RX ORDER — METRONIDAZOLE 500 MG/1
500 TABLET ORAL 2 TIMES DAILY
Qty: 14 TABLET | Refills: 0 | Status: SHIPPED | OUTPATIENT
Start: 2023-05-12 | End: 2023-05-19

## 2023-05-12 ASSESSMENT — PATIENT HEALTH QUESTIONNAIRE - PHQ9
SUM OF ALL RESPONSES TO PHQ QUESTIONS 1-9: 0
10. IF YOU CHECKED OFF ANY PROBLEMS, HOW DIFFICULT HAVE THESE PROBLEMS MADE IT FOR YOU TO DO YOUR WORK, TAKE CARE OF THINGS AT HOME, OR GET ALONG WITH OTHER PEOPLE: NOT DIFFICULT AT ALL
SUM OF ALL RESPONSES TO PHQ QUESTIONS 1-9: 0

## 2023-05-12 NOTE — PROGRESS NOTES
F/U Visit    S: Patient is feeling okay but notes vaginal irritation and bothersome discharge. Denies LOF, VB, or regular Ctx. +FM.     O: /70   Pulse 94   Wt 90.1 kg (198 lb 11.2 oz)   LMP 2022 (Approximate)   BMI 33.07 kg/m    Gen: Well-appearing, NAD  FH: 31  FHT: 135  Pelvic: Normal appearing external female genitalia but erythematous. Normal hair distribution. Vagina is without lesions.  Moderate white discharge + whiff test. Cervix normal.       A/P:  Mya aGnt is a 22 year old  at 31w0d by 10W3D US, here for return due to vaginal discharge and wet prep is collected today consistent with BV will treat with flagyl today. Discussed return precautions with Suresh Alegria. She is in agreement with this.       Luzma BRADY, TREYP-C  2023 11:35 AM

## 2023-05-12 NOTE — NURSING NOTE
Pt presents to clinic today for prenatal care 31w0d. Pt denies any  bleeding, or leakage of fluid at this time. States baby is moving good and has noticed  Doole walsh.    Medication Reconciliation: complete  Alexandra Mendez LPN

## 2023-05-12 NOTE — ADDENDUM NOTE
Addended by: JOAQUÍN WATKINS on: 5/12/2023 11:48 AM     Modules accepted: Orders, Level of Service

## 2023-05-23 ENCOUNTER — HOSPITAL ENCOUNTER (OUTPATIENT)
Facility: OTHER | Age: 23
Discharge: HOME | End: 2023-05-23
Attending: OBSTETRICS & GYNECOLOGY | Admitting: OBSTETRICS & GYNECOLOGY
Payer: COMMERCIAL

## 2023-05-23 VITALS
DIASTOLIC BLOOD PRESSURE: 77 MMHG | SYSTOLIC BLOOD PRESSURE: 112 MMHG | HEART RATE: 117 BPM | RESPIRATION RATE: 18 BRPM | BODY MASS INDEX: 35.17 KG/M2 | TEMPERATURE: 97.2 F | HEIGHT: 64 IN | OXYGEN SATURATION: 93 % | WEIGHT: 206 LBS

## 2023-05-23 DIAGNOSIS — Z36.89 ENCOUNTER FOR TRIAGE IN PREGNANT PATIENT: ICD-10-CM

## 2023-05-23 LAB
ALBUMIN MFR UR ELPH: <4 MG/DL (ref 1–14)
ALBUMIN SERPL BCG-MCNC: 3.3 G/DL (ref 3.5–5.2)
ALBUMIN UR-MCNC: NEGATIVE MG/DL
ALP SERPL-CCNC: 99 U/L (ref 35–104)
ALT SERPL W P-5'-P-CCNC: 23 U/L (ref 10–35)
ANION GAP SERPL CALCULATED.3IONS-SCNC: 12 MMOL/L (ref 7–15)
APPEARANCE UR: CLEAR
AST SERPL W P-5'-P-CCNC: 21 U/L (ref 10–35)
BASOPHILS # BLD AUTO: 0.1 10E3/UL (ref 0–0.2)
BASOPHILS NFR BLD AUTO: 1 %
BILIRUB SERPL-MCNC: <0.2 MG/DL
BILIRUB UR QL STRIP: NEGATIVE
BUN SERPL-MCNC: 8 MG/DL (ref 6–20)
CALCIUM SERPL-MCNC: 9 MG/DL (ref 8.6–10)
CHLORIDE SERPL-SCNC: 103 MMOL/L (ref 98–107)
CLUE CELLS: ABNORMAL
COLOR UR AUTO: YELLOW
CREAT SERPL-MCNC: 0.63 MG/DL (ref 0.51–0.95)
CREAT UR-MCNC: 23.2 MG/DL
DEPRECATED HCO3 PLAS-SCNC: 20 MMOL/L (ref 22–29)
EOSINOPHIL # BLD AUTO: 0.2 10E3/UL (ref 0–0.7)
EOSINOPHIL NFR BLD AUTO: 2 %
ERYTHROCYTE [DISTWIDTH] IN BLOOD BY AUTOMATED COUNT: 13.1 % (ref 10–15)
GFR SERPL CREATININE-BSD FRML MDRD: >90 ML/MIN/1.73M2
GLUCOSE SERPL-MCNC: 116 MG/DL (ref 70–99)
GLUCOSE UR STRIP-MCNC: NEGATIVE MG/DL
HCT VFR BLD AUTO: 34.8 % (ref 35–47)
HGB BLD-MCNC: 12.1 G/DL (ref 11.7–15.7)
HGB UR QL STRIP: NEGATIVE
HOLD SPECIMEN: NORMAL
IMM GRANULOCYTES # BLD: 0.3 10E3/UL
IMM GRANULOCYTES NFR BLD: 2 %
KETONES UR STRIP-MCNC: NEGATIVE MG/DL
LEUKOCYTE ESTERASE UR QL STRIP: NEGATIVE
LYMPHOCYTES # BLD AUTO: 2.4 10E3/UL (ref 0.8–5.3)
LYMPHOCYTES NFR BLD AUTO: 18 %
MCH RBC QN AUTO: 29.7 PG (ref 26.5–33)
MCHC RBC AUTO-ENTMCNC: 34.8 G/DL (ref 31.5–36.5)
MCV RBC AUTO: 85 FL (ref 78–100)
MONOCYTES # BLD AUTO: 0.9 10E3/UL (ref 0–1.3)
MONOCYTES NFR BLD AUTO: 7 %
NEUTROPHILS # BLD AUTO: 9.4 10E3/UL (ref 1.6–8.3)
NEUTROPHILS NFR BLD AUTO: 70 %
NITRATE UR QL: NEGATIVE
NRBC # BLD AUTO: 0 10E3/UL
NRBC BLD AUTO-RTO: 0 /100
PH UR STRIP: 6.5 [PH] (ref 5–9)
PLATELET # BLD AUTO: 231 10E3/UL (ref 150–450)
POTASSIUM SERPL-SCNC: 3.4 MMOL/L (ref 3.4–5.3)
PROT SERPL-MCNC: 6.2 G/DL (ref 6.4–8.3)
PROT/CREAT 24H UR: NORMAL MG/G{CREAT}
RBC # BLD AUTO: 4.08 10E6/UL (ref 3.8–5.2)
SODIUM SERPL-SCNC: 135 MMOL/L (ref 136–145)
SP GR UR STRIP: 1 (ref 1–1.03)
TRICHOMONAS, WET PREP: ABNORMAL
UROBILINOGEN UR STRIP-MCNC: NORMAL MG/DL
WBC # BLD AUTO: 13.2 10E3/UL (ref 4–11)
WBC'S/HIGH POWER FIELD, WET PREP: ABNORMAL
YEAST, WET PREP: ABNORMAL

## 2023-05-23 PROCEDURE — 84156 ASSAY OF PROTEIN URINE: CPT | Performed by: OBSTETRICS & GYNECOLOGY

## 2023-05-23 PROCEDURE — 999N000104 HC STATISTIC NO CHARGE: Performed by: FAMILY MEDICINE

## 2023-05-23 PROCEDURE — 87210 SMEAR WET MOUNT SALINE/INK: CPT | Performed by: OBSTETRICS & GYNECOLOGY

## 2023-05-23 PROCEDURE — 80053 COMPREHEN METABOLIC PANEL: CPT | Performed by: OBSTETRICS & GYNECOLOGY

## 2023-05-23 PROCEDURE — 36415 COLL VENOUS BLD VENIPUNCTURE: CPT | Performed by: OBSTETRICS & GYNECOLOGY

## 2023-05-23 PROCEDURE — 81003 URINALYSIS AUTO W/O SCOPE: CPT | Performed by: OBSTETRICS & GYNECOLOGY

## 2023-05-23 PROCEDURE — G0463 HOSPITAL OUTPT CLINIC VISIT: HCPCS

## 2023-05-23 PROCEDURE — 85025 COMPLETE CBC W/AUTO DIFF WBC: CPT | Performed by: OBSTETRICS & GYNECOLOGY

## 2023-05-23 RX ORDER — LIDOCAINE 40 MG/G
CREAM TOPICAL
Status: DISCONTINUED | OUTPATIENT
Start: 2023-05-23 | End: 2023-05-24 | Stop reason: HOSPADM

## 2023-05-23 ASSESSMENT — ACTIVITIES OF DAILY LIVING (ADL): ADLS_ACUITY_SCORE: 35

## 2023-05-24 NOTE — PROGRESS NOTES
Patient arrived to [facility] with complaints of contractions and lower leg swelling. EUM/EFM applied, see flow sheets for VS.

## 2023-05-24 NOTE — PROGRESS NOTES
NSG DISCHARGE NOTE    Patient discharged to home at 10:07 PM via ambulation. Accompanied by spouse and staff. Discharge instructions reviewed with patient and caregiver, opportunity offered to ask questions. Prescriptions - None ordered for discharge. All belongings sent with patient.    Delia Thurman RN

## 2023-05-24 NOTE — PROGRESS NOTES
MD notified of lab results. Pre eclampsia workup negative. Okay to discharge patient home. Discharge instructions reviewed with patient who verbalizes understanding.

## 2023-05-24 NOTE — ED TRIAGE NOTES
Patient here with lower leg edema that she noted today and also some contractions that started around 1730.  States she has been having bubba walsh, but those contractions were different as they were lower.   Triage Assessment     Row Name 05/23/23 2015       Triage Assessment (Adult)    Airway WDL WDL       Respiratory WDL    Respiratory WDL WDL       Cardiac WDL    Cardiac WDL WDL       Peripheral/Neurovascular WDL    Peripheral Neurovascular WDL WDL       Cognitive/Neuro/Behavioral WDL    Cognitive/Neuro/Behavioral WDL WDL

## 2023-05-25 ENCOUNTER — PRENATAL OFFICE VISIT (OUTPATIENT)
Dept: OBGYN | Facility: OTHER | Age: 23
End: 2023-05-25
Attending: OBSTETRICS & GYNECOLOGY
Payer: COMMERCIAL

## 2023-05-25 ENCOUNTER — HOSPITAL ENCOUNTER (OUTPATIENT)
Dept: ULTRASOUND IMAGING | Facility: OTHER | Age: 23
Discharge: HOME | End: 2023-05-25
Attending: OBSTETRICS & GYNECOLOGY
Payer: COMMERCIAL

## 2023-05-25 VITALS
SYSTOLIC BLOOD PRESSURE: 122 MMHG | BODY MASS INDEX: 35.19 KG/M2 | DIASTOLIC BLOOD PRESSURE: 72 MMHG | WEIGHT: 205 LBS | HEART RATE: 92 BPM

## 2023-05-25 DIAGNOSIS — Z34.82 NORMAL PREGNANCY IN MULTIGRAVIDA IN SECOND TRIMESTER: ICD-10-CM

## 2023-05-25 DIAGNOSIS — Z34.90 NORMAL PREGNANCY, ANTEPARTUM: ICD-10-CM

## 2023-05-25 DIAGNOSIS — O35.HXX0 SHORT FEMUR OF FETUS ON PRENATAL ULTRASOUND: Primary | ICD-10-CM

## 2023-05-25 PROCEDURE — 99207 PR OB VISIT-NO CHARGE - GICH ONLY: CPT | Performed by: OBSTETRICS & GYNECOLOGY

## 2023-05-25 PROCEDURE — 76816 OB US FOLLOW-UP PER FETUS: CPT

## 2023-05-25 ASSESSMENT — PATIENT HEALTH QUESTIONNAIRE - PHQ9
SUM OF ALL RESPONSES TO PHQ QUESTIONS 1-9: 1
SUM OF ALL RESPONSES TO PHQ QUESTIONS 1-9: 1
10. IF YOU CHECKED OFF ANY PROBLEMS, HOW DIFFICULT HAVE THESE PROBLEMS MADE IT FOR YOU TO DO YOUR WORK, TAKE CARE OF THINGS AT HOME, OR GET ALONG WITH OTHER PEOPLE: NOT DIFFICULT AT ALL

## 2023-05-25 NOTE — PROGRESS NOTES
CC: Recheck OB visit at 32w6d    HPI: Mya Gant presents for a routine OB visit now at 32w6d  Concerns: BV is better, Patient is concerned about short femurs. Normal Down Syndrome NIPT study.    Patient notices normal fetal movement, denies contractions, vaginal bleeding or leaking of fluid.    OB History    Para Term  AB Living   1 0 0 0 0 0   SAB IAB Ectopic Multiple Live Births   0 0 0 0 0      # Outcome Date GA Lbr Landon/2nd Weight Sex Delivery Anes PTL Lv   1 Current              Current Outpatient Medications   Medication     Magnesium Oxide 250 MG TABS     Omega-3 Fatty Acids (SEA-OMEGA PO)     Prenatal Vit-Fe Fumarate-FA (PRENATAL MULTIVITAMIN W/IRON) 27-0.8 MG tablet     Probiotic Product (PROBIOTIC DAILY PO)     No current facility-administered medications for this visit.     O: /72   Pulse 92   Wt 93 kg (205 lb)   LMP 2022 (Approximate)   BMI 35.19 kg/m    Body mass index is 35.19 kg/m .  See OB flow sheet  EXAM:  NAD    Results for orders placed or performed during the hospital encounter of 22   US OB >14 Weeks Follow Up     Status: None    Narrative    OB ULTRASOUND - Transabdominal     Clinical: Normal pregnancy in multigravida in second trimester.   Gestation: 1  Comparison: 2023  Presentation: Cephalic  Cardiac Activity: Regular at 133 bpm  Movement: Yes  Placenta: Anterior rdGrdrrdarddrderd:rd rd3rd Amniotic Fluid: Normal SHELLY: 21.7 cm    Measurements ([place]):  BPD: 28%  HC: 25%  AC: 16%  FL: 6%    Estimated Fetal Weight: 1818 grams  HC/AC: 1.09  US age (current): 32 weeks 2 days  Gestational age: 32 weeks 6 days  US EDC (preferred):  23   %WT for EGA (preferred dating): 12 %      Impression    IMPRESSION: Single viable intrauterine pregnancy demonstrating an  estimated fetal weight at the 12th percentile for EGA.    TOSIN WOMACK MD         SYSTEM ID:  [id number]     A/P: 32w6d gestation    Recheck in 2 weeks  Repeat US in 4 weeks    Problem List:   Pregnancy  risk factors include:  Palpitations, dyspnea with exercise.  Echo normal with PFO noted.  Declining TDAP  Rhogam given at April visit.    Jose Aviles MD FACOG  3:37 PM 5/25/2023

## 2023-05-25 NOTE — NURSING NOTE
"Chief Complaint   Patient presents with     Prenatal Care     32 week 6 days   Pt presents to clinic today for prenatal care 32 week 6 days. Pt denies any bleeding, or leakage of fluid at this time. States baby is moving good. Patient denies contractions.     Initial /72   Pulse 92   Wt 93 kg (205 lb)   LMP 09/28/2022 (Approximate)   BMI 35.19 kg/m   Estimated body mass index is 35.19 kg/m  as calculated from the following:    Height as of 5/23/23: 1.626 m (5' 4\").    Weight as of this encounter: 93 kg (205 lb).  Medication Reconciliation: complete        Mariana Madrid, AWA  "

## 2023-05-30 NOTE — PATIENT INSTRUCTIONS
The Benefits of Breastmilk  Breastmilk is the best food for your baby. It has just the right amount of nutrients. It protects your baby's digestive system. It protects other body systems in your baby. And it helps them grow and develop.       Healthiest for baby  Breastmilk is the ideal food for babies. It has all the nutrients your baby needs to grow healthy and strong.    Breastmilk has these benefits:    It lowers the risk for sudden infant death syndrome (SIDS).    It gives babies a lower risk for ear infections in their first year. This is compared to babies who are fed formula.    It has DHA. This is a type of fat. It helps your baby s growing brain, nervous system, and eyes.    It is full of antibodies. These help your baby fight infection.    It lowers your baby's risk for lung illness. It lowers their risk of diarrhea.    It lowers your baby s risk for allergies. Babies fed formula are more likely to have an allergy to cow's milk.    It lowers your baby s risk for colds and many other diseases.    It changes as your baby grows. This meets your baby's changing needs.  And it s important to know that:    Giving only breastmilk for the first 6 months gives your baby more of these benefits.    Giving breastmilk plus solid food from 6 months to 1 year or more gives more benefits.     babies have fewer long-term health problems when they grow up. These problems include diabetes and obesity.    Breastfeeding gives contact that your baby loves. Spending time skin-to-skin with you is calming and comforting.  Healthiest for mom  For many people, breastfeeding is a good experience. It creates a strong bond between mother and baby. People who breastfeed also get health benefits. Some benefits for you include:     You can know that your baby is growing healthy and strong because of your milk.    Breastmilk is convenient. It's free and clean. It's always at the right temperature.    Breastfeeding burns  calories. This can help you lose pregnancy weight faster.    Breastfeeding releases hormones that contract the uterus. This helps the uterus return to its normal size after childbirth.    Mothers who breastfeed have a lower risk for ovarian and breast cancers.    Some studies have found that breastfeeding may reduce a person's risk for type 2 diabetes and rheumatoid arthritis. It may reduce the risk of cardiovascular disease. This includes high blood pressure and high cholesterol.    Breastfeeding every day delays the return of your menstrual period. This can help extend the time between pregnancies.  Many people can help you learn to breastfeed. A lactation consultant can help. This is a healthcare provider who is trained to help you breastfeed. Your nurse, midwife, nurse practitioner, obstetrician, pediatrician, or family practice doctor can also help you learn about breastfeeding.   What does it mean to give only breastmilk?   Giving only breastmilk for at least the first 6 months of life is best for your baby. Feeding your baby from your breasts is best. If you need to be away from your baby, you can express breastmilk. This means pumping milk from your breast into a container. Talk with your healthcare provider about the best ways to feed this milk to your baby.    You should not give your baby water, sugar water, formula, or solids during their first 6 months unless your baby's healthcare provider tells you to.   Your baby s provider may tell you to give your baby vitamins, minerals, or medicines.  babies should be given vitamin D supplements. The provider will tell you the type and amount of vitamin D to give your baby.   What are the risks of not giving only breastmilk?   You now know the many of the benefits of breastfeeding. But you might not know why it's important to give only breastmilk for at least 6 months.   Your baby gets the best protection against health problems when they get only  breastmilk. Breastfeeding some of the time is good. But breastfeeding all of the time is best.   Giving your baby formula or other liquids may cause you to:    Have more problems breastfeeding    Make less milk    Be less confident in breastfeeding    Breastfeed less often    Stop breastfeeding before your baby is at least 12 months old                                                     When other options may be needed  Giving only breastmilk is almost always the best thing to do. But your healthcare provider may have reasons to advise giving your baby formula or other liquids. They include:     Your baby has a health problem. There are cases where you may need to add formula or other liquids. This is often only for a short time. This may be the case if your baby has low blood sugar (hypoglycemia), loses body fluids (dehydration), or has high levels of bilirubin.    You have certain health problems. Some infections can be passed from your skin to your baby's skin. Or it can pass through your breastmilk. People with HIV/AIDS or untreated and contagious TB (tuberculosis) should not breastfeed. Women with active skin sores from chickenpox (varicella) can pump their breastmilk and feed their baby. But they should keep their baby s skin from touching any of the sores.    You use illegal drugs or drink alcohol. People who use illegal drugs should not breastfeed. If you are going to have a drink that has alcohol, it's best to do so just after you nurse or pump milk. Breastfeeding or pumping breast milk is OK at least 4 hours after your last drink. That way, your body will have some time to get rid of the alcohol before the next feeding. Less of it will reach your baby. Long-term exposure to alcohol in breastmilk may affect your baby's health. It may also cause you to make less milk.    You take certain medicines. If you take any medicines, ask your baby s healthcare provider if you can breastfeed.  [facility] last reviewed  this educational content on 2020-2022 [employer]. All rights reserved. This information is not intended as a substitute for professional medical care. Always follow your healthcare professional's instructions.          What Is Group B Strep?  Group B strep (streptococcus) is a common type of bacteria. It can grow in a woman s vagina, rectum, or urinary tract. It most often does not cause harm in adults. But in rare cases, a woman who has group B strep can infect her baby during the birth. This can cause serious illness in the . But treating the mother during labor reduces the risk of the baby becoming infected. And if a  gets group B strep, the infection can be treated.     Facts about group B strep   Learning more about group B strep can help you understand how testing and treatment can help. Here are some basic facts about group B strep:     It is not a sexually transmitted disease.    It is not the same as strep throat. (That is caused by group A strep.)    It often has no symptoms. It may cause no problems in adults.    Test results can be misleading. They may be negative one week and positive the next week.    Group B strep can be spread to the baby during vaginal delivery. It cannot be passed during  (surgical) birth.    A mother with group B strep rarely infects her . (Infection happens only about 1% to 2% of the time.)    When a mother is treated during labor and delivery, her baby almost never becomes infected.    Certain factors during pregnancy increase the risk of a baby becoming infected.  Possible effects on your baby   Group B strep can infect the blood. It can also cause inflammation of the baby s lungs, brain, or spinal cord. Long-term effects can include blindness, deafness, mental retardation, or cerebral palsy. And in rare cases, infection causes death. Infection is most often found soon after the baby is born.   How your baby may become  infected   Group B strep often lives in the vagina or rectum. If the amniotic sac breaks early, bacteria from the vagina can travel to the uterus, reaching the baby. Or, as the baby passes through the birth canal, it can come in contact with the bacteria. In rare cases, group B strep can be passed to the baby after delivery. This is called late-onset group B strep. The source of this type of infection is not well understood. But some experts believe that it happens if the baby is exposed to group B strep in the home, from the parents or siblings, or in the community.   What increases the risk?   Certain risk factors increase the chance that a baby will be infected. They include:     Breaking or leaking of the amniotic sac before 37 weeks of pregnancy    Labor before 37 weeks of pregnancy    Breaking of the amniotic sac more than 18 hours before labor starts    Fever during labor    A urinary tract infection with group B strep at any point in the pregnancy    Having a previous baby born with a group B strep infection  [employer] last reviewed this educational content on 2020-2022 [employer]. All rights reserved. This information is not intended as a substitute for professional medical care. Always follow your healthcare professional's instructions.          Vitamin K Deficiency Bleeding (VKDB) in a [facility] Baby  What is vitamin K deficiency bleeding in a ?  Vitamin K deficiency bleeding (VKDB) is a problem that occurs in some  babies. It most often happens during the first few days and weeks of life. But it can occur up to 6 months of age. This condition used to be called hemorrhagic disease of the .    What causes vitamin K deficiency bleeding in a ?  Babies are normally born with low levels of vitamin K. Vitamin K is needed for blood to clot. Not having enough vitamin K is the main cause of vitamin deficiency bleeding. If your baby s blood doesn t clot, they may  have severe bleeding or a hemorrhage. This can be life-threatening. The cause of vitamin K deficiency depends on the 3 types of VKDB:     Early VKDB. This can occur right after birth or up to 24 hours of age. It's caused by certain medicines the mother took during pregnancy    Classical VKDB. This occurs from 1 to 7 days after birth. It's caused by low levels of vitamin K found in newborns.    Late VKDB. This most often occurs up to 3 months after birth. But it can occur up to 6 months after birth. It can occur in a baby who did not get a vitamin K shot at birth and who was  only.    Which newborns are at risk for vitamin K deficiency bleeding?   These things may make it more likely for a baby to have this condition:     Not getting a vitamin K shot at birth.The American Academy of Pediatrics recommends that all newborns get a vitamin K shot. This can prevent severe bleeding.     Being  only and not getting a vitamin K shot at birth.  Breastmilk has less vitamin K than formula made with cow s milk. The vitamin K shot will provide what a  baby needs. A mother who takes a vitamin K supplement while breastfeeding will not raise her baby's vitamin K level.    Being born to a mother who took certain medicines during pregnancy.  These include medicines for seizures (anticonvulsants) and medicines for blood-clotting problems (anticoagulants).  What are the symptoms of vitamin K deficiency bleeding in a ?   Symptoms can occur a bit differently in each child. They can include:     Blood in your baby's stool that make it black and sticky (tarry)    Blood in your baby's urine    Oozing of blood from around your baby s umbilical cord or circumcision site    Bruising more easily than normal. This may happen around your baby's head and face.    Beingvery sleepy or fussy. In severe cases, vitamin K deficiency may cause bleeding in and around the brain. Other signs of bleeding in the brain can  include seizures or vomiting, not just spitting up.  The symptoms of this condition may be similar to symptoms of other health issues. Make sure your child sees a healthcare provider for a diagnosis.   How is vitamin K deficiency bleeding in a  diagnosed?   The healthcare provider will look at your baby's health history. They will also check your baby for signs of bleeding. Your baby may need lab tests to measure their blood clotting times. The results of these tests can help your child s healthcare provider make the diagnosis.   How is vitamin K deficiency bleeding in a  treated?   Treatment will depend on your child s symptoms, age, and general health. It will also depend on how severe the condition is.   Your baby will probably get a vitamin K shot.   Your baby may need a blood transfusion if they have severe bleeding. If your baby is severely ill, they may need to be treated in the intensive care unit (ICU).   What are possible complications of vitamin K deficiency bleeding in a ?   Vitamin K deficiency bleeding can lead to life-threatening problems. These include dangerous bleeding that can lead to brain damage or death. In the [place] deaths and long-term complications from vitamin K deficiency have been greatly lowered because of vitamin K shots given at birth. But bleeding into the brain, central nervous system, stomach, intestines, or other parts of the body can cause serious problems, or even death.   Can vitamin K deficiency bleeding in a  be prevented?   This condition can be prevented. The American Academy of Pediatrics recommends that all newborns get a vitamin K shot. Your child will get a shot into their upper leg (thigh) muscle. This shot will be given soon after birth. This will prevent dangerous bleeding.   Key points about vitamin K deficiency bleeding in a      Vitamin K deficiency bleeding is a problem that occurs in some newborns. It often happens during the  first few days of life.    Babies are normally born with low levels of vitamin K. Not having enough vitamin K is the main cause of this condition.    Your child s healthcare provider will diagnose this condition. This will be based on your child s signs of bleeding and lab tests for blood clotting times.    The American Academy of Pediatrics recommends that all newborns get a vitamin K shot. This can prevent this condition.     Next steps  Tips to help you get the most from a visit to your child s healthcare provider:     Know the reason for the visit and what you want to happen.    Before your visit, write down questions you want answered.    At the visit, write down the name of a new diagnosis, and any new medicines, treatments, or tests. Also write down any new instructions your provider gives you for your child.    Know why a new medicine or treatment is prescribed and how it will help your child. Also know what the side effects are.    Ask if your child s condition can be treated in other ways.    Know why a test or procedure is recommended and what the results could mean.    Know what to expect if your child does not take the medicine or have the test or procedure.    If your child has a follow-up appointment, write down the date, time, and purpose for that visit.    Know how you can contact your child s provider after office hours. This is important if your child becomes ill and you have questions or need advice.  [employer] last reviewed this educational content on 4/1/2022 2000-2022 [employer]. All rights reserved. This information is not intended as a substitute for professional medical care. Always follow your healthcare professional's instructions.          Circumcision for Children  What is circumcision for children?  Circumcision is a surgery to remove the skin covering the end of the penis. This skin is called the foreskin. This surgery is most often done 1 or 2 days after a baby s  birth. Circumcision can also be done on older children. This can be more complex. An older child may need medicine (general anesthesia) to put them to sleep during the procedure.   Why might my child need circumcision?  In some cultures, circumcision is a Catholic practice or a tradition. It's most common in Rastafari and Islamic faiths. In the [place]  circumcision isn't required. It's an elective procedure. This means you can choose to have your child circumcised or not. Circumcision is often done 1 to 2 days after birth. It's helpful to decide before your baby is born.   It's important to learn about the benefits and risks of circumcision. According to the American Academy of Pediatrics (AAP):     Problems with the penis (such as irritation) can happen with or without circumcision.    There is no difference in health and cleanliness (hygiene) with or without circumcision, as long as a child can handle cleaning and care.    There is a higher risk of urinary tract infection (UTI) in uncircumcised children. This is more so in babies younger than 1 year old. But the risk for UTI in all children is less than 1%.    Sentinel circumcision does give some protection from cancer of the penis later in life. But the overall risk of penile cancer is very low in developed countries, such as the [place]    Circumcised kids and adults have a lower risk for some sexually transmitted infections. This includes HIV.  The AAP has found that the health benefits of circumcision are greater than the risks. But the AAP also found that these benefits are not great enough to advise that all  babies be circumcised. Parents must decide what's best for their baby.   What are the risks of circumcision for a child?  Circumcision has some risks. But the rate of problems is low. The most common risks are bleeding and infection.   The skin of the penis is also very sensitive after a circumcision. The area can get irritated from contact with  the baby s diaper or with the ammonia in urine. This can be treated by putting petroleum jelly on the penis for a few days.                                         There may be other risks. This depends on your baby s health. Talk about any concerns you have with the healthcare provider before the surgery.   How do I help my child get ready for circumcision?  Make sure the healthcare provider fully explains the procedure. Ask if anesthetic is used for a circumcision. The AAP advises anesthetic. This helps reduce a baby s pain during the procedure.   If your baby is born early or has other health problems, they may not be circumcised until they're ready to leave the hospital. If your baby has a physical problem with their penis, they may not be circumcised. This is because the foreskin is used in a future surgery on the penis.   What happens during circumcision for a child?  The procedure is usually done by an obstetrician or pediatrician in the hospital. When it's done for [employer] reasons, other people may do the surgery after the baby comes home from the hospital.   Circumcision is done only on healthy babies. The procedure is painful. So the AAP advises using a local anesthetic. This numbs the area of the penis where the incision will be made. There are different types of anesthetic. A healthcare provider may put a numbing cream on your child s penis. Or they may inject small amounts of anesthetic around the penis. There are risks with any anesthetic, but these are considered safe. In addition to the anesthetic, your provider may give your baby a pacifier dipped in sugar water. This can help soothe them while the procedure is happening.   A circumcision can be done in several ways. The procedure usually takes about 15 minutes or less. The procedure goes like this:     The healthcare provider will give your baby a local anesthetic.    The provider then cleans the penis with an antiseptic.    The provider will  gently loosen the foreskin from around the head of the penis, making a small slit in the foreskin.    The provider may use 1 of the common methods to remove the foreskin. These methods use devices that help protect the penis while removing the foreskin.    The provider may attach a clamp over the head of the penis. Or the provider may place a plastic ring over the head of the penis. This makes it easier to cut the foreskin.    The provider will use surgical tools to remove the foreskin. This exposes the end of the penis.    The provider may place some petroleum jelly or ointment on the head of the penis and cover it with a loose gauze dressing.  What happens after circumcision for a child?  After the circumcision, you'll need to care for your baby s penis until it heals. This includes cleaning the area with plain water at least once a day. You'll also need to clean it if the area is dirty after a bowel movement. Then let the area dry, and put petroleum jelly on it. This keeps the gauze dressing from sticking.   You may be asked to remove the dressing the next day. Or you may be asked to use a new dressing, and some petroleum jelly, each time you change diapers. When the gauze dressing is no longer needed, you may be told to keep putting petroleum jelly on the end of the penis for a few more days. This helps prevent the penis from sticking to the diaper.   Some swelling on the penis is normal. It's also normal for the penis to develop a crust. This will go away after a few days. A small amount of bleeding may occur. But if you see a blood stain on your baby s diaper that's bigger than a quarter, call the healthcare provider right away. If the penis keeps bleeding, apply firm pressure with a washcloth for a few minutes. Then look to see if the bleeding has stopped. If the bleeding continues, bring your child to the emergency room.   If a plastic ring was used, it should fall off in 10 to 12 days. Tell your healthcare  "provider if this doesn t happen.   A baby s penis usually fully heals from a circumcision in 7 to 10 days.   Call your child s healthcare provider if your baby has any of the following:     Fever (see \"Fever and children\" below)    Wound that doesn t stop bleeding    No urine 6 to 8 hours after the procedure    Redness or swelling that doesn t get better after 3 days, or gets worse    Yellow discharge or yellow coating on the penis after 7 days  Fever and children  Use a digital thermometer to check your child s temperature. Don t use a mercury thermometer. There are different kinds and uses of digital thermometers. They include:     Rectal. For children younger than 3 years, a rectal temperature is the most accurate.    Forehead (temporal). This works for children age 3 months and older. If a child under 3 months old has signs of illness, this can be used for a first pass. The provider may want to confirm with a rectal temperature.    Ear (tympanic). Ear temperatures are accurate after 6 months of age, but not before.    Armpit (axillary). This is the least reliable but may be used for a first pass to check a child of any age with signs of illness. The provider may want to confirm with a rectal temperature.    Mouth (oral). Don t use a thermometer in your child s mouth until he or she is at least 4 years old.  Use the rectal thermometer with care. Follow the product maker s directions for correct use. Insert it gently. Label it and make sure it s not used in the mouth. It may pass on germs from the stool. If you don t feel OK using a rectal thermometer, ask the healthcare provider what type to use instead. When you talk with any healthcare provider about your child s fever, tell him or her which type you used.   Below are guidelines to know if your young child has a fever. Your child s healthcare provider may give you different numbers for your child. Follow your provider s specific instructions.   Fever readings " for a baby under 3 months old:     First, ask your child s healthcare provider how you should take the temperature.    Rectal or forehead: 100.4 F (38 C) or higher    Armpit: 99 F (37.2 C) or higher  Fever readings for a child age 3 months to 36 months (3 years):     Rectal, forehead, or ear: 102 F (38.9 C) or higher    Armpit: 101 F (38.3 C) or higher  Call the healthcare provider in these cases:     Repeated temperature of 104 F (40 C) or higher in a child of any age    Fever of 100.4 F (38 C) or higher in baby younger than 3 months    Fever that lasts more than 24 hours in a child under age 2    Fever that lasts for 3 days in a child age 2 or older  Next steps  Before you agree to the test or the procedure for your child make sure you know:     The name of the test or procedure    The reason your child is having the test or procedure    What results to expect and what they mean    The risks and benefits of the test or procedure    When and where your child is to have the test or procedure    Who will do the procedure and what that person s qualifications are    What would happen if your child did not have the test or procedure    Any alternative tests or procedures to think about    When and how will you get the results    Who to call after the test or procedure if you have questions or your child has problems    How much will you have to pay for the test or procedure  Sindy last reviewed this educational content on 3/1/2022    8212-1107 [employer]. All rights reserved. This information is not intended as a substitute for professional medical care. Always follow your healthcare professional's instructions.

## 2023-06-04 ENCOUNTER — HEALTH MAINTENANCE LETTER (OUTPATIENT)
Age: 23
End: 2023-06-04

## 2023-06-05 ENCOUNTER — PRENATAL OFFICE VISIT (OUTPATIENT)
Dept: OBGYN | Facility: OTHER | Age: 23
End: 2023-06-05
Attending: OBSTETRICS & GYNECOLOGY
Payer: COMMERCIAL

## 2023-06-05 VITALS
DIASTOLIC BLOOD PRESSURE: 72 MMHG | WEIGHT: 209 LBS | HEART RATE: 97 BPM | BODY MASS INDEX: 35.87 KG/M2 | SYSTOLIC BLOOD PRESSURE: 122 MMHG

## 2023-06-05 DIAGNOSIS — Z34.90 NORMAL PREGNANCY, ANTEPARTUM: Primary | ICD-10-CM

## 2023-06-05 PROCEDURE — 99207 PR OB VISIT-NO CHARGE - GICH ONLY: CPT | Performed by: OBSTETRICS & GYNECOLOGY

## 2023-06-05 ASSESSMENT — PATIENT HEALTH QUESTIONNAIRE - PHQ9
10. IF YOU CHECKED OFF ANY PROBLEMS, HOW DIFFICULT HAVE THESE PROBLEMS MADE IT FOR YOU TO DO YOUR WORK, TAKE CARE OF THINGS AT HOME, OR GET ALONG WITH OTHER PEOPLE: NOT DIFFICULT AT ALL
SUM OF ALL RESPONSES TO PHQ QUESTIONS 1-9: 1
SUM OF ALL RESPONSES TO PHQ QUESTIONS 1-9: 1

## 2023-06-05 NOTE — PROGRESS NOTES
CC: Recheck OB visit at 34w3d    HPI: Mya Gant presents for a routine OB visit now at 34w3d  Concerns: rib pain  Patient notices normal fetal movement, denies contractions, vaginal bleeding or leaking of fluid.    OB History    Para Term  AB Living   1 0 0 0 0 0   SAB IAB Ectopic Multiple Live Births   0 0 0 0 0      # Outcome Date GA Lbr Landon/2nd Weight Sex Delivery Anes PTL Lv   1 Current              Current Outpatient Medications   Medication     Magnesium Oxide 250 MG TABS     Omega-3 Fatty Acids (SEA-OMEGA PO)     Prenatal Vit-Fe Fumarate-FA (PRENATAL MULTIVITAMIN W/IRON) 27-0.8 MG tablet     Probiotic Product (PROBIOTIC DAILY PO)     No current facility-administered medications for this visit.     O: /72   Pulse 97   Wt 94.8 kg (209 lb)   LMP 2022 (Approximate)   BMI 35.87 kg/m    Body mass index is 35.87 kg/m .  See OB flow sheet  EXAM:  NAD  Normal AFV, normal fetal biometry on bedside US today    No results found for any visits on 23.    A/P: 34w3d gestation    Recheck in 1 week    Problem List:   Pregnancy risk factors include:  Palpitations, dyspnea with exercise.  Echo normal with PFO noted.  Declining TDAP  Rhogam given at April visit.    Jose Aviles MD FACOG  4:10 PM 2023   Answers for HPI/ROS submitted by the patient on 2023  If you checked off any problems, how difficult have these problems made it for you to do your work, take care of things at home, or get along with other people?: Not difficult at all  PHQ9 TOTAL SCORE: 1

## 2023-06-05 NOTE — NURSING NOTE
"Chief Complaint   Patient presents with     Prenatal Care     34 w 3 d     Pt presents to clinic today for prenatal care 34 w 3 day. Pt denies any bleeding, or leakage of fluid at this time. States baby is moving good. Patient denies contractions.   Initial /72   Pulse 97   Wt 94.8 kg (209 lb)   LMP 09/28/2022 (Approximate)   BMI 35.87 kg/m   Estimated body mass index is 35.87 kg/m  as calculated from the following:    Height as of 5/23/23: 1.626 m (5' 4\").    Weight as of this encounter: 94.8 kg (209 lb).  Medication Reconciliation: complete          Mariana Madrid LPN  "

## 2023-06-13 ENCOUNTER — HOSPITAL ENCOUNTER (OUTPATIENT)
Facility: OTHER | Age: 23
Discharge: HOME | End: 2023-06-13
Attending: OBSTETRICS & GYNECOLOGY | Admitting: OBSTETRICS & GYNECOLOGY
Payer: COMMERCIAL

## 2023-06-13 ENCOUNTER — HOSPITAL ENCOUNTER (OUTPATIENT)
Facility: OTHER | Age: 23
End: 2023-06-13
Admitting: OBSTETRICS & GYNECOLOGY
Payer: COMMERCIAL

## 2023-06-13 VITALS
RESPIRATION RATE: 16 BRPM | TEMPERATURE: 97.2 F | SYSTOLIC BLOOD PRESSURE: 119 MMHG | HEART RATE: 97 BPM | OXYGEN SATURATION: 97 % | DIASTOLIC BLOOD PRESSURE: 75 MMHG

## 2023-06-13 DIAGNOSIS — N76.0 BV (BACTERIAL VAGINOSIS): Primary | ICD-10-CM

## 2023-06-13 DIAGNOSIS — B96.89 BV (BACTERIAL VAGINOSIS): Primary | ICD-10-CM

## 2023-06-13 PROCEDURE — 87210 SMEAR WET MOUNT SALINE/INK: CPT | Performed by: OBSTETRICS & GYNECOLOGY

## 2023-06-13 PROCEDURE — G0463 HOSPITAL OUTPT CLINIC VISIT: HCPCS | Mod: 25

## 2023-06-13 RX ORDER — METRONIDAZOLE 500 MG/1
500 TABLET ORAL 2 TIMES DAILY
Qty: 14 TABLET | Refills: 0 | Status: SHIPPED | OUTPATIENT
Start: 2023-06-13 | End: 2023-06-20

## 2023-06-13 ASSESSMENT — ACTIVITIES OF DAILY LIVING (ADL): ADLS_ACUITY_SCORE: 31

## 2023-06-13 NOTE — PROGRESS NOTES
NSG DISCHARGE NOTE    Patient discharged to home at 6:00 PM via ambulation. Accompanied by and staff. Discharge instructions reviewed with patient, opportunity offered to ask questions. Prescriptions - None ordered for discharge. All belongings sent with patient.    Elen Nielsen RN

## 2023-06-13 NOTE — PROGRESS NOTES
22 year old  at 35 4/7 weeks to [facility] room 404 from home. Patient reports a discharge that she has had for two days that is white and green in color. She denies an odor or itching. She has no vaginal bleeding, fluid leaking and her baby is very active. EFM/EUM applied FHT's 145, occasional contractions noted. CARMINE Heard notified of patients status orders received.  Will continue to monitor.

## 2023-06-15 ENCOUNTER — PRENATAL OFFICE VISIT (OUTPATIENT)
Dept: OBGYN | Facility: OTHER | Age: 23
End: 2023-06-15
Attending: OBSTETRICS & GYNECOLOGY
Payer: COMMERCIAL

## 2023-06-15 ENCOUNTER — MYC MEDICAL ADVICE (OUTPATIENT)
Dept: OBGYN | Facility: OTHER | Age: 23
End: 2023-06-15

## 2023-06-15 ENCOUNTER — HOSPITAL ENCOUNTER (OUTPATIENT)
Dept: ULTRASOUND IMAGING | Facility: OTHER | Age: 23
Discharge: HOME | End: 2023-06-15
Attending: OBSTETRICS & GYNECOLOGY
Payer: COMMERCIAL

## 2023-06-15 VITALS
SYSTOLIC BLOOD PRESSURE: 120 MMHG | WEIGHT: 210 LBS | BODY MASS INDEX: 36.05 KG/M2 | HEART RATE: 92 BPM | DIASTOLIC BLOOD PRESSURE: 72 MMHG

## 2023-06-15 DIAGNOSIS — Z34.90 NORMAL PREGNANCY, ANTEPARTUM: Primary | ICD-10-CM

## 2023-06-15 DIAGNOSIS — O40.3XX0 POLYHYDRAMNIOS AFFECTING PREGNANCY IN THIRD TRIMESTER: Primary | ICD-10-CM

## 2023-06-15 DIAGNOSIS — O35.HXX0 SHORT FEMUR OF FETUS ON PRENATAL ULTRASOUND: ICD-10-CM

## 2023-06-15 PROCEDURE — 99207 PR OB VISIT-NO CHARGE - GICH ONLY: CPT | Performed by: OBSTETRICS & GYNECOLOGY

## 2023-06-15 PROCEDURE — 76816 OB US FOLLOW-UP PER FETUS: CPT

## 2023-06-15 PROCEDURE — 87653 STREP B DNA AMP PROBE: CPT | Mod: ZL | Performed by: OBSTETRICS & GYNECOLOGY

## 2023-06-15 ASSESSMENT — PATIENT HEALTH QUESTIONNAIRE - PHQ9
SUM OF ALL RESPONSES TO PHQ QUESTIONS 1-9: 0
SUM OF ALL RESPONSES TO PHQ QUESTIONS 1-9: 0
10. IF YOU CHECKED OFF ANY PROBLEMS, HOW DIFFICULT HAVE THESE PROBLEMS MADE IT FOR YOU TO DO YOUR WORK, TAKE CARE OF THINGS AT HOME, OR GET ALONG WITH OTHER PEOPLE: NOT DIFFICULT AT ALL

## 2023-06-15 ASSESSMENT — PAIN SCALES - GENERAL: PAINLEVEL: NO PAIN (0)

## 2023-06-15 NOTE — NURSING NOTE
"Chief Complaint   Patient presents with     Prenatal Care     35w6 d   Pt presents to clinic today for prenatal care 35w 6d. Pt denies any bleeding, or leakage of fluid at this time. States baby is moving good. Patient denies contractions.     Initial /72   Pulse 92   Wt 95.3 kg (210 lb)   LMP 09/28/2022 (Approximate)   BMI 36.05 kg/m   Estimated body mass index is 36.05 kg/m  as calculated from the following:    Height as of 5/23/23: 1.626 m (5' 4\").    Weight as of this encounter: 95.3 kg (210 lb).  Medication Reconciliation: complete          Mariana Madrid LPN  "

## 2023-06-16 ENCOUNTER — MYC MEDICAL ADVICE (OUTPATIENT)
Dept: OBGYN | Facility: OTHER | Age: 23
End: 2023-06-16
Payer: COMMERCIAL

## 2023-06-16 NOTE — TELEPHONE ENCOUNTER
Images and UpToDate recommendations reviewed with JOSE ANTONIO Le CNP. Patient to start weekly BPP with NST for mild polyhydramnios. Patient was contacted by phone and informed of need for additional imaging. She verbalized understanding and agreement.    Iris Ford RN...................6/16/2023 9:40 AM

## 2023-06-16 NOTE — PROGRESS NOTES
CC: Recheck OB visit at 35w6d    HPI: Mya Gant presents for a routine OB visit now at 35w6d  Concerns: Anxious, some cramps  Patient notices normal fetal movement, denies contractions, vaginal bleeding or leaking of fluid.    OB History    Para Term  AB Living   1 0 0 0 0 0   SAB IAB Ectopic Multiple Live Births   0 0 0 0 0      # Outcome Date GA Lbr Landon/2nd Weight Sex Delivery Anes PTL Lv   1 Current              Current Outpatient Medications   Medication     Magnesium Oxide 250 MG TABS     metroNIDAZOLE (FLAGYL) 500 MG tablet     Omega-3 Fatty Acids (SEA-OMEGA PO)     Prenatal Vit-Fe Fumarate-FA (PRENATAL MULTIVITAMIN W/IRON) 27-0.8 MG tablet     Probiotic Product (PROBIOTIC DAILY PO)     No current facility-administered medications for this visit.     O: /72   Pulse 92   Wt 95.3 kg (210 lb)   LMP 2022 (Approximate)   BMI 36.05 kg/m    Body mass index is 36.05 kg/m .  See OB flow sheet  EXAM:  NAD  FH:35 cm  FHT:140 bpm  Cx posterior and high    Results for orders placed or performed during the hospital encounter of 06/15/23   US OB >14 Weeks Follow Up     Status: None    Narrative    OB ULTRASOUND - Transabdominal     Clinical: , Short femur of fetus on prenatal ultrasound.   Gestation:  1  Comparison: 2023  Presentation: Cephalic  Cardiac Activity:  Regular at 149 bpm  Movement:  Yes  Placenta: Anterior   Previa:  No placenta previa    Amniotic Fluid: Polyhydramnios SHELLY: 25.2 cm    Measurements ([place]):  BPD: 29%  HC: 21%  AC: 41%  FL: 5%    Estimated Fetal Weight:  2555 grams  HC/AC:  1.02  US age (current):  35 weeks 1 days  Gestational age:  35 weeks 6 days  US EDC : 35 weeks 6 days  %WT for EGA (preferred dating):  26 %    Structural Survey:  Stomach:  Unremarkable  Kidneys:  Unremarkable  Bladder:  Unremarkable      Impression    IMPRESSION: Estimated fetal weight 25 5 g which is in the 26th  percentile for gestational age of 35 weeks 6  days    Polyhydramnios    The femur length is in the 5th percentile.    RICHARD CABEZAS MD         SYSTEM ID:  [id number]       A/P: (Z34.90) Normal pregnancy, antepartum  (primary encounter diagnosis)  Comment:   Plan: Strep, Group B by PCR          Recheck in 1 weeks    Problem List:   Pregnancy risk factors include:  Palpitations, dyspnea with exercise.  Echo normal with PFO noted.  Declining TDAP  Rhogam given at April visit.      Jose Aviles MD FACOG  7:46 PM 6/15/2023

## 2023-06-17 LAB — GP B STREP DNA SPEC QL NAA+PROBE: NEGATIVE

## 2023-06-19 NOTE — TELEPHONE ENCOUNTER
Phone call to patient regarding MyChart message. Identity verified. Educated patient regarding polyhydramnios and monitoring. Patient verbalizes understanding. Appointment 6/19/23 for follow up.  Olga Lidia Gallagher RN on 6/19/2023 at 2:03 PM

## 2023-06-20 ENCOUNTER — ALLIED HEALTH/NURSE VISIT (OUTPATIENT)
Dept: OBGYN | Facility: OTHER | Age: 23
End: 2023-06-20
Attending: OBSTETRICS & GYNECOLOGY
Payer: COMMERCIAL

## 2023-06-20 ENCOUNTER — HOSPITAL ENCOUNTER (OUTPATIENT)
Dept: ULTRASOUND IMAGING | Facility: OTHER | Age: 23
Discharge: HOME | End: 2023-06-20
Attending: OBSTETRICS & GYNECOLOGY
Payer: COMMERCIAL

## 2023-06-20 DIAGNOSIS — O40.3XX0 POLYHYDRAMNIOS AFFECTING PREGNANCY IN THIRD TRIMESTER: Primary | ICD-10-CM

## 2023-06-20 DIAGNOSIS — O40.3XX0 POLYHYDRAMNIOS AFFECTING PREGNANCY IN THIRD TRIMESTER: ICD-10-CM

## 2023-06-20 PROCEDURE — 76819 FETAL BIOPHYS PROFIL W/O NST: CPT

## 2023-06-20 PROCEDURE — 59025 FETAL NON-STRESS TEST: CPT

## 2023-06-20 NOTE — NURSING NOTE
Patient is here for NST. Denies pain, leaking of fluids, vaginal discharge or bleeding. States no concerns with fetal movements. Denies any contractions. NST strip reactive and appropriate-reviewed by Dr. French. Patient was given ice water to help with NST results.     All patient question answered with no further concerns.    Corinne R Thayer, RN on 6/20/2023 at 2:29 PM

## 2023-06-26 ENCOUNTER — PRENATAL OFFICE VISIT (OUTPATIENT)
Dept: OBGYN | Facility: OTHER | Age: 23
End: 2023-06-26
Attending: OBSTETRICS & GYNECOLOGY
Payer: COMMERCIAL

## 2023-06-26 VITALS
SYSTOLIC BLOOD PRESSURE: 130 MMHG | HEART RATE: 115 BPM | BODY MASS INDEX: 36.9 KG/M2 | WEIGHT: 215 LBS | DIASTOLIC BLOOD PRESSURE: 80 MMHG

## 2023-06-26 DIAGNOSIS — O40.3XX0 POLYHYDRAMNIOS AFFECTING PREGNANCY IN THIRD TRIMESTER: Primary | ICD-10-CM

## 2023-06-26 LAB
ALBUMIN MFR UR ELPH: 8.3 MG/DL
ALBUMIN SERPL BCG-MCNC: 3.5 G/DL (ref 3.5–5.2)
ALP SERPL-CCNC: 150 U/L (ref 35–104)
ALT SERPL W P-5'-P-CCNC: 20 U/L (ref 0–50)
ANION GAP SERPL CALCULATED.3IONS-SCNC: 12 MMOL/L (ref 7–15)
AST SERPL W P-5'-P-CCNC: 21 U/L (ref 0–45)
BASOPHILS # BLD AUTO: 0.1 10E3/UL (ref 0–0.2)
BASOPHILS NFR BLD AUTO: 1 %
BILIRUB SERPL-MCNC: 0.3 MG/DL
BUN SERPL-MCNC: 7.3 MG/DL (ref 6–20)
CALCIUM SERPL-MCNC: 8.5 MG/DL (ref 8.6–10)
CHLORIDE SERPL-SCNC: 100 MMOL/L (ref 98–107)
CREAT SERPL-MCNC: 0.6 MG/DL (ref 0.51–0.95)
CREAT UR-MCNC: 17.1 MG/DL
DEPRECATED HCO3 PLAS-SCNC: 21 MMOL/L (ref 22–29)
EOSINOPHIL # BLD AUTO: 0.1 10E3/UL (ref 0–0.7)
EOSINOPHIL NFR BLD AUTO: 1 %
ERYTHROCYTE [DISTWIDTH] IN BLOOD BY AUTOMATED COUNT: 13 % (ref 10–15)
GFR SERPL CREATININE-BSD FRML MDRD: >90 ML/MIN/1.73M2
GLUCOSE SERPL-MCNC: 79 MG/DL (ref 70–99)
HCT VFR BLD AUTO: 37.8 % (ref 35–47)
HGB BLD-MCNC: 13 G/DL (ref 11.7–15.7)
IMM GRANULOCYTES # BLD: 0.1 10E3/UL
IMM GRANULOCYTES NFR BLD: 1 %
LYMPHOCYTES # BLD AUTO: 2.7 10E3/UL (ref 0.8–5.3)
LYMPHOCYTES NFR BLD AUTO: 27 %
MCH RBC QN AUTO: 29.7 PG (ref 26.5–33)
MCHC RBC AUTO-ENTMCNC: 34.4 G/DL (ref 31.5–36.5)
MCV RBC AUTO: 87 FL (ref 78–100)
MONOCYTES # BLD AUTO: 0.9 10E3/UL (ref 0–1.3)
MONOCYTES NFR BLD AUTO: 9 %
NEUTROPHILS # BLD AUTO: 6 10E3/UL (ref 1.6–8.3)
NEUTROPHILS NFR BLD AUTO: 61 %
NRBC # BLD AUTO: 0 10E3/UL
NRBC BLD AUTO-RTO: 0 /100
PLATELET # BLD AUTO: 266 10E3/UL (ref 150–450)
POTASSIUM SERPL-SCNC: 4.1 MMOL/L (ref 3.4–5.3)
PROT SERPL-MCNC: 6.1 G/DL (ref 6.4–8.3)
PROT/CREAT 24H UR: 0.49 MG/MG CR (ref 0–0.2)
RBC # BLD AUTO: 4.37 10E6/UL (ref 3.8–5.2)
SODIUM SERPL-SCNC: 133 MMOL/L (ref 136–145)
WBC # BLD AUTO: 10 10E3/UL (ref 4–11)

## 2023-06-26 PROCEDURE — 36415 COLL VENOUS BLD VENIPUNCTURE: CPT | Mod: ZL | Performed by: OBSTETRICS & GYNECOLOGY

## 2023-06-26 PROCEDURE — 99207 PR OB VISIT-NO CHARGE - GICH ONLY: CPT | Performed by: OBSTETRICS & GYNECOLOGY

## 2023-06-26 PROCEDURE — 80053 COMPREHEN METABOLIC PANEL: CPT | Mod: ZL | Performed by: OBSTETRICS & GYNECOLOGY

## 2023-06-26 PROCEDURE — 85025 COMPLETE CBC W/AUTO DIFF WBC: CPT | Mod: ZL | Performed by: OBSTETRICS & GYNECOLOGY

## 2023-06-26 PROCEDURE — 84156 ASSAY OF PROTEIN URINE: CPT | Mod: ZL | Performed by: OBSTETRICS & GYNECOLOGY

## 2023-06-26 PROCEDURE — 59025 FETAL NON-STRESS TEST: CPT | Performed by: OBSTETRICS & GYNECOLOGY

## 2023-06-26 RX ORDER — OXYTOCIN/0.9 % SODIUM CHLORIDE 30/500 ML
100-340 PLASTIC BAG, INJECTION (ML) INTRAVENOUS CONTINUOUS PRN
Status: CANCELLED | OUTPATIENT
Start: 2023-06-26

## 2023-06-26 RX ORDER — MISOPROSTOL 100 UG/1
400 TABLET ORAL
Status: CANCELLED | OUTPATIENT
Start: 2023-06-26

## 2023-06-26 RX ORDER — IBUPROFEN 200 MG
800 TABLET ORAL
Status: CANCELLED | OUTPATIENT
Start: 2023-06-26 | End: 2023-07-01

## 2023-06-26 RX ORDER — OXYTOCIN 10 [USP'U]/ML
10 INJECTION, SOLUTION INTRAMUSCULAR; INTRAVENOUS
Status: CANCELLED | OUTPATIENT
Start: 2023-06-26

## 2023-06-26 RX ORDER — ONDANSETRON 4 MG/1
4 TABLET, ORALLY DISINTEGRATING ORAL EVERY 6 HOURS PRN
Status: CANCELLED | OUTPATIENT
Start: 2023-06-26

## 2023-06-26 RX ORDER — METOCLOPRAMIDE HYDROCHLORIDE 5 MG/ML
10 INJECTION INTRAMUSCULAR; INTRAVENOUS EVERY 6 HOURS PRN
Status: CANCELLED | OUTPATIENT
Start: 2023-06-26

## 2023-06-26 RX ORDER — ACETAMINOPHEN 325 MG/1
650 TABLET ORAL EVERY 4 HOURS PRN
Status: CANCELLED | OUTPATIENT
Start: 2023-06-26

## 2023-06-26 RX ORDER — CITRIC ACID/SODIUM CITRATE 334-500MG
30 SOLUTION, ORAL ORAL
Status: CANCELLED | OUTPATIENT
Start: 2023-06-26

## 2023-06-26 RX ORDER — KETOROLAC TROMETHAMINE 30 MG/ML
30 INJECTION, SOLUTION INTRAMUSCULAR; INTRAVENOUS
Status: CANCELLED | OUTPATIENT
Start: 2023-06-26 | End: 2023-07-01

## 2023-06-26 RX ORDER — SODIUM CHLORIDE, SODIUM LACTATE, POTASSIUM CHLORIDE, CALCIUM CHLORIDE 600; 310; 30; 20 MG/100ML; MG/100ML; MG/100ML; MG/100ML
INJECTION, SOLUTION INTRAVENOUS CONTINUOUS
Status: CANCELLED | OUTPATIENT
Start: 2023-06-26

## 2023-06-26 RX ORDER — NALOXONE HYDROCHLORIDE 0.4 MG/ML
0.4 INJECTION, SOLUTION INTRAMUSCULAR; INTRAVENOUS; SUBCUTANEOUS
Status: CANCELLED | OUTPATIENT
Start: 2023-06-26

## 2023-06-26 RX ORDER — FENTANYL CITRATE 50 UG/ML
100 INJECTION, SOLUTION INTRAMUSCULAR; INTRAVENOUS
Status: CANCELLED | OUTPATIENT
Start: 2023-06-26

## 2023-06-26 RX ORDER — MISOPROSTOL 100 UG/1
25 TABLET ORAL EVERY 4 HOURS PRN
Status: CANCELLED | OUTPATIENT
Start: 2023-06-26

## 2023-06-26 RX ORDER — NALOXONE HYDROCHLORIDE 0.4 MG/ML
0.2 INJECTION, SOLUTION INTRAMUSCULAR; INTRAVENOUS; SUBCUTANEOUS
Status: CANCELLED | OUTPATIENT
Start: 2023-06-26

## 2023-06-26 RX ORDER — METOCLOPRAMIDE 10 MG/1
10 TABLET ORAL EVERY 6 HOURS PRN
Status: CANCELLED | OUTPATIENT
Start: 2023-06-26

## 2023-06-26 RX ORDER — PROCHLORPERAZINE MALEATE 10 MG
10 TABLET ORAL EVERY 6 HOURS PRN
Status: CANCELLED | OUTPATIENT
Start: 2023-06-26

## 2023-06-26 RX ORDER — PROCHLORPERAZINE 25 MG
25 SUPPOSITORY, RECTAL RECTAL EVERY 12 HOURS PRN
Status: CANCELLED | OUTPATIENT
Start: 2023-06-26

## 2023-06-26 RX ORDER — ONDANSETRON 2 MG/ML
4 INJECTION INTRAMUSCULAR; INTRAVENOUS EVERY 6 HOURS PRN
Status: CANCELLED | OUTPATIENT
Start: 2023-06-26

## 2023-06-26 RX ORDER — TRANEXAMIC ACID 10 MG/ML
1 INJECTION, SOLUTION INTRAVENOUS EVERY 30 MIN PRN
Status: CANCELLED | OUTPATIENT
Start: 2023-06-26

## 2023-06-26 RX ORDER — METHYLERGONOVINE MALEATE 0.2 MG/ML
200 INJECTION INTRAVENOUS
Status: CANCELLED | OUTPATIENT
Start: 2023-06-26

## 2023-06-26 RX ORDER — CARBOPROST TROMETHAMINE 250 UG/ML
250 INJECTION, SOLUTION INTRAMUSCULAR
Status: CANCELLED | OUTPATIENT
Start: 2023-06-26

## 2023-06-26 RX ORDER — OXYTOCIN/0.9 % SODIUM CHLORIDE 30/500 ML
340 PLASTIC BAG, INJECTION (ML) INTRAVENOUS CONTINUOUS PRN
Status: CANCELLED | OUTPATIENT
Start: 2023-06-26

## 2023-06-26 NOTE — NURSING NOTE
"Chief Complaint   Patient presents with     Prenatal Care     37 w 3 d     Pt presents to clinic today for prenatal care 37 W 3 D. Pt denies any bleeding, or leakage of fluid at this time. States baby is moving good. Patient C/O contractions.   Initial /80   Pulse 115   Wt 97.5 kg (215 lb)   LMP 09/28/2022 (Approximate)   BMI 36.90 kg/m   Estimated body mass index is 36.9 kg/m  as calculated from the following:    Height as of 5/23/23: 1.626 m (5' 4\").    Weight as of this encounter: 97.5 kg (215 lb).  Medication Reconciliation: complete          Mariana Madrid LPN  "

## 2023-06-26 NOTE — PROGRESS NOTES
CC: Recheck OB visit at 37w3d    HPI: Mya Gant presents for a routine OB visit now at 37w3d  Concerns: Borderline hydramnios on last few US reports  Patient notices normal fetal movement, denies contractions, vaginal bleeding or leaking of fluid.    OB History    Para Term  AB Living   1 0 0 0 0 0   SAB IAB Ectopic Multiple Live Births   0 0 0 0 0      # Outcome Date GA Lbr Landon/2nd Weight Sex Delivery Anes PTL Lv   1 Current              Current Outpatient Medications   Medication     Magnesium Oxide 250 MG TABS     Omega-3 Fatty Acids (SEA-OMEGA PO)     Prenatal Vit-Fe Fumarate-FA (PRENATAL MULTIVITAMIN W/IRON) 27-0.8 MG tablet     Probiotic Product (PROBIOTIC DAILY PO)     No current facility-administered medications for this visit.         O: /80   Pulse 115   Wt 97.5 kg (215 lb)   LMP 2022 (Approximate)   BMI 36.90 kg/m    Body mass index is 36.9 kg/m .  See OB flow sheet  EXAM:  NAD    NST doucmentation:    Indication: (O40.3XX0) Polyhydramnios affecting pregnancy in third trimester  (primary encounter diagnosis)  Comment:   Plan: FETAL NON-STRESS TEST, Protein  random urine,         Comprehensive Metabolic Panel, CBC and         Differential, CANCELED: Glucose            Duration: 30 min     37w3d  FHR baseline: 145 with moderate variability  Accelerations: y  Decelerations: n  Contractions: y    Category 1    Cx FT, posterior, soft, high      Results for orders placed or performed in visit on 23   CBC and Differential     Status: None ()    Narrative    The following orders were created for panel order CBC and Differential.  Procedure                               Abnormality         Status                     ---------                               -----------         ------                     CBC with platelets and d...[[id number]]                                                   Please view results for these tests on the individual orders.       A/P: (O40.3XX0)  Polyhydramnios affecting pregnancy in third trimester  (primary encounter diagnosis)  Comment:   Plan: FETAL NON-STRESS TEST, Protein  random urine,         Comprehensive Metabolic Panel, CBC and         Differential, CANCELED: Glucose            Plan induction next week Thursday night for polyhydramnios.  Continue weekly BPP and NST's until delivery    Problem List:   Pregnancy risk factors include:  Palpitations, dyspnea with exercise.  Echo normal with PFO noted.  Hydramnios    Jose Aviles MD FACOG  4:36 PM 6/26/2023

## 2023-06-27 ENCOUNTER — TELEPHONE (OUTPATIENT)
Dept: OBGYN | Facility: OTHER | Age: 23
End: 2023-06-27
Payer: COMMERCIAL

## 2023-06-27 ENCOUNTER — HOSPITAL ENCOUNTER (OUTPATIENT)
Dept: ULTRASOUND IMAGING | Facility: OTHER | Age: 23
Discharge: HOME | End: 2023-06-27
Attending: OBSTETRICS & GYNECOLOGY | Admitting: OBSTETRICS & GYNECOLOGY
Payer: COMMERCIAL

## 2023-06-27 DIAGNOSIS — O40.3XX0 POLYHYDRAMNIOS AFFECTING PREGNANCY IN THIRD TRIMESTER: ICD-10-CM

## 2023-06-27 PROCEDURE — 76819 FETAL BIOPHYS PROFIL W/O NST: CPT

## 2023-06-27 NOTE — TELEPHONE ENCOUNTER
Patient called and has questions regarding her lab results. Please contact patient.    Ambar Schroeder on 6/27/2023 at 11:14 AM

## 2023-06-27 NOTE — TELEPHONE ENCOUNTER
Spoke with patient and answered all questions in regards to labs. No further questions at end of phone call    Corinne R Thayer, RN on 6/27/2023 at 11:38 AM

## 2023-06-29 ENCOUNTER — ALLIED HEALTH/NURSE VISIT (OUTPATIENT)
Dept: OBGYN | Facility: OTHER | Age: 23
End: 2023-06-29
Attending: OBSTETRICS & GYNECOLOGY
Payer: COMMERCIAL

## 2023-06-29 VITALS — SYSTOLIC BLOOD PRESSURE: 125 MMHG | DIASTOLIC BLOOD PRESSURE: 82 MMHG

## 2023-06-29 DIAGNOSIS — Z01.30 BLOOD PRESSURE CHECK: Primary | ICD-10-CM

## 2023-06-29 ASSESSMENT — PAIN SCALES - GENERAL: PAINLEVEL: MODERATE PAIN (5)

## 2023-06-29 NOTE — NURSING NOTE
Patient is here for a blood pressure check. Reports generalized abdominal pain as a 5 on a 1-10 scale.Blood pressures: 125/82 and 120/78. Patient denies right upper quadrant pain, headache and vision changes. Denies vaginal discharge and bleeding. Reports normal fetal movement. Educated on signs and symptoms of preeclampsia. Verbalizes understanding denies questions. Dr. Aviles notified of blood pressure readings. No labs today. Will reassess at next appointment.   Olga Lidia Gallagher RN on 6/29/2023 at 9:17 AM

## 2023-07-03 ENCOUNTER — PRENATAL OFFICE VISIT (OUTPATIENT)
Dept: OBGYN | Facility: OTHER | Age: 23
End: 2023-07-03
Attending: OBSTETRICS & GYNECOLOGY
Payer: COMMERCIAL

## 2023-07-03 ENCOUNTER — HOSPITAL ENCOUNTER (OUTPATIENT)
Dept: ULTRASOUND IMAGING | Facility: OTHER | Age: 23
Discharge: HOME | End: 2023-07-03
Attending: OBSTETRICS & GYNECOLOGY
Payer: COMMERCIAL

## 2023-07-03 VITALS
BODY MASS INDEX: 37.76 KG/M2 | HEART RATE: 102 BPM | SYSTOLIC BLOOD PRESSURE: 122 MMHG | DIASTOLIC BLOOD PRESSURE: 86 MMHG | WEIGHT: 220 LBS

## 2023-07-03 DIAGNOSIS — O40.3XX0 POLYHYDRAMNIOS AFFECTING PREGNANCY IN THIRD TRIMESTER: ICD-10-CM

## 2023-07-03 DIAGNOSIS — O40.3XX0 POLYHYDRAMNIOS AFFECTING PREGNANCY IN THIRD TRIMESTER: Primary | ICD-10-CM

## 2023-07-03 PROCEDURE — 76819 FETAL BIOPHYS PROFIL W/O NST: CPT

## 2023-07-03 PROCEDURE — 99207 PR OB VISIT-NO CHARGE - GICH ONLY: CPT | Performed by: OBSTETRICS & GYNECOLOGY

## 2023-07-03 NOTE — NURSING NOTE
Pt presents to clinic today for prenatal care 38w2d. Pt denies any contractions, bleeding, or leakage of fluid at this time. States baby is moving good.      Medication Reconciliation: complete  Alexandra Mendez LPN

## 2023-07-03 NOTE — PROGRESS NOTES
Problem List:   Pregnancy risk factors include:  Palpitations, dyspnea with exercise.  Echo normal with PFO noted.  Hydramnios    CC: Recheck OB visit at 38w3d    HPI: Mya Gant presents for a routine OB visit now at 38w3d  Concerns: edema and protein in urine with normal BP  Patient notices normal fetal movement, denies contractions, vaginal bleeding or leaking of fluid.    OB History    Para Term  AB Living   1 0 0 0 0 0   SAB IAB Ectopic Multiple Live Births   0 0 0 0 0      # Outcome Date GA Lbr Landon/2nd Weight Sex Delivery Anes PTL Lv   1 Current              Current Outpatient Medications   Medication     Magnesium Oxide 250 MG TABS     Omega-3 Fatty Acids (SEA-OMEGA PO)     Prenatal Vit-Fe Fumarate-FA (PRENATAL MULTIVITAMIN W/IRON) 27-0.8 MG tablet     Probiotic Product (PROBIOTIC DAILY PO)     No current facility-administered medications for this visit.         O: /86   Pulse 102   Wt 99.8 kg (220 lb)   LMP 2022 (Approximate)   BMI 37.76 kg/m    Body mass index is 37.76 kg/m .  See OB flow sheet  EXAM:  NAD    NST doucmentation:    Indication: No diagnosis found.  Duration: 30 min     38w3d  FHR baseline: 120 with moderate variability  Accelerations: y  Decelerations: n  Contractions: n    Category 1    Cx FT, soft, posterior, difficult exam      No results found for any visits on 23.    A/P: 38w3d planning ripening on  with cytotec for hydramnios  Orders placed and signed and held pending admission.    Jose Aviles MD FACOG  4:47 PM 7/3/2023

## 2023-07-06 ENCOUNTER — HOSPITAL ENCOUNTER (INPATIENT)
Facility: OTHER | Age: 23
LOS: 3 days | Discharge: HOME | End: 2023-07-09
Attending: OBSTETRICS & GYNECOLOGY | Admitting: OBSTETRICS & GYNECOLOGY
Payer: COMMERCIAL

## 2023-07-06 DIAGNOSIS — O40.3XX0 POLYHYDRAMNIOS AFFECTING PREGNANCY IN THIRD TRIMESTER: ICD-10-CM

## 2023-07-06 LAB
ABO/RH(D): ABNORMAL
ANTIBODY ID: NORMAL
ANTIBODY SCREEN: POSITIVE
ERYTHROCYTE [DISTWIDTH] IN BLOOD BY AUTOMATED COUNT: 13.2 % (ref 10–15)
HCT VFR BLD AUTO: 36.1 % (ref 35–47)
HGB BLD-MCNC: 12.6 G/DL (ref 11.7–15.7)
MCH RBC QN AUTO: 29.9 PG (ref 26.5–33)
MCHC RBC AUTO-ENTMCNC: 34.9 G/DL (ref 31.5–36.5)
MCV RBC AUTO: 86 FL (ref 78–100)
PLATELET # BLD AUTO: 234 10E3/UL (ref 150–450)
RBC # BLD AUTO: 4.21 10E6/UL (ref 3.8–5.2)
SPECIMEN EXPIRATION DATE: ABNORMAL
SPECIMEN EXPIRATION DATE: NORMAL
WBC # BLD AUTO: 11.9 10E3/UL (ref 4–11)

## 2023-07-06 PROCEDURE — 85027 COMPLETE CBC AUTOMATED: CPT | Performed by: OBSTETRICS & GYNECOLOGY

## 2023-07-06 PROCEDURE — 86870 RBC ANTIBODY IDENTIFICATION: CPT | Performed by: OBSTETRICS & GYNECOLOGY

## 2023-07-06 PROCEDURE — 86780 TREPONEMA PALLIDUM: CPT | Performed by: OBSTETRICS & GYNECOLOGY

## 2023-07-06 PROCEDURE — 86850 RBC ANTIBODY SCREEN: CPT | Performed by: OBSTETRICS & GYNECOLOGY

## 2023-07-06 PROCEDURE — 250N000013 HC RX MED GY IP 250 OP 250 PS 637: Performed by: OBSTETRICS & GYNECOLOGY

## 2023-07-06 PROCEDURE — 120N000001 HC R&B MED SURG/OB

## 2023-07-06 PROCEDURE — 86901 BLOOD TYPING SEROLOGIC RH(D): CPT | Performed by: OBSTETRICS & GYNECOLOGY

## 2023-07-06 PROCEDURE — 36415 COLL VENOUS BLD VENIPUNCTURE: CPT | Performed by: OBSTETRICS & GYNECOLOGY

## 2023-07-06 PROCEDURE — 3E0P7GC INTRODUCTION OF OTHER THERAPEUTIC SUBSTANCE INTO FEMALE REPRODUCTIVE, VIA NATURAL OR ARTIFICIAL OPENING: ICD-10-PCS | Performed by: OBSTETRICS & GYNECOLOGY

## 2023-07-06 RX ORDER — PROCHLORPERAZINE 25 MG
25 SUPPOSITORY, RECTAL RECTAL EVERY 12 HOURS PRN
Status: DISCONTINUED | OUTPATIENT
Start: 2023-07-06 | End: 2023-07-07 | Stop reason: HOSPADM

## 2023-07-06 RX ORDER — OXYTOCIN/0.9 % SODIUM CHLORIDE 30/500 ML
340 PLASTIC BAG, INJECTION (ML) INTRAVENOUS CONTINUOUS PRN
Status: DISCONTINUED | OUTPATIENT
Start: 2023-07-06 | End: 2023-07-07 | Stop reason: HOSPADM

## 2023-07-06 RX ORDER — SODIUM CHLORIDE, SODIUM LACTATE, POTASSIUM CHLORIDE, CALCIUM CHLORIDE 600; 310; 30; 20 MG/100ML; MG/100ML; MG/100ML; MG/100ML
INJECTION, SOLUTION INTRAVENOUS CONTINUOUS
Status: DISCONTINUED | OUTPATIENT
Start: 2023-07-06 | End: 2023-07-07 | Stop reason: HOSPADM

## 2023-07-06 RX ORDER — NALOXONE HYDROCHLORIDE 0.4 MG/ML
0.2 INJECTION, SOLUTION INTRAMUSCULAR; INTRAVENOUS; SUBCUTANEOUS
Status: DISCONTINUED | OUTPATIENT
Start: 2023-07-06 | End: 2023-07-07 | Stop reason: HOSPADM

## 2023-07-06 RX ORDER — IBUPROFEN 400 MG/1
800 TABLET, FILM COATED ORAL
Status: DISCONTINUED | OUTPATIENT
Start: 2023-07-06 | End: 2023-07-07

## 2023-07-06 RX ORDER — METOCLOPRAMIDE 10 MG/1
10 TABLET ORAL EVERY 6 HOURS PRN
Status: DISCONTINUED | OUTPATIENT
Start: 2023-07-06 | End: 2023-07-07 | Stop reason: HOSPADM

## 2023-07-06 RX ORDER — OXYTOCIN/0.9 % SODIUM CHLORIDE 30/500 ML
100-340 PLASTIC BAG, INJECTION (ML) INTRAVENOUS CONTINUOUS PRN
Status: DISCONTINUED | OUTPATIENT
Start: 2023-07-06 | End: 2023-07-07

## 2023-07-06 RX ORDER — FENTANYL CITRATE 50 UG/ML
100 INJECTION, SOLUTION INTRAMUSCULAR; INTRAVENOUS
Status: DISCONTINUED | OUTPATIENT
Start: 2023-07-06 | End: 2023-07-07 | Stop reason: HOSPADM

## 2023-07-06 RX ORDER — CITRIC ACID/SODIUM CITRATE 334-500MG
30 SOLUTION, ORAL ORAL
Status: DISCONTINUED | OUTPATIENT
Start: 2023-07-06 | End: 2023-07-07 | Stop reason: HOSPADM

## 2023-07-06 RX ORDER — NALOXONE HYDROCHLORIDE 0.4 MG/ML
0.4 INJECTION, SOLUTION INTRAMUSCULAR; INTRAVENOUS; SUBCUTANEOUS
Status: DISCONTINUED | OUTPATIENT
Start: 2023-07-06 | End: 2023-07-07 | Stop reason: HOSPADM

## 2023-07-06 RX ORDER — MISOPROSTOL 100 UG/1
400 TABLET ORAL
Status: DISCONTINUED | OUTPATIENT
Start: 2023-07-06 | End: 2023-07-07 | Stop reason: HOSPADM

## 2023-07-06 RX ORDER — METHYLERGONOVINE MALEATE 0.2 MG/ML
200 INJECTION INTRAVENOUS
Status: DISCONTINUED | OUTPATIENT
Start: 2023-07-06 | End: 2023-07-07 | Stop reason: HOSPADM

## 2023-07-06 RX ORDER — METOCLOPRAMIDE HYDROCHLORIDE 5 MG/ML
10 INJECTION INTRAMUSCULAR; INTRAVENOUS EVERY 6 HOURS PRN
Status: DISCONTINUED | OUTPATIENT
Start: 2023-07-06 | End: 2023-07-07 | Stop reason: HOSPADM

## 2023-07-06 RX ORDER — OXYTOCIN 10 [USP'U]/ML
10 INJECTION, SOLUTION INTRAMUSCULAR; INTRAVENOUS
Status: DISCONTINUED | OUTPATIENT
Start: 2023-07-06 | End: 2023-07-07

## 2023-07-06 RX ORDER — TRANEXAMIC ACID 10 MG/ML
1 INJECTION, SOLUTION INTRAVENOUS EVERY 30 MIN PRN
Status: DISCONTINUED | OUTPATIENT
Start: 2023-07-06 | End: 2023-07-07 | Stop reason: HOSPADM

## 2023-07-06 RX ORDER — ACETAMINOPHEN 325 MG/1
650 TABLET ORAL EVERY 4 HOURS PRN
Status: DISCONTINUED | OUTPATIENT
Start: 2023-07-06 | End: 2023-07-07 | Stop reason: HOSPADM

## 2023-07-06 RX ORDER — KETOROLAC TROMETHAMINE 30 MG/ML
30 INJECTION, SOLUTION INTRAMUSCULAR; INTRAVENOUS
Status: DISCONTINUED | OUTPATIENT
Start: 2023-07-06 | End: 2023-07-07

## 2023-07-06 RX ORDER — ONDANSETRON 4 MG/1
4 TABLET, ORALLY DISINTEGRATING ORAL EVERY 6 HOURS PRN
Status: DISCONTINUED | OUTPATIENT
Start: 2023-07-06 | End: 2023-07-07 | Stop reason: HOSPADM

## 2023-07-06 RX ORDER — PROCHLORPERAZINE MALEATE 10 MG
10 TABLET ORAL EVERY 6 HOURS PRN
Status: DISCONTINUED | OUTPATIENT
Start: 2023-07-06 | End: 2023-07-07 | Stop reason: HOSPADM

## 2023-07-06 RX ORDER — ONDANSETRON 2 MG/ML
4 INJECTION INTRAMUSCULAR; INTRAVENOUS EVERY 6 HOURS PRN
Status: DISCONTINUED | OUTPATIENT
Start: 2023-07-06 | End: 2023-07-07 | Stop reason: HOSPADM

## 2023-07-06 RX ORDER — CARBOPROST TROMETHAMINE 250 UG/ML
250 INJECTION, SOLUTION INTRAMUSCULAR
Status: DISCONTINUED | OUTPATIENT
Start: 2023-07-06 | End: 2023-07-07 | Stop reason: HOSPADM

## 2023-07-06 RX ORDER — MISOPROSTOL 100 UG/1
25 TABLET ORAL EVERY 4 HOURS PRN
Status: DISCONTINUED | OUTPATIENT
Start: 2023-07-06 | End: 2023-07-07 | Stop reason: HOSPADM

## 2023-07-06 RX ORDER — OXYTOCIN 10 [USP'U]/ML
10 INJECTION, SOLUTION INTRAMUSCULAR; INTRAVENOUS
Status: DISCONTINUED | OUTPATIENT
Start: 2023-07-06 | End: 2023-07-07 | Stop reason: HOSPADM

## 2023-07-06 RX ADMIN — Medication 25 MCG: at 22:35

## 2023-07-06 RX ADMIN — Medication 25 MCG: at 18:34

## 2023-07-06 ASSESSMENT — ACTIVITIES OF DAILY LIVING (ADL)
ADLS_ACUITY_SCORE: 35
ADLS_ACUITY_SCORE: 18

## 2023-07-06 NOTE — PROGRESS NOTES
Patient to [facility] with  for cervical ripening. To room 403. Oriented to room and nurse call light. Gabi Dukes RN on 7/6/2023 at 4:52 PM

## 2023-07-06 NOTE — PROGRESS NOTES
Admitted patient to [facility], saline lock placed, and first dose of Cytotec placed after oriented to procedure. Gabi Dukes RN on 7/6/2023 at 6:47 PM

## 2023-07-07 ENCOUNTER — ANESTHESIA EVENT (OUTPATIENT)
Dept: OBGYN | Facility: OTHER | Age: 23
End: 2023-07-07
Payer: COMMERCIAL

## 2023-07-07 ENCOUNTER — ANESTHESIA (OUTPATIENT)
Dept: OBGYN | Facility: OTHER | Age: 23
End: 2023-07-07
Payer: COMMERCIAL

## 2023-07-07 LAB — T PALLIDUM AB SER QL: NONREACTIVE

## 2023-07-07 PROCEDURE — 250N000011 HC RX IP 250 OP 636: Mod: JZ | Performed by: OBSTETRICS & GYNECOLOGY

## 2023-07-07 PROCEDURE — 120N000001 HC R&B MED SURG/OB

## 2023-07-07 PROCEDURE — 59400 OBSTETRICAL CARE: CPT | Performed by: OBSTETRICS & GYNECOLOGY

## 2023-07-07 PROCEDURE — 250N000011 HC RX IP 250 OP 636: Performed by: NURSE ANESTHETIST, CERTIFIED REGISTERED

## 2023-07-07 PROCEDURE — 250N000009 HC RX 250: Performed by: NURSE ANESTHETIST, CERTIFIED REGISTERED

## 2023-07-07 PROCEDURE — 258N000003 HC RX IP 258 OP 636: Performed by: OBSTETRICS & GYNECOLOGY

## 2023-07-07 PROCEDURE — 59400 OBSTETRICAL CARE: CPT | Performed by: NURSE ANESTHETIST, CERTIFIED REGISTERED

## 2023-07-07 PROCEDURE — 370N000003 HC ANESTHESIA WARD SERVICE

## 2023-07-07 PROCEDURE — 250N000013 HC RX MED GY IP 250 OP 250 PS 637: Performed by: OBSTETRICS & GYNECOLOGY

## 2023-07-07 PROCEDURE — 722N000001 HC LABOR CARE VAGINAL DELIVERY SINGLE

## 2023-07-07 RX ORDER — OXYTOCIN/0.9 % SODIUM CHLORIDE 30/500 ML
340 PLASTIC BAG, INJECTION (ML) INTRAVENOUS CONTINUOUS PRN
Status: DISCONTINUED | OUTPATIENT
Start: 2023-07-07 | End: 2023-07-09 | Stop reason: HOSPADM

## 2023-07-07 RX ORDER — LIDOCAINE HYDROCHLORIDE AND EPINEPHRINE 15; 5 MG/ML; UG/ML
INJECTION, SOLUTION EPIDURAL PRN
Status: DISCONTINUED | OUTPATIENT
Start: 2023-07-07 | End: 2023-07-07

## 2023-07-07 RX ORDER — MISOPROSTOL 100 UG/1
400 TABLET ORAL
Status: DISCONTINUED | OUTPATIENT
Start: 2023-07-07 | End: 2023-07-09 | Stop reason: HOSPADM

## 2023-07-07 RX ORDER — SODIUM CHLORIDE, SODIUM LACTATE, POTASSIUM CHLORIDE, CALCIUM CHLORIDE 600; 310; 30; 20 MG/100ML; MG/100ML; MG/100ML; MG/100ML
INJECTION, SOLUTION INTRAVENOUS CONTINUOUS PRN
Status: DISCONTINUED | OUTPATIENT
Start: 2023-07-07 | End: 2023-07-07 | Stop reason: HOSPADM

## 2023-07-07 RX ORDER — OXYTOCIN/0.9 % SODIUM CHLORIDE 30/500 ML
1-24 PLASTIC BAG, INJECTION (ML) INTRAVENOUS CONTINUOUS
Status: DISCONTINUED | OUTPATIENT
Start: 2023-07-07 | End: 2023-07-07 | Stop reason: HOSPADM

## 2023-07-07 RX ORDER — LIDOCAINE 40 MG/G
CREAM TOPICAL
Status: DISCONTINUED | OUTPATIENT
Start: 2023-07-07 | End: 2023-07-07 | Stop reason: HOSPADM

## 2023-07-07 RX ORDER — HYDROXYZINE PAMOATE 25 MG/1
50 CAPSULE ORAL ONCE
Status: COMPLETED | OUTPATIENT
Start: 2023-07-07 | End: 2023-07-07

## 2023-07-07 RX ORDER — OXYTOCIN 10 [USP'U]/ML
10 INJECTION, SOLUTION INTRAMUSCULAR; INTRAVENOUS
Status: DISCONTINUED | OUTPATIENT
Start: 2023-07-07 | End: 2023-07-09 | Stop reason: HOSPADM

## 2023-07-07 RX ORDER — MODIFIED LANOLIN
OINTMENT (GRAM) TOPICAL
Status: DISCONTINUED | OUTPATIENT
Start: 2023-07-07 | End: 2023-07-09 | Stop reason: HOSPADM

## 2023-07-07 RX ORDER — PRENATAL VIT/IRON FUM/FOLIC AC 27MG-0.8MG
1 TABLET ORAL DAILY
Status: DISCONTINUED | OUTPATIENT
Start: 2023-07-07 | End: 2023-07-09 | Stop reason: HOSPADM

## 2023-07-07 RX ORDER — DOCUSATE SODIUM 100 MG/1
100 CAPSULE, LIQUID FILLED ORAL DAILY
Status: DISCONTINUED | OUTPATIENT
Start: 2023-07-07 | End: 2023-07-09 | Stop reason: HOSPADM

## 2023-07-07 RX ORDER — METHYLERGONOVINE MALEATE 0.2 MG/ML
200 INJECTION INTRAVENOUS
Status: DISCONTINUED | OUTPATIENT
Start: 2023-07-07 | End: 2023-07-09 | Stop reason: HOSPADM

## 2023-07-07 RX ORDER — LIDOCAINE HYDROCHLORIDE 10 MG/ML
INJECTION, SOLUTION EPIDURAL; INFILTRATION; INTRACAUDAL; PERINEURAL PRN
Status: DISCONTINUED | OUTPATIENT
Start: 2023-07-07 | End: 2023-07-07

## 2023-07-07 RX ORDER — IBUPROFEN 400 MG/1
800 TABLET, FILM COATED ORAL EVERY 6 HOURS PRN
Status: DISCONTINUED | OUTPATIENT
Start: 2023-07-07 | End: 2023-07-09 | Stop reason: HOSPADM

## 2023-07-07 RX ORDER — TRANEXAMIC ACID 10 MG/ML
1 INJECTION, SOLUTION INTRAVENOUS EVERY 30 MIN PRN
Status: DISCONTINUED | OUTPATIENT
Start: 2023-07-07 | End: 2023-07-09 | Stop reason: HOSPADM

## 2023-07-07 RX ORDER — FENTANYL CITRATE-0.9 % NACL/PF 10 MCG/ML
100 PLASTIC BAG, INJECTION (ML) INTRAVENOUS EVERY 5 MIN PRN
Status: DISCONTINUED | OUTPATIENT
Start: 2023-07-07 | End: 2023-07-07 | Stop reason: HOSPADM

## 2023-07-07 RX ORDER — BISACODYL 10 MG
10 SUPPOSITORY, RECTAL RECTAL DAILY PRN
Status: DISCONTINUED | OUTPATIENT
Start: 2023-07-07 | End: 2023-07-09 | Stop reason: HOSPADM

## 2023-07-07 RX ORDER — NALBUPHINE HYDROCHLORIDE 10 MG/ML
2.5-5 INJECTION, SOLUTION INTRAMUSCULAR; INTRAVENOUS; SUBCUTANEOUS EVERY 6 HOURS PRN
Status: DISCONTINUED | OUTPATIENT
Start: 2023-07-07 | End: 2023-07-07

## 2023-07-07 RX ORDER — ACETAMINOPHEN 325 MG/1
650 TABLET ORAL EVERY 4 HOURS PRN
Status: DISCONTINUED | OUTPATIENT
Start: 2023-07-07 | End: 2023-07-09 | Stop reason: HOSPADM

## 2023-07-07 RX ORDER — HYDROCORTISONE 25 MG/G
CREAM TOPICAL 3 TIMES DAILY PRN
Status: DISCONTINUED | OUTPATIENT
Start: 2023-07-07 | End: 2023-07-09 | Stop reason: HOSPADM

## 2023-07-07 RX ORDER — CARBOPROST TROMETHAMINE 250 UG/ML
250 INJECTION, SOLUTION INTRAMUSCULAR
Status: DISCONTINUED | OUTPATIENT
Start: 2023-07-07 | End: 2023-07-09 | Stop reason: HOSPADM

## 2023-07-07 RX ADMIN — LIDOCAINE HYDROCHLORIDE 5 ML: 10 INJECTION, SOLUTION EPIDURAL; INFILTRATION; INTRACAUDAL; PERINEURAL at 07:31

## 2023-07-07 RX ADMIN — BENZOCAINE AND LEVOMENTHOL: 200; 5 SPRAY TOPICAL at 20:51

## 2023-07-07 RX ADMIN — HYDROXYZINE PAMOATE 50 MG: 25 CAPSULE ORAL at 11:13

## 2023-07-07 RX ADMIN — SODIUM CHLORIDE, SODIUM LACTATE, POTASSIUM CHLORIDE, AND CALCIUM CHLORIDE 1000 ML: 600; 310; 30; 20 INJECTION, SOLUTION INTRAVENOUS at 06:30

## 2023-07-07 RX ADMIN — Medication 25 MCG: at 03:04

## 2023-07-07 RX ADMIN — Medication 100 MCG: at 10:48

## 2023-07-07 RX ADMIN — PRENATAL VIT W/ FE FUMARATE-FA TAB 27-0.8 MG 1 TABLET: 27-0.8 TAB at 20:51

## 2023-07-07 RX ADMIN — Medication 100 MCG: at 10:54

## 2023-07-07 RX ADMIN — ACETAMINOPHEN 650 MG: 325 TABLET, FILM COATED ORAL at 20:51

## 2023-07-07 RX ADMIN — LIDOCAINE HYDROCHLORIDE AND EPINEPHRINE 5 ML: 15; 5 INJECTION, SOLUTION EPIDURAL at 07:42

## 2023-07-07 RX ADMIN — Medication: at 14:44

## 2023-07-07 RX ADMIN — DOCUSATE SODIUM 100 MG: 100 CAPSULE, LIQUID FILLED ORAL at 20:51

## 2023-07-07 RX ADMIN — Medication 10 ML/HR: at 07:52

## 2023-07-07 RX ADMIN — Medication 100 MCG: at 10:40

## 2023-07-07 RX ADMIN — ONDANSETRON 4 MG: 2 INJECTION INTRAMUSCULAR; INTRAVENOUS at 10:47

## 2023-07-07 RX ADMIN — SODIUM CHLORIDE, POTASSIUM CHLORIDE, SODIUM LACTATE AND CALCIUM CHLORIDE: 600; 310; 30; 20 INJECTION, SOLUTION INTRAVENOUS at 07:19

## 2023-07-07 ASSESSMENT — ACTIVITIES OF DAILY LIVING (ADL)
ADLS_ACUITY_SCORE: 24
ADLS_ACUITY_SCORE: 18
ADLS_ACUITY_SCORE: 24
ADLS_ACUITY_SCORE: 24
ADLS_ACUITY_SCORE: 18
ADLS_ACUITY_SCORE: 24
ADLS_ACUITY_SCORE: 18
ADLS_ACUITY_SCORE: 24
ADLS_ACUITY_SCORE: 24
ADLS_ACUITY_SCORE: 18
ADLS_ACUITY_SCORE: 18
ADLS_ACUITY_SCORE: 24

## 2023-07-07 NOTE — H&P
[facility]    History and Physical  Obstetrics and Gynecology     Date of Admission:  2023    Assessment & Plan   Mya Gant is a 22 year old female who presents with induction of labor for polyhydramnios at 39w0d  ASSESSMENT:   IUP @ 39w0d for induction of labor.  Indication polyhydramnios.  NST reactive.  Category  I    PLAN:   Admit - see IP orders    Jose Aviles MD    History of Present Illness   Mya Gant is a 22 year old female  39w0d  Estimated Date of Delivery: 2023 is calculated from Patient's last menstrual period was 2022 (approximate). [facility].  Indication polyhydramnios. Pregnancy otherwise complicated by short femur on US with neg cffDNA testing, and anxiety.    PRENATAL COURSE  Prenatal course was complicated by the above      Recent Labs   Lab Test 23  1708   AS Positive*     Rhogam not indicated   Recent Labs   Lab Test 22  1032   HEPBANG Nonreactive   HIAGAB Nonreactive   RUQIGG Positive       Past Medical History    I have reviewed this patient's medical history and updated it with pertinent information if needed.   Past Medical History:   Diagnosis Date     Anxiety disorder      Jaundice      Migraines      Pneumonia     History of left lower lobe pneumonia times two.     Varicella        Past Surgical History   I have reviewed this patient's surgical history and updated it with pertinent information if needed.  Past Surgical History:   Procedure Laterality Date     NO HISTORY OF SURGERY         Prior to Admission Medications   Prior to Admission Medications   Prescriptions Last Dose Informant Patient Reported? Taking?   Magnesium Oxide 250 MG TABS 2023  Yes Yes   Omega-3 Fatty Acids (SEA-OMEGA PO) 2023  Yes Yes   Prenatal Vit-Fe Fumarate-FA (PRENATAL MULTIVITAMIN W/IRON) 27-0.8 MG tablet 2023  No Yes   Sig: Take 1 tablet by mouth daily   Probiotic Product (PROBIOTIC  DAILY PO) Past Week  Yes Yes      Facility-Administered Medications: None     Allergies   No Known Allergies    Social History   I have reviewed this patient's social history and updated it with pertinent information if needed. Mya Gant  reports that she has never smoked. She has never used smokeless tobacco. She reports that she does not currently use alcohol. She reports that she does not use drugs.    Family History   I have reviewed this patient's family history and updated it with pertinent information if needed.   Family History   Problem Relation Age of Onset     Other - See Comments Mother         Psychiatric illness,depression and anxiety     Anxiety Disorder Father      Anxiety Disorder Maternal Grandmother      Cancer Maternal Grandfather        Immunization History   Immunization status is unknown    Physical Exam                      Vital Signs with Ranges       Abdomen: gravid, single vertex fetus, non-tender, EFW 6 lbs 0  Cervical Exam: 1/ 60/ Posterior/ soft/ -1 , SROM overnight after cytotec number 3.    Fetal Heart Tones: 140 baseline, moderate variablility, + accels, no decels and Category I  TOCO:   frequency q 2-3 minutes    Constitutional: healthy, alert, active and moderate distress   Respiratory: No increased work of breathing, good air exchange, clear to auscultation bilaterally, no crackles or wheezing  Cardiovascular: Normal apical impulse, regular rate and rhythm, normal S1 and S2, no S3 or S4, and no murmur noted  Skin/Extremites: no rashes and no lesions  Neurologic: A&O x 3, uncomfortable

## 2023-07-07 NOTE — ANESTHESIA PROCEDURE NOTES
"Epidural catheter Procedure Note    Pre-Procedure   Staff -        CRNA: Nikolay Gonsalves APRN CRNA       Performed By: CRNA       Location: OB       Procedure Start/Stop Times: 7/7/2023 7:25 AM and 7/7/2023 7:59 AM       Pre-Anesthestic Checklist: patient identified, IV checked, risks and benefits discussed, informed consent, monitors and equipment checked, pre-op evaluation, at physician/surgeon's request and post-op pain management  Timeout:       Correct Patient: Yes        Correct Procedure: Yes        Correct Site: Yes        Correct Position: Yes   Procedure Documentation  Procedure: epidural catheter       Diagnosis: Labor pain       Patient Position: sitting       Patient Prep/Sterile Barriers: sterile gloves, patient draped       Skin prep: Chloraprep       Local skin infiltrated with 5 mL of 1% lidocaine.        Insertion Site: L3-4. (midline approach).       Technique: LORT air        NASH at 9 cm.       Needle Type: Touhy needle       Needle Gauge: 17.        Needle Length (Inches): 3.5        Catheter: 19 G.          Catheter threaded easily.           Threaded 16 cm at skin.         # of attempts: 2 and  # of redirects:  1    Assessment/Narrative         Paresthesias: No.       Test dose of 5 mL lidocaine 1.5% w/ 1:200,000 epinephrine at 07:42 CDT.         Test dose negative, 3 minutes after injection, for signs of intravascular, subdural, or intrathecal injection.       Insertion/Infusion Method: LORT air       Aspiration negative for Heme or CSF via Epidural Catheter.    Medication(s) Administered   Medication Administration Time: 7/7/2023 7:25 AM      FOR [facility] ([facility]) ONLY:   Pain Team Contact information: please page the Pain Team Via 1stdibs. Search \"Pain\". During daytime hours, please page the attending first. At night please page the resident first.      "

## 2023-07-07 NOTE — PROGRESS NOTES
Patient delivered a viable baby girl via  at 1737 by Dr. Aviles. 4 pulls with the vacuum and 2 pop offs. Episiotomy repaired.Hina Lazaro RN on 2023 at 5:53 PM

## 2023-07-07 NOTE — PROGRESS NOTES
Third dose of cytotec placed, difficult SVE at this time. Patient did not tolerate exam, denies pain medications at this time. Patient encouraged to use distraction techniques, exercise ball, ambulation and tub bath to which she is agreeable.

## 2023-07-07 NOTE — PROGRESS NOTES
Cx 6-7/100/0 at 1145 AM  EFM cat 1  CPM    Anticipated .    Jose Aviles MD FACOG  12:31 PM 2023

## 2023-07-07 NOTE — L&D DELIVERY NOTE
OB Vaginal Delivery Note    Mya Gant MRN# [id number]   Age: 22 year old YOB: 2000       GA: 39w0d  GP:   Labor Complications: None   EBL:   mL  Delivery QBL:    Delivery Type: Vaginal, Vacuum (Extractor)   ROM to Delivery Time: (Delivered) Hours: 11 Minutes: 53  Scammon Weight:     1 Minute 5 Minute 10 Minute   Apgar Totals: 8   9        ULICES CORDERO;WATSON LAZARO     Delivery Details:  See vacuum description below       Desirae Gant [[id number]]    Labor Event Times    Active labor onset date: 23 Onset time: 11:45 AM CDT   Dilation complete date: 23       Labor Events     labor?: No   steroids: None  Labor Type: Induction/Cervical ripening  Predominate monitoring during 1st stage: continuous electronic fetal monitoring     Antibiotics received during labor?: No     Rupture date/time: 23 0544   Rupture type: Spontaneous Rupture of Membranes  Fluid color: Clear  Fluid odor: Normal     Induction: Misoprostol  Induction date/time: 23 0700    Cervical ripening date/time: 23 1900    Indications for induction: Other Pregnant Patient Indications (Comment to specify)     Augmentation: None     Delivery/Placenta Date and Time    Delivery Date: 23 Delivery Time:  5:37 PM   Placenta Date/Time: 2023  5:40 PM  Oxytocin given at the time of delivery: after delivery of baby  Delivering clinician: Jose Aviles MD   Other personnel present at delivery:  Provider Role   Ulices Cordero, RN Delivery Nurse   Watson Lazaro, RN Delivery Nurse         Vaginal Counts     Initial count performed by 2 team members:  Two Team Members   taran suárez, rn       Needles Suture Needles Sponges (RETIRED) Instruments   Initial counts 1 0 6    Added to count  2     Relief counts       Final counts 1 2 6          Placed during labor Accounted for at the end of labor   FSE No NA   IUPC Yes Yes   Cervidil No NA              Final count performed by 2 team  members:  Two Team Members   rogelio chaudhari md      Final count correct?: Yes     Apgars    Living status: Living   1 Minute 5 Minute 10 Minute 15 Minute 20 Minute   Skin color: 0  1       Heart rate: 2  2       Reflex irritability: 2  2       Muscle tone: 2  2       Respiratory effort: 2  2       Total: 8  9       Apgars assigned by: ROGELIO CHAUDHARI     Cord    Vessels: 3 Vessels    Cord Complications: Nuchal   Nuchal Intervention: reduced         Nuchal cord description: loose nuchal cord         Cord Blood Disposition: Lab    Gases Sent?: No    Delayed cord clamping?: No    Stem cell collection?: No        Resuscitation    Methods: None     Skin to Skin and Feeding Plan    Skin to skin initiation date/time: 1841    Skin to skin with: Mother  Skin to skin end date/time:        Labor Events and Shoulder Dystocia    Fetal Tracing Prior to Delivery: Category 1  Shoulder dystocia present?: Neg     Delivery (Maternal) (Provider to Complete) ([id number])    Episiotomy: Right Mediolateral  Reason for episiotomy: vacuum with maternal exhaustion    Perineal lacerations: None    Repair suture: 3-0 Vicryl  Number of repair packets: 2  Genital tract inspection done: Pos     Blood Loss  Mother: Mya Gant #[id number]   Start of Mother's Information    Delivery Blood Loss  23 1145 - 23 1807    None           End of Mother's Information  Mother: Mya Gant #[id number]          Delivery - Provider to Complete ([id number])    Delivering clinician: Jose Aviles MD  Delivery Type (Choose the 1 that will go to the Birth History): Vaginal, Vacuum (Extractor)    Verbal Informed Consent Obtained For Vacuum: Yes Alternate Labor Strategies Discussed (vacuum): Yes    Emergency Resources Available (vacuum): Charge Nurse/Team, Resuscitation Team   Type (vacuum): Kiwi M-style       # of Pop-Offs (vacuum): 2 # of Pulls/Contractions (vacuum): 4   Position (vacuum): OT Fetal Station (vacuum): +4      Indication for  Operative Vaginal Delivery (vacuum): Arrest of Descent, Maternal Exhaustion   Operative Vaginal Delivery Brief Note Vacuum: After discussion with the patient regarding risks, benefits and alternatives of vacuum assisted vaginal delivery we elected to proceed.  The indication for assisted delivery was maternal exhaustion and arrest of fetal descent.  The patient was appropriately positioned in dorsal lithotomy, and her bladder emptied with a red rubber catheter.  The Kiwi M-style vacuum device was positioned appropriately at +4/5 station. A RML episiotomy was cut and the fetal head delivered after 4 pulls with 4 expulsive efforts, and 2 pop-offs.  Maximum pressures during expulsive efforts as read on the vacuum device remained less than 600 mmHG delivering the fetal head.  The nose and mousewere then bulb suctioned.  The anterior shoulder was gently delivered with posterior traction. The remainder of the baby was then delivered, the cord clamped and cut, and the baby placed on the maternal abdomen.  The episiotomy was repaired with 3-0 vicryl inlayered fashion.  The perineum revealed no further damage or injury and the rectal sphincter was noted to be intact.  EBL was 300 ml.  Apgars are pending.  Maternal blood type is Rh neg and cord blood was collected. Mother and baby are currently stable in the postpartum unit.  No complications occurred.             Other personnel:  Provider Role   Ulices Galeas, RN Delivery Nurse   Hina ingram, RN Delivery Nurse                Placenta    Date/Time: 7/7/2023  5:40 PM  Removal: Spontaneous  Disposition: Hospital disposal           Anesthesia    Method: Epidural  Cervical dilation at placement: 0-3                       Jose Aviles MD

## 2023-07-07 NOTE — PROGRESS NOTES
"Patient reports feeling SOB with chest heaviness. Patient assisted into upright, sitting position.     1040: BP 89/50 at this time. Fluid bolus and phenylephrine administration completed.     1048: BP 94/46. O2 100%. Phenylephrine administered.    1053: BP 83/53. LJ Link updated. Patient states, \"I cant breathe\". Epidural shut off and additional dose of phenylephrine administered.    Maceyke CRNA to bedside for assessment. No concern for dermatomes moving higher of which would affect patients breathing.     Patient requesting anxiety medication at this time.     Called and updated Dr. Aviles of situation and new order for Vistaril 50 mg PO once for anxiety.     Patient reports she feels better at this time and it is easier to breath. Assisted her to slight tilt to left side. /65. Cervix 8/100/-1 per Dr. Aviles's assessment. Patient tolerated well.    Ulices Galeas RN 07/07/23 11:45 AM              "

## 2023-07-07 NOTE — PROGRESS NOTES
Category II Fetal Tracing   Category II Algorithm    S:   I was notified by labor RN of persistent Category II fetal heart rate tracing for 15 minutes.    Strip reviewed at bedside.   I came to the bedside to review with the RN.    O:   Vitals: There were no vitals filed for this visit.  Fetal Baseline Rate: 150, normal  Variability: moderate  Accelerations: not present   Decelerations: present -  early.       Deceleration frequency: intermittent       Are the decelerations significant?   No.  Uterine Activity: every 2-3 minutes    Interventions to improve fetal oxygenation for a Category II tracing include: Category II algorithm reviewed    A:   Persistent Category II tracing.     P:   Per the Category II algorithm, the plan is to continue to observe and apply intrauterine resuscitation measures as indicated.  Reevaluate in 15 min.

## 2023-07-07 NOTE — ANESTHESIA PREPROCEDURE EVALUATION
Anesthesia Pre-Procedure Evaluation    Patient: Mya Gant   MRN: [id number] : 2000        Procedure : * No procedures listed *          Past Medical History:   Diagnosis Date     Anxiety disorder      Jaundice      Migraines      Pneumonia     History of left lower lobe pneumonia times two.     Varicella       Past Surgical History:   Procedure Laterality Date     NO HISTORY OF SURGERY        No Known Allergies   Social History     Tobacco Use     Smoking status: Never     Smokeless tobacco: Never   Substance Use Topics     Alcohol use: Not Currently     Comment: occasional      Wt Readings from Last 1 Encounters:   23 99.8 kg (220 lb)        Anesthesia Evaluation   Pt has had prior anesthetic.     No history of anesthetic complications       ROS/MED HX  ENT/Pulmonary:       Neurologic:       Cardiovascular: Comment: Known asymptomatic PFO with METS>4      METS/Exercise Tolerance: >4 METS    Hematologic:       Musculoskeletal:       GI/Hepatic:       Renal/Genitourinary:       Endo:       Psychiatric/Substance Use:     (+) psychiatric history anxiety     Infectious Disease:       Malignancy:       Other:      (+) Possibly pregnant, ,         Physical Exam    Airway  airway exam normal      Mallampati: II   TM distance: > 3 FB   Neck ROM: full   Mouth opening: > 3 cm    Respiratory Devices and Support         Dental       (+) Completely normal teeth      Cardiovascular   cardiovascular exam normal       Rhythm and rate: regular and normal     Pulmonary   pulmonary exam normal        breath sounds clear to auscultation       Other findings: Polyhydramnios     OUTSIDE LABS:  CBC:   Lab Results   Component Value Date    WBC 11.9 (H) 2023    WBC 10.0 2023    HGB 12.6 2023    HGB 13.0 2023    HCT 36.1 2023    HCT 37.8 2023     2023     2023     BMP:   Lab Results   Component Value Date     (L) 2023     (L) 2023     POTASSIUM 4.1 06/26/2023    POTASSIUM 3.4 05/23/2023    CHLORIDE 100 06/26/2023    CHLORIDE 103 05/23/2023    CO2 21 (L) 06/26/2023    CO2 20 (L) 05/23/2023    BUN 7.3 06/26/2023    BUN 8.0 05/23/2023    CR 0.60 06/26/2023    CR 0.63 05/23/2023    GLC 79 06/26/2023     (H) 05/23/2023     COAGS: No results found for: PTT, INR, FIBR  POC:   Lab Results   Component Value Date    HCG Negative 08/07/2022    HCGS Negative 09/22/2022     HEPATIC:   Lab Results   Component Value Date    ALBUMIN 3.5 06/26/2023    PROTTOTAL 6.1 (L) 06/26/2023    ALT 20 06/26/2023    AST 21 06/26/2023    ALKPHOS 150 (H) 06/26/2023    BILITOTAL 0.3 06/26/2023     OTHER:   Lab Results   Component Value Date    CLARENCE 8.5 (L) 06/26/2023    MAG 1.9 11/01/2022    TSH 1.40 11/01/2022    CRP 3.5 08/07/2022       Anesthesia Plan    ASA Status:  2   NPO Status:  NPO Appropriate    Anesthesia Type: Epidural.              Consents    Anesthesia Plan(s) and associated risks, benefits, and realistic alternatives discussed. Questions answered and patient/representative(s) expressed understanding.    - Discussed:     - Discussed with:  Patient, Spouse         Postoperative Care            Comments:                JOSE ANTONIO Richards CRNA

## 2023-07-07 NOTE — PLAN OF CARE
Patient independent in the room. Second dose of cytotec placed at 2230, SVE 0-1cm. See flow sheets for contraction pattern/FHT. Patient denies pain with contractions but states she can feel tightening. Patient tolerating regular diet and fluids. C/o feeling warm and lightheaded during assessment, VSS. Temperature in room adjusted, fan and cold pack provided. Patient educated not to ambulate alone when feeling lightheaded and verbalizes  understanding. Patient denies further needs at this time.

## 2023-07-08 LAB
ABO/RH(D): ABNORMAL
FETAL BLEED SCREEN: ABNORMAL
HGB BLD-MCNC: 11.5 G/DL (ref 11.7–15.7)
SPECIMEN EXPIRATION DATE: ABNORMAL

## 2023-07-08 PROCEDURE — 120N000001 HC R&B MED SURG/OB

## 2023-07-08 PROCEDURE — 85018 HEMOGLOBIN: CPT | Performed by: OBSTETRICS & GYNECOLOGY

## 2023-07-08 PROCEDURE — 250N000011 HC RX IP 250 OP 636: Mod: JZ | Performed by: OBSTETRICS & GYNECOLOGY

## 2023-07-08 PROCEDURE — 3E0234Z INTRODUCTION OF SERUM, TOXOID AND VACCINE INTO MUSCLE, PERCUTANEOUS APPROACH: ICD-10-PCS | Performed by: OBSTETRICS & GYNECOLOGY

## 2023-07-08 PROCEDURE — 36415 COLL VENOUS BLD VENIPUNCTURE: CPT | Performed by: OBSTETRICS & GYNECOLOGY

## 2023-07-08 PROCEDURE — 99207 PR NO CHARGE LOS: CPT | Performed by: OBSTETRICS & GYNECOLOGY

## 2023-07-08 PROCEDURE — 85460 HEMOGLOBIN FETAL: CPT | Performed by: OBSTETRICS & GYNECOLOGY

## 2023-07-08 PROCEDURE — 85461 HEMOGLOBIN FETAL: CPT | Performed by: OBSTETRICS & GYNECOLOGY

## 2023-07-08 PROCEDURE — 250N000013 HC RX MED GY IP 250 OP 250 PS 637: Performed by: OBSTETRICS & GYNECOLOGY

## 2023-07-08 RX ADMIN — IBUPROFEN 800 MG: 400 TABLET, FILM COATED ORAL at 15:42

## 2023-07-08 RX ADMIN — HUMAN RHO(D) IMMUNE GLOBULIN 300 MCG: 1500 SOLUTION INTRAMUSCULAR; INTRAVENOUS at 12:38

## 2023-07-08 RX ADMIN — ACETAMINOPHEN 650 MG: 325 TABLET, FILM COATED ORAL at 06:25

## 2023-07-08 RX ADMIN — DOCUSATE SODIUM 100 MG: 100 CAPSULE, LIQUID FILLED ORAL at 08:20

## 2023-07-08 RX ADMIN — ACETAMINOPHEN 650 MG: 325 TABLET, FILM COATED ORAL at 18:31

## 2023-07-08 RX ADMIN — HUMAN RHO(D) IMMUNE GLOBULIN 300 MCG: 1500 SOLUTION INTRAMUSCULAR; INTRAVENOUS at 08:21

## 2023-07-08 RX ADMIN — IBUPROFEN 800 MG: 400 TABLET, FILM COATED ORAL at 04:10

## 2023-07-08 RX ADMIN — PRENATAL VIT W/ FE FUMARATE-FA TAB 27-0.8 MG 1 TABLET: 27-0.8 TAB at 08:20

## 2023-07-08 RX ADMIN — IBUPROFEN 800 MG: 400 TABLET, FILM COATED ORAL at 10:45

## 2023-07-08 ASSESSMENT — ACTIVITIES OF DAILY LIVING (ADL)
ADLS_ACUITY_SCORE: 28

## 2023-07-08 NOTE — ANESTHESIA POSTPROCEDURE EVALUATION
Patient: Mya Gant    Procedure: * No procedures listed *       Anesthesia Type:  Epidural    Note:  Disposition: Inpatient   Postop Pain Control: Uneventful            Sign Out: Well controlled pain   PONV: No   Neuro/Psych: Uneventful            Sign Out: Acceptable/Baseline neuro status   Airway/Respiratory: Uneventful            Sign Out: Acceptable/Baseline resp. status   CV/Hemodynamics: Uneventful            Sign Out: Acceptable CV status; No obvious hypovolemia; No obvious fluid overload   Other NRE: NONE   DID A NON-ROUTINE EVENT OCCUR? No    Event details/Postop Comments:  Patient happy with epidural. Up walking, no residual numbness or tingling, voiding without difficulty, denies fevers or chills.                 Last vitals:  Vitals:    07/07/23 1930 07/07/23 1945 07/07/23 2300   BP: 106/58 109/57 108/63   Pulse:   75   Resp:   14   Temp:   97.2  F (36.2  C)   SpO2: 98% 99% 98%       Electronically Signed By: JOSE ANTONIO Richards CRNA  July 8, 2023  8:43 AM

## 2023-07-08 NOTE — PROGRESS NOTES
2300 attempted to stand and pivot patient to commode to void with staff assist x2 and gait belt. Patient right leg is still numb, unable to pivot transfer.   Positioned back in bed. Repeat bladder scan shows volume of 197 mL.  Will continue to monitor and continue to bladder scan and intervene as necessary.     Jennifer Sorto RN

## 2023-07-08 NOTE — PLAN OF CARE
Goal Outcome Evaluation:    Cervix complete. Pushing effectively. Denies pain. Epidural working well per patient. MD at bedside intermittently for routine pushing assessment. Vitals stable.       Plan of Care Reviewed With: patient  Overall Patient Progress: improving  Overall Patient Progress: improving  Outcome Evaluation: Pain well managed    Ulices Galeas RN 07/07/23 7:34 PM

## 2023-07-08 NOTE — PROGRESS NOTES
[facility]    Post-Partum Progress Note    Assessment & Plan   Assessment:  Post-partum day #1  Vacuum extraction    Doing well.  No excessive bleeding  Pain well-controlled.    Plan:  Anticipate discharge tomorrow    Jose Aviles MD     Interval History   Doing well.  Pain is well-controlled.  No fevers.  No history of foul-smelling vaginal discharge.  Good appetite.  Denies chest pain, shortness of breath, nausea or vomiting.  Vaginal bleeding is similar to a heavy menstrual flow.  Ambulatory.  Breastfeeding well.    Medications     oxytocin in 0.9% NaCl         docusate sodium  100 mg Oral Daily     Measles, Mumps & Rubella Vac  0.5 mL Subcutaneous Once     prenatal multivitamin w/iron  1 tablet Oral Daily     rho(D) immune globulin  300 mcg Intravenous Once    Or     rho(D) immune globulin  300 mcg Intramuscular Once     Tdap (tetanus-diphtheria-acell pertussis)  0.5 mL Intramuscular Once       Physical Exam   Temp: 97.2  F (36.2  C) Temp src: Temporal BP: 108/63 Pulse: 75   Resp: 14 SpO2: 98 % O2 Device: None (Room air)    Vitals:    07/07/23 0908   Weight: 99.8 kg (220 lb)     Vital Signs with Ranges  Temp:  [96.4  F (35.8  C)-98.2  F (36.8  C)] 97.2  F (36.2  C)  Pulse:  [70-90] 75  Resp:  [14-18] 14  BP: ()/(46-95) 108/63  SpO2:  [89 %-100 %] 98 %  I/O last 3 completed shifts:  In: 500 [P.O.:500]  Out: 2438 [Urine:2000; Blood:438]    Uterine fundus is firm, non-tender and at the level of the umbilicus  Extremities Non-tender    Data   Recent Labs   Lab Test 07/06/23  1708   AS Positive*     Recent Labs   Lab Test 07/08/23  0612 07/06/23  1708   HGB 11.5* 12.6     Recent Labs   Lab Test 12/20/22  1032   RUQIGG Positive

## 2023-07-08 NOTE — PLAN OF CARE
Care Plan/Shift note: Mya Gant is doing well after vaginal delivery at 1737. Fundus is firm with light bleeding and no clots. 2 degree tear with repair. Ice packs, medications pads & spray and modesto bottle used for comfort. Patient requesting to take bath/shower tomorrow morning. Patient is breast feeding fairly well. Nipple shield is helping with baby latch. Patient has minimal amounts of pain that is well managed with oral analgesics. Patient stood at edge of bed with pivot to bedside commode with staff assist x2. She is voiding, spontaneously and without difficulty. Bowel sounds are active. Patient is Rh incompatible with baby. Pt is in need of a dose of Rhogam. Labs entered and am awaiting results and dosage to administer... , Reed is present and rooming in. Both parents are involved in infant cares. Patient has verbalized understanding of routine cares and warning signs.    Jennifer Sorto RN

## 2023-07-08 NOTE — PROGRESS NOTES
"Patient is s/p vaginal delivery at 1737. Patient was straight cathed by MD at 1730. Epidural was discontinued immediatly following delivery.   2100 patient is still unable to lift buttock off bed. No urge to void. States she \"feels leaking\" and is unsure if it is from her bladder or vagina. Assessment shows minimal bleeding. Fundus is Firm and midline.   Bladder scan preformed at 250 mL present. Will reassess dermatomes and urge to void. Explained to patient the importance of monitoring and the possibility of straight cath if need. Patient is agreeable to this plan.   Jennifer Sorto RN    "

## 2023-07-08 NOTE — PLAN OF CARE
"Pt is doing well post vaginal delivery. She is up independently in room. Voiding spontaneous without issue. Fundus is firm, 1 below umbilicus. Lochia light. Using ice, tux and dermoblast to perineum and buttocks. Tylenol and ibuprofen for pain. Breastfeeding every 2-3 hours and on demand. Nipples are flat, using nipple shield to help with latch. Education on breastfeeding techniques and cues provided. , Reed at bedside. Both Reed and Mya very attentive to baby. Responding to needs. VSS. +3 edema throughout body.     BP 98/57   Pulse 75   Temp 97  F (36.1  C) (Temporal)   Resp 16   Ht 1.626 m (5' 4\")   Wt 99.8 kg (220 lb)   LMP 09/28/2022 (Approximate)   SpO2 99%   Breastfeeding Unknown   BMI 37.76 kg/m      "

## 2023-07-09 VITALS
HEIGHT: 64 IN | DIASTOLIC BLOOD PRESSURE: 58 MMHG | OXYGEN SATURATION: 97 % | WEIGHT: 220 LBS | SYSTOLIC BLOOD PRESSURE: 115 MMHG | TEMPERATURE: 96.1 F | HEART RATE: 66 BPM | RESPIRATION RATE: 16 BRPM | BODY MASS INDEX: 37.56 KG/M2

## 2023-07-09 LAB
FETAL RBC % LFV: 0 %
FETAL RBC (ML): 0 ML
IF INDICATED RECOMMENDED DOSE OF RH IMMUNE GLOBULIN UG: 300 UG

## 2023-07-09 PROCEDURE — 99207 PR NO CHARGE LOS: CPT | Performed by: OBSTETRICS & GYNECOLOGY

## 2023-07-09 PROCEDURE — 250N000013 HC RX MED GY IP 250 OP 250 PS 637: Performed by: OBSTETRICS & GYNECOLOGY

## 2023-07-09 RX ORDER — IBUPROFEN 800 MG/1
800 TABLET, FILM COATED ORAL EVERY 6 HOURS PRN
Qty: 20 TABLET | Refills: 0 | Status: SHIPPED | OUTPATIENT
Start: 2023-07-09 | End: 2023-08-21

## 2023-07-09 RX ORDER — DOCUSATE SODIUM 100 MG/1
100 CAPSULE, LIQUID FILLED ORAL 2 TIMES DAILY PRN
Qty: 20 CAPSULE | Refills: 0 | Status: SHIPPED | OUTPATIENT
Start: 2023-07-09 | End: 2023-08-21

## 2023-07-09 RX ADMIN — IBUPROFEN 800 MG: 400 TABLET, FILM COATED ORAL at 05:03

## 2023-07-09 ASSESSMENT — ACTIVITIES OF DAILY LIVING (ADL)
ADLS_ACUITY_SCORE: 28

## 2023-07-09 NOTE — DISCHARGE SUMMARY
[facility]    Discharge Summary  Obstetrics    Date of Admission:  7/6/2023  Date of Discharge:  7/9/2023  Discharging Provider: Jose Aviles MD    Discharge Diagnoses   Vacuum assisted vaginal delivery    History of Present Illness   Mya Gant is a 22 year old female who presented with labor induction for polyhydramnios    Hospital Course   The patient's hospital course was unremarkable.  She recovered as anticipated and experienced no post-delivery complications.  On discharge, her pain was well controlled. Vaginal bleeding is similar to peak menstrual flow.  Voiding without difficulty.  Ambulating well and tolerating a normal diet.  No fevers.  Breastfeeding well.  Infant is stable.  She was discharged on post-partum day #2.    Post-partum hemoglobin:   Hemoglobin   Date Value Ref Range Status   07/08/2023 11.5 (L) 11.7 - 15.7 g/dL Final   09/25/2017 14.8 12.0 - 16.0 g/dL        Jose Aviles MD    Discharge Disposition   Discharged to home   Condition at discharge: Stable    Primary Care Physician   Gemini Alvarado    Consultations This Hospital Stay   LACTATION IP CONSULT    Discharge Orders      Activity    Activity as tolerated     Reason for your hospital stay    Maternity care     Follow Up    Follow up with provider in 2 weeks and 6 weeks for post-delivery checks     Breast pump - Manual/Electric    Breast Pump Documentation:  Manual/Electric Pump: To support adequate breast milk production and nutrition for infant.     I, the undersigned, certify that the above prescribed supplies are medically necessary for this patient and is both reasonable and necessary in reference to accepted standards of medical and necessary in reference to accepted standards of medical practice in the treatment of this patient's condition and is not prescribed as a convenience.     Diet    Resume previous diet     Discharge Medications   Current Discharge Medication List      START taking these  medications    Details   docusate sodium (COLACE) 100 MG capsule Take 1 capsule (100 mg) by mouth 2 times daily as needed for constipation  Qty: 20 capsule, Refills: 0    Associated Diagnoses: Single liveborn infant delivered vaginally      ibuprofen (ADVIL/MOTRIN) 800 MG tablet Take 1 tablet (800 mg) by mouth every 6 hours as needed for other (cramping)  Qty: 20 tablet, Refills: 0    Associated Diagnoses: Single liveborn infant delivered vaginally         CONTINUE these medications which have NOT CHANGED    Details   Omega-3 Fatty Acids (SEA-OMEGA PO)       Prenatal Vit-Fe Fumarate-FA (PRENATAL MULTIVITAMIN W/IRON) 27-0.8 MG tablet Take 1 tablet by mouth daily  Qty: 90 tablet, Refills: 3    Associated Diagnoses: Missed menses      Probiotic Product (PROBIOTIC DAILY PO)          STOP taking these medications       Magnesium Oxide 250 MG TABS Comments:   Reason for Stopping:             Allergies   No Known Allergies

## 2023-07-09 NOTE — PLAN OF CARE
VSS. Assessment WNL. Patient breast feeding independently every 2-3 hours. Perineum swollen, patient has been applying ice packs, tucks pads, dermoplast spray and taking tub baths. Pain is well controlled with interventions and PRN medications. Hina Lazaro RN on 7/9/2023 at 10:23 AM

## 2023-07-09 NOTE — PROGRESS NOTES
[facility]    Post-Partum Progress Note    Assessment & Plan   Assessment:  Post-partum day #2  Vacuum extraction    Doing well.  No excessive bleeding  Pain well-controlled.    Plan:  Discharge later today    Jose Aviles MD     Interval History   Doing well.  Pain is well-controlled.  No fevers.  No history of foul-smelling vaginal discharge.  Good appetite.  Denies chest pain, shortness of breath, nausea or vomiting.  Vaginal bleeding is similar to a heavy menstrual flow.  Ambulatory.  Breastfeeding well.    Medications     oxytocin in 0.9% NaCl         docusate sodium  100 mg Oral Daily     prenatal multivitamin w/iron  1 tablet Oral Daily       Physical Exam   Temp: (!) 96.6  F (35.9  C) Temp src: Temporal BP: 99/56 Pulse: 66   Resp: 16 SpO2: 97 % O2 Device: None (Room air)    Vitals:    07/07/23 0908   Weight: 99.8 kg (220 lb)     Vital Signs with Ranges  Temp:  [96.6  F (35.9  C)] 96.6  F (35.9  C)  Pulse:  [66] 66  Resp:  [16] 16  BP: ()/(56-60) 99/56  SpO2:  [97 %-99 %] 97 %  No intake/output data recorded.    Uterine fundus is firm, non-tender and at the level of the umbilicus  Extremities Non-tender    Data   Recent Labs   Lab Test 07/06/23  1708   AS Positive*     Recent Labs   Lab Test 07/08/23  0612 07/06/23  1708   HGB 11.5* 12.6     Recent Labs   Lab Test 12/20/22  1032   RUQIGG Positive

## 2023-07-09 NOTE — DISCHARGE INSTRUCTIONS
Warning Signs after Having a Baby    Keep this paper on your fridge or somewhere else where you can see it.    Call your provider if you have any of these symptoms up to 12 weeks after having your baby.    Thoughts of hurting yourself or your baby  Pain in your chest or trouble breathing  Severe headache not helped by pain medicine  Eyesight concerns (blurry vision, seeing spots or flashes of light, other changes to eyesight)  Fainting, shaking or other signs of a seizure    Call 9-1-1 if you feel that it is an emergency.     The symptoms below can happen to anyone after giving birth. They can be very serious. Call your provider if you have any of these warning signs.    My provider s phone number: _______________________    Losing too much blood (hemorrhage)    Call your provider if you soak through a pad in less than an hour or pass blood clots bigger than a golf ball. These may be signs that you are bleeding too much.    Blood clots in the legs or lungs    After you give birth, your body naturally clots its blood to help prevent blood loss. Sometimes this increased clotting can happen in other areas of the body, like the legs or lungs. This can block your blood flow and be very dangerous.     Call your provider if you:  Have a red, swollen spot on the back of your leg that is warm or painful when you touch it.   Are coughing up blood.     Infection    Call your provider if you have any of these symptoms:  Fever of 100.4 F (38 C) or higher.  Pain or redness around your stitches if you had an incision.   Any yellow, white, or green fluid coming from places where you had stitches or surgery.    Mood Problems (postpartum depression)    Many people feel sad or have mood changes after having a baby. But for some people, these mood swings are worse.     Call your provider right away if you feel so anxious or nervous that you can't care for yourself or your baby.    Preeclampsia (high blood pressure)    Even if you  didn't have high blood pressure when you were pregnant, you are at risk for the high blood pressure disease called preeclampsia. This risk can last up to 12 weeks after giving birth.     Call your provider if you have:   Pain on your right side under your rib cage  Sudden swelling in the hands and face    Remember: You know your body. If something doesn't feel right, get medical help.     For informational purposes only. Not to replace the advice of your health care provider. Copyright 2020 [facility]. All rights reserved. Clinically reviewed by Zohra Gann, RNC-OB, MSN. [employer] [id number] - Rev 02/23.

## 2023-07-09 NOTE — PROGRESS NOTES
Patient discharged to home at this time. All discharge education complete, all questions have been answered. Patient and spouse feel comfortable being discharged at this time. Hina Lazaro RN on 7/9/2023 at 10:24 AM

## 2023-07-10 ENCOUNTER — LACTATION ENCOUNTER (OUTPATIENT)
Age: 23
End: 2023-07-10

## 2023-07-10 ENCOUNTER — HOSPITAL ENCOUNTER (OUTPATIENT)
Dept: OBGYN | Facility: OTHER | Age: 23
Discharge: HOME | End: 2023-07-10
Attending: PHYSICIAN ASSISTANT
Payer: COMMERCIAL

## 2023-07-10 PROCEDURE — S9443 LACTATION CLASS: HCPCS | Performed by: REGISTERED NURSE

## 2023-07-10 NOTE — LACTATION NOTE
This note was copied from a baby's chart.  Outpatient Lactation Visit    Female-Mya Gant  [id number]    Consultation Date: July 10, 2023     Reason for Lactation Referral: Initial Lactation Consult    Baby's : 2024    Baby's Current Age: 3 day old  Baby's Gestational Age: Gestational Age: 39w0d    Primary Care Provider: No primary care provider on file.    Presenting Problem (concerns as stated by parent): repeat bilirubin level obtained    MATERNAL HISTORY   History of Breast Surgery: no  Breast Changes During Pregnancy: no  Breast Feeding History: primigravida  Maternal Meds: daily prenatal vitamin  Pregnancy Complications: polyhydramnios  Anesthesia during labor: epidural    MATERNAL ASSESSMENT    Breast Size: average, symmetrical, soft after feeding and filling prior to feeding  Nipple Appearance - Left: slightly cracked, with signs of healing, education on further healing techniques provided  Nipple Appearance - Right: slightly cracked, with signs of healing, education on further healing techniques provided  Nipple Erectility - Left: erect with stimulation  Nipple Erectility - Right: erect with stimulation  Areolas Compressibility: soft  Nipple Size: average  Special Equipment Used: 24 mm nipple shield  Day mother reports milk came in:  Just starting to come in today    INFANT ASSESSMENT    Oral Anatomy  Mouth: normal  Palate: normal  Jaw: normal  Tongue: normal  Frenulum: normal   Digital Suck Exam: root    FEEDING   Feeding Time: aggressively for 25 minutes  Position:  cradle  Effort to Latch: awake and alert, latched easily  Duration of Breast Feeding: Right Breast: 5; Left Breast: 20  Results: excellent breast feed    Volume of Intake:    Birth Weight: 6 lb 13.1 oz    Hospital discharge weight: 6 lb 5.9 oz    Today's Weight 6 lb 2.2 oz    Total Intake: 0.5 oz  Output: 5-6 soil diapers in last 24 hours, 3 wet diapers in last 24 hours    LATCH Score:   Latch: 2 - Good Latch  Audible Swallowin -  Spontaneous & frequent  Type of Nipple: (Breast/Nipple) 2 - Everted  Comfort: 2 - Soft, Nontender  Hold: 2 - No Assist   Total LATCH Score:  10    FEEDING PLAN    Home Feeding Plan: Continue to feed on demand when  elicits feeding cues with deep latch.  Babe should be eating 8-12 times in a 24 hour period.  Exclusivity explained and encouraged in the early weeks to establish breastfeeding and order in milk supply.  Rooming-in encouraged with explanation of the benefits.  Continue to apply expressed breast milk and Lanolin cream to nipples after feedings for healing and comfort.  Postpartum breastfeeding assessment completed and education provided.  Items included in the education are:     proper positioning and latch    effectiveness of feeding    manual expression    handling and storing breastmilk    maintenance of breastfeeding for the first 6 months    sign/symptoms of infant feeding issues requiring referral to qualified health care provider    LACTATION COMMENTS   Bilirubin level at 13.0 mg/dl. Dr. Castaneda notified. Patient status reported. No new orders received.  Deep latch explained for proper positioning of breast in infant's mouth, maximizing milk transfer and comfort.  Reassurance and encouragement provided in regard to mom's concerns about milk supply.  Follow-up support information provided.  Parents plan to keep [place] Well-Child Check with Kalpana Alvarado as scheduled for 2 week well child check.      Face-to-face Time: 60 minutes with assessment and education.    Irma Baltazar RN  7/10/2023  1:59 PM

## 2023-07-14 ENCOUNTER — MYC MEDICAL ADVICE (OUTPATIENT)
Dept: FAMILY MEDICINE | Facility: OTHER | Age: 23
End: 2023-07-14
Payer: COMMERCIAL

## 2023-08-21 ENCOUNTER — PRENATAL OFFICE VISIT (OUTPATIENT)
Dept: OBGYN | Facility: OTHER | Age: 23
End: 2023-08-21
Attending: OBSTETRICS & GYNECOLOGY
Payer: COMMERCIAL

## 2023-08-21 VITALS
WEIGHT: 196 LBS | SYSTOLIC BLOOD PRESSURE: 118 MMHG | BODY MASS INDEX: 33.64 KG/M2 | DIASTOLIC BLOOD PRESSURE: 70 MMHG | HEART RATE: 89 BPM

## 2023-08-21 PROCEDURE — 99207 PR POST-PARTUM 6 WK VISIT - GICH ONLY: CPT | Performed by: OBSTETRICS & GYNECOLOGY

## 2023-08-21 ASSESSMENT — PATIENT HEALTH QUESTIONNAIRE - PHQ9
SUM OF ALL RESPONSES TO PHQ QUESTIONS 1-9: 2
10. IF YOU CHECKED OFF ANY PROBLEMS, HOW DIFFICULT HAVE THESE PROBLEMS MADE IT FOR YOU TO DO YOUR WORK, TAKE CARE OF THINGS AT HOME, OR GET ALONG WITH OTHER PEOPLE: NOT DIFFICULT AT ALL
SUM OF ALL RESPONSES TO PHQ QUESTIONS 1-9: 2

## 2023-08-21 ASSESSMENT — EDINBURGH POSTNATAL DEPRESSION SCALE (EPDS)
I HAVE BEEN ABLE TO LAUGH AND SEE THE FUNNY SIDE OF THINGS: AS MUCH AS I ALWAYS COULD
I HAVE BEEN ANXIOUS OR WORRIED FOR NO GOOD REASON: YES, SOMETIMES
I HAVE BLAMED MYSELF UNNECESSARILY WHEN THINGS WENT WRONG: NO, NEVER
THE THOUGHT OF HARMING MYSELF HAS OCCURRED TO ME: NEVER
I HAVE BEEN SO UNHAPPY THAT I HAVE BEEN CRYING: NO, NEVER
I HAVE BEEN SO UNHAPPY THAT I HAVE HAD DIFFICULTY SLEEPING: NOT AT ALL
I HAVE FELT SCARED OR PANICKY FOR NO GOOD REASON: NO, NOT MUCH
TOTAL SCORE: 4
THINGS HAVE BEEN GETTING ON TOP OF ME: NO, MOST OF THE TIME I HAVE COPED QUITE WELL
I HAVE LOOKED FORWARD WITH ENJOYMENT TO THINGS: AS MUCH AS I EVER DID
I HAVE FELT SAD OR MISERABLE: NO, NOT AT ALL

## 2023-08-21 ASSESSMENT — ANXIETY QUESTIONNAIRES
GAD7 TOTAL SCORE: 3
2. NOT BEING ABLE TO STOP OR CONTROL WORRYING: SEVERAL DAYS
5. BEING SO RESTLESS THAT IT IS HARD TO SIT STILL: NOT AT ALL
1. FEELING NERVOUS, ANXIOUS, OR ON EDGE: SEVERAL DAYS
IF YOU CHECKED OFF ANY PROBLEMS ON THIS QUESTIONNAIRE, HOW DIFFICULT HAVE THESE PROBLEMS MADE IT FOR YOU TO DO YOUR WORK, TAKE CARE OF THINGS AT HOME, OR GET ALONG WITH OTHER PEOPLE: NOT DIFFICULT AT ALL
4. TROUBLE RELAXING: NOT AT ALL
6. BECOMING EASILY ANNOYED OR IRRITABLE: SEVERAL DAYS
GAD7 TOTAL SCORE: 3
3. WORRYING TOO MUCH ABOUT DIFFERENT THINGS: NOT AT ALL
7. FEELING AFRAID AS IF SOMETHING AWFUL MIGHT HAPPEN: NOT AT ALL

## 2023-08-21 NOTE — NURSING NOTE
"Chief Complaint   Patient presents with    Postpartum Care     6 week postpartum     Patient is here fore 6 week postpartum,patient is breastfeeding and has not had a period. Patient does not want anything   Initial /70   Pulse 89   Wt 88.9 kg (196 lb)   LMP 09/28/2022 (Approximate)   Breastfeeding Yes   BMI 33.64 kg/m   Estimated body mass index is 33.64 kg/m  as calculated from the following:    Height as of 7/7/23: 1.626 m (5' 4\").    Weight as of this encounter: 88.9 kg (196 lb).  Medication Reconciliation: complete      Mariana Madrid LPN    "

## 2023-08-21 NOTE — PROGRESS NOTES
CC: postpartum exam at 6 weeks from delivery    HPI: Mya Gant presents for postpartum exam. Baby was born by vacuum assisted vaginal delivery. Complications included.  Mood: doing well    Breastfeeding: going well  Incision(s): episiotomy, healed well  Bleeding: none  Birthcontrol: declines    Past Medical History:   Diagnosis Date    Anxiety disorder     Jaundice     Migraines     Pneumonia     History of left lower lobe pneumonia times two.    Varicella      Past Surgical History:   Procedure Laterality Date    NO HISTORY OF SURGERY       Current Outpatient Medications   Medication    Prenatal Vit-Fe Fumarate-FA (PRENATAL MULTIVITAMIN W/IRON) 27-0.8 MG tablet    Omega-3 Fatty Acids (SEA-OMEGA PO)    Probiotic Product (PROBIOTIC DAILY PO)     No current facility-administered medications for this visit.      No Known Allergies    REVIEW OF SYSTEMS:  Neg for bowel, bladder, and breast    O: /70   Pulse 89   Wt 88.9 kg (196 lb)   LMP 09/28/2022 (Approximate)   Breastfeeding Yes   BMI 33.64 kg/m    Body mass index is 33.64 kg/m .    Exam:  Constitutional: healthy, alert, active, and no distress    [place] Depression Scale  Thoughts of Harming Self: Never  Total Score: 4        No results found for any visits on 08/21/23.      I/P:  Postpartum visit.    Resume annual preventive healthcare. Continue PNV's until done with childbearing.    RTC prn    Jose Aviles MD FACOG  2:02 PM 8/21/2023     Answers submitted by the patient for this visit:  Patient Health Questionnaire (Submitted on 8/21/2023)  If you checked off any problems, how difficult have these problems made it for you to do your work, take care of things at home, or get along with other people?: Not difficult at all  PHQ9 TOTAL SCORE: 2  CINDI-7 (Submitted on 8/21/2023)  CINDI 7 TOTAL SCORE: 3

## 2023-11-04 ENCOUNTER — MYC MEDICAL ADVICE (OUTPATIENT)
Dept: FAMILY MEDICINE | Facility: OTHER | Age: 23
End: 2023-11-04
Payer: COMMERCIAL

## 2023-11-09 ENCOUNTER — MEDICAL CORRESPONDENCE (OUTPATIENT)
Dept: HEALTH INFORMATION MANAGEMENT | Facility: OTHER | Age: 23
End: 2023-11-09
Payer: COMMERCIAL

## 2023-11-10 ENCOUNTER — OFFICE VISIT (OUTPATIENT)
Dept: FAMILY MEDICINE | Facility: OTHER | Age: 23
End: 2023-11-10
Attending: PHYSICIAN ASSISTANT
Payer: COMMERCIAL

## 2023-11-10 VITALS
SYSTOLIC BLOOD PRESSURE: 120 MMHG | HEART RATE: 84 BPM | WEIGHT: 202 LBS | DIASTOLIC BLOOD PRESSURE: 80 MMHG | TEMPERATURE: 97.4 F | OXYGEN SATURATION: 98 % | RESPIRATION RATE: 20 BRPM | BODY MASS INDEX: 34.49 KG/M2 | HEIGHT: 64 IN

## 2023-11-10 DIAGNOSIS — Z01.419 PAP TEST, AS PART OF ROUTINE GYNECOLOGICAL EXAMINATION: ICD-10-CM

## 2023-11-10 DIAGNOSIS — Z13.1 SCREENING FOR DIABETES MELLITUS: ICD-10-CM

## 2023-11-10 DIAGNOSIS — Z00.00 ROUTINE GENERAL MEDICAL EXAMINATION AT A HEALTH CARE FACILITY: Primary | ICD-10-CM

## 2023-11-10 DIAGNOSIS — Z13.220 SCREENING CHOLESTEROL LEVEL: ICD-10-CM

## 2023-11-10 DIAGNOSIS — Z82.49 FAMILY HISTORY OF ABDOMINAL AORTIC ANEURYSM: ICD-10-CM

## 2023-11-10 DIAGNOSIS — N89.8 VAGINAL ODOR: ICD-10-CM

## 2023-11-10 LAB
BACTERIAL VAGINOSIS VAG-IMP: NEGATIVE
CANDIDA DNA VAG QL NAA+PROBE: NOT DETECTED
CANDIDA GLABRATA / CANDIDA KRUSEI DNA: NOT DETECTED
T VAGINALIS DNA VAG QL NAA+PROBE: NOT DETECTED

## 2023-11-10 PROCEDURE — 0352U MULTIPLEX VAGINAL PANEL BY PCR: CPT | Mod: ZL | Performed by: PHYSICIAN ASSISTANT

## 2023-11-10 PROCEDURE — G0123 SCREEN CERV/VAG THIN LAYER: HCPCS | Performed by: PHYSICIAN ASSISTANT

## 2023-11-10 PROCEDURE — 99395 PREV VISIT EST AGE 18-39: CPT | Performed by: PHYSICIAN ASSISTANT

## 2023-11-10 ASSESSMENT — PATIENT HEALTH QUESTIONNAIRE - PHQ9
10. IF YOU CHECKED OFF ANY PROBLEMS, HOW DIFFICULT HAVE THESE PROBLEMS MADE IT FOR YOU TO DO YOUR WORK, TAKE CARE OF THINGS AT HOME, OR GET ALONG WITH OTHER PEOPLE: NOT DIFFICULT AT ALL
SUM OF ALL RESPONSES TO PHQ QUESTIONS 1-9: 2
SUM OF ALL RESPONSES TO PHQ QUESTIONS 1-9: 2

## 2023-11-10 ASSESSMENT — PAIN SCALES - GENERAL: PAINLEVEL: NO PAIN (0)

## 2023-11-10 NOTE — NURSING NOTE
"Chief Complaint   Patient presents with    Physical       Initial Ht 1.626 m (5' 4\")   LMP  (LMP Unknown)   Breastfeeding Yes   BMI 33.64 kg/m   Estimated body mass index is 33.64 kg/m  as calculated from the following:    Height as of this encounter: 1.626 m (5' 4\").    Weight as of 8/21/23: 88.9 kg (196 lb).  Medication Review: complete    The next two questions are to help us understand your food security.  If you are feeling you need any assistance in this area, we have resources available to support you today.          11/10/2023   SDOH- Food Insecurity   Within the past 12 months, did you worry that your food would run out before you got money to buy more? N   Within the past 12 months, did the food you bought just not last and you didn t have money to get more? N         Health Care Directive:  Patient does not have a Health Care Directive or Living Will: Discussed advance care planning with patient; however, patient declined at this time.    Amy Davis, LPN      "

## 2023-11-10 NOTE — PATIENT INSTRUCTIONS
"Healthy Strategies  Eat at least 3 meals a day and never skip breakfast.  Eat more slowly.  Decrease portion size.  Provide structure by using meal replacement bars or shakes, and/or low calorie frozen meals.  For good nutrition incorporate fruit, vegetables, whole grains, lean protein, and low-fat dairy.  Remove trigger foods from yourenvironment to avoid impulse eating.  Increase physical activity: get a pedometer and aim for 10,000 steps a day or 30-35 minutes of activity 5 days per week.  Weigh yourself daily or at least weekly.  Keep a record of what you eat and your activity.  Establish a support system such as afriend, group or program.    11. Read Viral Sherman's \"Eat to Live\". Remember it is important to have a minimum of 1200 calories a day, okay to use olive oil, 40 grams of fiber daily. No more than two servings (the size of your palm) of red meat a week.     Please consider the following general health recommendations:    Eat a quality diet (generally, low in simple sugars, starches, cholesterol and saturated fat.)    Please get 1200 mg of calcium in divided doses with 800 units vitamin D in your diet daily. Take supplements as needed to obtain full recommended amounts.     Stay physically active. Regular walking or other exercise is one of the best ways to minimize pain of arthritis; maintain independence and mobility; maintain bone strength; maintain conditioning of your heart. Find something you enjoy and a friend to do it with you.    Maintain ideal weight. Your Body mass index is Body mass index is 33.64 kg/m .. Generally a BMI of 20-25 is considered ideal. Overweight is defined as 25-30, obese is 30-35 and markedly obese is greater than 35.    Apply sun block (SPF 25 or greater) on exposed skin anytime you are out in the sun to prevent skin cancer.     Wear a seatbelt whenever you are in a car.    Obtain a flu shot every fall.    You should have a tetanus booster at least once every 10 " years.    Check blood sugar annually. Cholesterol annually unless you have had a normal level when last checked within 5 years.     I recommend that you have a general physical exam every year. You should have a pap test every 3 years between the ages of 21 and 30 and every 3-5 years between the ages of 30 and 65 depending on your test unless you have had previous abnormal pap smears, (in these cases the exams and PAP's should be done on a schedule as recommended by your primary care provider). If you have had hysterectomy in the past, your future Pap plan may be different.      Preventive Health Recommendations  Female Ages 21 to 25     Yearly exam:   See your health care provider every year in order to  Review health changes.   Discuss preventive care.    Review your medicines if your doctor has prescribed any.    You should be tested each year for STDs (sexually transmitted diseases).     Talk to your provider about how often you should have cholesterol testing.    Get a Pap test every three years. If you have an abnormal result, your doctor may have you test more often.    If you are at risk for diabetes, you should have a diabetes test (fasting glucose).     Shots:   Get a flu shot each year.   Get a tetanus shot every 10 years.   Consider getting the shot (vaccine) that prevents cervical cancer (Gardasil).    Nutrition:   Eat at least 5 servings of fruits and vegetables each day.  Eat whole-grain bread, whole-wheat pasta and brown rice instead of white grains and rice.  Get adequate Calcium and Vitamin D.     Lifestyle  Exercise at least 150 minutes a week each week (30 minutes a day, 5 days a week). This will help you control your weight and prevent disease.  Limit alcohol to one drink per day.  No smoking.   Wear sunscreen to prevent skin cancer.  See your dentist every six months for an exam and cleaning.

## 2023-11-10 NOTE — PROGRESS NOTES
SUBJECTIVE:   CC: Mya is an 23 year old who presents for preventive health visit.           History of Present Illness       Reason for visit:  Vaginal  Symptom onset:  1-2 weeks ago    She eats 2-3 servings of fruits and vegetables daily.She consumes 1 sweetened beverage(s) daily.She exercises with enough effort to increase her heart rate 9 or less minutes per day.  She exercises with enough effort to increase her heart rate 3 or less days per week.   She is taking medications regularly.      Today's PHQ-9 Score:       11/10/2023     7:55 AM   PHQ-9 SCORE   PHQ-9 Total Score MyChart 2 (Minimal depression)   PHQ-9 Total Score 2     No LMP recorded (lmp unknown). (Menstrual status: Postpartum).   Contraception: none  Risk for STI?: no  Last pap: 10/30/2020 - normal pap, repeated  Any hx of abnormal paps:  no  FH of early CA?: no  Cholesterol/DM concerns/screening: none  Tobacco?: no  Lung cancer screening: na  Calcium intake: yes  DEXA: na  Last mammo: na  Colonoscopy: na  Hepatitis C screen: 12/20/2022 -  negative  HIV screen: 12/20/2022 - negative   Immunizations: declines vaccines today.     Patient is history of recurrent BV.  She especially got back to vaginosis after pregnancy.  Has had to take her current medications over the last year.  Over the last few weeks she has noticed a repeat vaginal odor.  Tried vaginal metronidazole gel in the past which did not help.  Ended up having to take the oral medication.    Maternal grandmother was found to have an abdominal aortic aneurysm.  They recommended that the females in her family be screened.  Would like this ordered.  Mother has had to be screened.     Have you ever done Advance Care Planning? (For example, a Health Directive, POLST, or a discussion with a medical provider or your loved ones about your wishes): Yes, patient states has an Advance Care Planning document and will bring a copy to the clinic.    Social History     Tobacco Use    Smoking status:  Never    Smokeless tobacco: Never   Substance Use Topics    Alcohol use: Not Currently     Comment: occasional              No data to display              Reviewed orders with patient.  Reviewed health maintenance and updated orders accordingly - Yes  Labs reviewed in EPIC  BP Readings from Last 3 Encounters:   11/10/23 120/80   08/21/23 118/70   07/09/23 115/58    Wt Readings from Last 3 Encounters:   11/10/23 91.6 kg (202 lb)   08/21/23 88.9 kg (196 lb)   07/07/23 99.8 kg (220 lb)                  Patient Active Problem List   Diagnosis    Anxiety state    Encounter for triage in pregnant patient    Polyhydramnios affecting pregnancy in third trimester     Past Surgical History:   Procedure Laterality Date    NO HISTORY OF SURGERY         Social History     Tobacco Use    Smoking status: Never    Smokeless tobacco: Never   Substance Use Topics    Alcohol use: Not Currently     Comment: occasional     Family History   Problem Relation Age of Onset    Other - See Comments Mother         Psychiatric illness,depression and anxiety    Anxiety Disorder Father     Anxiety Disorder Maternal Grandmother     Aortic aneurysm Maternal Grandmother     Cancer Maternal Grandfather          Current Outpatient Medications   Medication Sig Dispense Refill    Prenatal Vit-Fe Fumarate-FA (PRENATAL MULTIVITAMIN W/IRON) 27-0.8 MG tablet Take 1 tablet by mouth daily 90 tablet 3     No Known Allergies  Recent Labs   Lab Test 06/26/23  1629 05/23/23  2121 02/21/23  1757 11/01/22  0842 08/24/22  1529 08/07/22  1151   ALT 20 23  --   --   --  17   CR 0.60 0.63   < > 0.80 0.77 0.84   GFRESTIMATED >90 >90   < > >90 >90 >90   POTASSIUM 4.1 3.4   < > 3.4* 3.6 3.8   TSH  --   --   --  1.40 0.85  --     < > = values in this interval not displayed.        Breast Cancer Screening:        History of abnormal Pap smear: Last 3 Pap and HPV Results:       10/30/2020     8:39 AM   PAP / HPV   PAP (Historical) NIL          10/30/2020     8:39 AM   PAP /  "HPV   PAP (Historical) NIL      Reviewed and updated as needed this visit by clinical staff   Tobacco  Allergies  Meds  Problems  Med Hx  Surg Hx  Fam Hx  Soc   Hx        Reviewed and updated as needed this visit by Provider   Tobacco  Allergies  Meds  Problems  Med Hx  Surg Hx  Fam Hx         Past Medical History:   Diagnosis Date    Anxiety disorder     Jaundice     Migraines     Pneumonia     History of left lower lobe pneumonia times two.    Varicella       Past Surgical History:   Procedure Laterality Date    NO HISTORY OF SURGERY       OB History    Para Term  AB Living   1 1 1 0 0 1   SAB IAB Ectopic Multiple Live Births   0 0 0 0 1      # Outcome Date GA Lbr [place]/2nd Weight Sex Delivery Anes PTL Lv   1 Term 23 39w0d 03:42 / 02:10 3.093 kg (6 lb 13.1 oz) F Vag-Vacuum EPI N ROGER      Name: ERIK MALHOTRA      Apgar1: 8  Apgar5: 9       Review of Systems  CONSTITUTIONAL: NEGATIVE for fever, chills, change in weight  INTEGUMENTARU/SKIN: NEGATIVE for worrisome rashes, moles or lesions  EYES: NEGATIVE for vision changes or irritation  ENT: NEGATIVE for ear, mouth and throat problems  RESP: NEGATIVE for significant cough or SOB  BREAST: NEGATIVE for masses, tenderness or discharge  CV: NEGATIVE for chest pain, palpitations or peripheral edema  GI: NEGATIVE for nausea, abdominal pain, heartburn, or change in bowel habits  : NEGATIVE for unusual urinary or vaginal symptoms. Periods are regular.  MUSCULOSKELETAL: NEGATIVE for significant arthralgias or myalgia  NEURO: NEGATIVE for weakness, dizziness or paresthesias  PSYCHIATRIC: NEGATIVE for changes in mood or affect     OBJECTIVE:   /80   Pulse 84   Temp 97.4  F (36.3  C) (Tympanic)   Resp 20   Ht 1.626 m (5' 4\")   Wt 91.6 kg (202 lb)   LMP  (LMP Unknown)   SpO2 98%   Breastfeeding Yes   BMI 34.67 kg/m    Physical Exam  GENERAL: healthy, alert and no distress  EYES: Eyes grossly normal to inspection, PERRL and " conjunctivae and sclerae normal  HENT: ear canals and TM's normal, nose and mouth without ulcers or lesions  NECK: no adenopathy, no asymmetry, masses, or scars and thyroid normal to palpation  RESP: lungs clear to auscultation - no rales, rhonchi or wheezes  CV: regular rate and rhythm, normal S1 S2, no S3 or S4, no murmur, click or rub, no peripheral edema and peripheral pulses strong  ABDOMEN: soft, nontender, no hepatosplenomegaly, no masses and bowel sounds normal   (female): normal female external genitalia, normal urethral meatus, vaginal mucosa pink, moist, well rugated, and normal cervix/adnexa/uterus without masses or discharge  Pap completed: yes  MS: no gross musculoskeletal defects noted, no edema  SKIN: no suspicious lesions or rashes  NEURO: Normal strength and tone, mentation intact and speech normal  PSYCH: mentation appears normal, affect normal/bright    Diagnostic Test Results:  Labs reviewed in Epic    ASSESSMENT/PLAN:       ICD-10-CM    1. Routine general medical examination at a health care facility  Z00.00       2. Family history of abdominal aortic aneurysm  Z82.49 US Abdominal Aorta Imaging      3. Pap test, as part of routine gynecological examination  Z01.419 Pap Screen Only - recommended age 21 - 24 years      4. Screening cholesterol level  Z13.220 Lipid Panel      5. Screening for diabetes mellitus  Z13.1 Basic Metabolic Panel      6. Vaginal odor  N89.8 Multiplex Vaginal Panel by PCR        Family history of abdominal aortic aneurysm: Ordered ultrasound of the abdominal aorta to rule out concerns.    Completed Pap for cervical cancer screening.    Ordered lipid panel and BMP for cholesterol and diabetes screening. Will set up fasting lab only appointment.     Completed vaginal wet prep due to vaginal odor.  Results are pending.    Repeat physical in 1 year.     See patient education.     Patient has been advised of split billing requirements and indicates understanding:  "Yes      COUNSELING:  Reviewed preventive health counseling, as reflected in patient instructions       Regular exercise       Healthy diet/nutrition       Vision screening       Hearing screening       Pneumococcal Vaccine          Contraception       Family planning       Osteoporosis prevention/bone health      BMI:   Estimated body mass index is 34.67 kg/m  as calculated from the following:    Height as of this encounter: 1.626 m (5' 4\").    Weight as of this encounter: 91.6 kg (202 lb).   Weight management plan: Discussed healthy diet and exercise guidelines      She reports that she has never smoked. She has never used smokeless tobacco.          Gemini Alvarado PA-C  [facility] AND [facility]  "

## 2023-11-15 LAB
BKR LAB AP GYN ADEQUACY: NORMAL
BKR LAB AP GYN INTERPRETATION: NORMAL
BKR LAB AP HPV REFLEX: NO
BKR LAB AP PREVIOUS ABNORMAL: NORMAL
PATH REPORT.COMMENTS IMP SPEC: NORMAL
PATH REPORT.COMMENTS IMP SPEC: NORMAL
PATH REPORT.RELEVANT HX SPEC: NORMAL

## 2023-12-01 ENCOUNTER — HOSPITAL ENCOUNTER (OUTPATIENT)
Dept: ULTRASOUND IMAGING | Facility: OTHER | Age: 23
Discharge: HOME | End: 2023-12-01
Attending: PHYSICIAN ASSISTANT | Admitting: PHYSICIAN ASSISTANT
Payer: COMMERCIAL

## 2023-12-01 DIAGNOSIS — Z82.49 FAMILY HISTORY OF ABDOMINAL AORTIC ANEURYSM: ICD-10-CM

## 2023-12-01 PROCEDURE — 76775 US EXAM ABDO BACK WALL LIM: CPT

## 2023-12-19 ENCOUNTER — MYC MEDICAL ADVICE (OUTPATIENT)
Dept: FAMILY MEDICINE | Facility: OTHER | Age: 23
End: 2023-12-19
Payer: COMMERCIAL

## 2023-12-19 DIAGNOSIS — Z00.00 PREVENTATIVE HEALTH CARE: Primary | ICD-10-CM

## 2023-12-19 DIAGNOSIS — R53.83 FATIGUE, UNSPECIFIED TYPE: ICD-10-CM

## 2023-12-26 ENCOUNTER — LAB (OUTPATIENT)
Dept: LAB | Facility: OTHER | Age: 23
End: 2023-12-26
Payer: COMMERCIAL

## 2023-12-26 DIAGNOSIS — Z13.1 SCREENING FOR DIABETES MELLITUS: ICD-10-CM

## 2023-12-26 DIAGNOSIS — Z00.00 PREVENTATIVE HEALTH CARE: ICD-10-CM

## 2023-12-26 DIAGNOSIS — Z13.220 SCREENING CHOLESTEROL LEVEL: ICD-10-CM

## 2023-12-26 DIAGNOSIS — R53.83 FATIGUE, UNSPECIFIED TYPE: ICD-10-CM

## 2023-12-26 LAB
FERRITIN SERPL-MCNC: 60 NG/ML (ref 6–175)
IRON BINDING CAPACITY (ROCHE): 284 UG/DL (ref 240–430)
IRON SATN MFR SERPL: 16 % (ref 15–46)
IRON SERPL-MCNC: 46 UG/DL (ref 37–145)

## 2023-12-26 PROCEDURE — 82728 ASSAY OF FERRITIN: CPT | Mod: ZL

## 2023-12-26 PROCEDURE — 36415 COLL VENOUS BLD VENIPUNCTURE: CPT | Mod: ZL

## 2023-12-26 PROCEDURE — 83550 IRON BINDING TEST: CPT | Mod: ZL

## 2023-12-26 PROCEDURE — 80061 LIPID PANEL: CPT | Mod: ZL

## 2023-12-26 PROCEDURE — 80048 BASIC METABOLIC PNL TOTAL CA: CPT | Mod: ZL

## 2023-12-27 LAB
ANION GAP SERPL CALCULATED.3IONS-SCNC: 11 MMOL/L (ref 7–15)
BUN SERPL-MCNC: 16.6 MG/DL (ref 6–20)
CALCIUM SERPL-MCNC: 9.3 MG/DL (ref 8.6–10)
CHLORIDE SERPL-SCNC: 103 MMOL/L (ref 98–107)
CHOLEST SERPL-MCNC: 175 MG/DL
CREAT SERPL-MCNC: 0.81 MG/DL (ref 0.51–0.95)
DEPRECATED HCO3 PLAS-SCNC: 26 MMOL/L (ref 22–29)
EGFRCR SERPLBLD CKD-EPI 2021: >90 ML/MIN/1.73M2
GLUCOSE SERPL-MCNC: 91 MG/DL (ref 70–99)
HDLC SERPL-MCNC: 55 MG/DL
LDLC SERPL CALC-MCNC: 78 MG/DL
NONHDLC SERPL-MCNC: 120 MG/DL
POTASSIUM SERPL-SCNC: 4.2 MMOL/L (ref 3.4–5.3)
SODIUM SERPL-SCNC: 140 MMOL/L (ref 135–145)
TRIGL SERPL-MCNC: 212 MG/DL

## 2024-01-09 ENCOUNTER — MEDICAL CORRESPONDENCE (OUTPATIENT)
Dept: HEALTH INFORMATION MANAGEMENT | Facility: OTHER | Age: 24
End: 2024-01-09
Payer: COMMERCIAL

## 2024-01-11 ENCOUNTER — MYC MEDICAL ADVICE (OUTPATIENT)
Dept: FAMILY MEDICINE | Facility: OTHER | Age: 24
End: 2024-01-11
Payer: COMMERCIAL

## 2024-01-23 ENCOUNTER — OFFICE VISIT (OUTPATIENT)
Dept: FAMILY MEDICINE | Facility: OTHER | Age: 24
End: 2024-01-23
Attending: PHYSICIAN ASSISTANT
Payer: COMMERCIAL

## 2024-01-23 VITALS
HEART RATE: 88 BPM | SYSTOLIC BLOOD PRESSURE: 104 MMHG | TEMPERATURE: 97.9 F | DIASTOLIC BLOOD PRESSURE: 75 MMHG | RESPIRATION RATE: 16 BRPM | BODY MASS INDEX: 34.31 KG/M2 | WEIGHT: 201 LBS | OXYGEN SATURATION: 99 % | HEIGHT: 64 IN

## 2024-01-23 DIAGNOSIS — G43.909 MIGRAINE WITHOUT STATUS MIGRAINOSUS, NOT INTRACTABLE, UNSPECIFIED MIGRAINE TYPE: Primary | ICD-10-CM

## 2024-01-23 PROBLEM — O40.3XX0 POLYHYDRAMNIOS AFFECTING PREGNANCY IN THIRD TRIMESTER: Status: RESOLVED | Noted: 2023-07-06 | Resolved: 2024-01-23

## 2024-01-23 PROBLEM — Z36.89 ENCOUNTER FOR TRIAGE IN PREGNANT PATIENT: Status: RESOLVED | Noted: 2023-05-23 | Resolved: 2024-01-23

## 2024-01-23 LAB
ESTRADIOL SERPL-MCNC: 30 PG/ML
MAGNESIUM SERPL-MCNC: 1.9 MG/DL (ref 1.7–2.3)
TSH SERPL DL<=0.005 MIU/L-ACNC: 0.82 UIU/ML (ref 0.3–4.2)
VIT D+METAB SERPL-MCNC: 30 NG/ML (ref 20–50)

## 2024-01-23 PROCEDURE — 83735 ASSAY OF MAGNESIUM: CPT | Mod: ZL | Performed by: PHYSICIAN ASSISTANT

## 2024-01-23 PROCEDURE — 36415 COLL VENOUS BLD VENIPUNCTURE: CPT | Mod: ZL | Performed by: PHYSICIAN ASSISTANT

## 2024-01-23 PROCEDURE — 82670 ASSAY OF TOTAL ESTRADIOL: CPT | Mod: ZL | Performed by: PHYSICIAN ASSISTANT

## 2024-01-23 PROCEDURE — 99213 OFFICE O/P EST LOW 20 MIN: CPT | Performed by: PHYSICIAN ASSISTANT

## 2024-01-23 PROCEDURE — 84443 ASSAY THYROID STIM HORMONE: CPT | Mod: ZL | Performed by: PHYSICIAN ASSISTANT

## 2024-01-23 PROCEDURE — 82306 VITAMIN D 25 HYDROXY: CPT | Mod: ZL | Performed by: PHYSICIAN ASSISTANT

## 2024-01-23 ASSESSMENT — ENCOUNTER SYMPTOMS: HEADACHES: 1

## 2024-01-23 ASSESSMENT — PAIN SCALES - GENERAL: PAINLEVEL: NO PAIN (0)

## 2024-01-23 NOTE — PROGRESS NOTES
Assessment & Plan   Problem List Items Addressed This Visit    None  Visit Diagnoses       Migraine without status migrainosus, not intractable, unspecified migraine type    -  Primary    Relevant Orders    TSH Reflex GH (Completed)    Estradiol (Completed)    Magnesium (Completed)    Vitamin D Total    Ob/Gyn  Referral           Migraines: Discussed symptoms and concerns at length.  Discussed possible start of a daily migraine prevention medication however patient declined at this time.  With like to continue to use over-the-counter supplements for treatment as needed.  Will complete a thorough workup including TSH, estradiol, magnesium, and vitamin D.  Refer to OB/GYN for consult to discuss migraines.  Encouraged to take ibuprofen for relief up to 4 times per day.  Encouraged rest and elevation.  Encouraged to use ice or heat 15 minutes at a time several times per day to decrease pain.  Encourage stretching, chiropractor, massage, and yoga as needed.  Return to clinic with any change or worsening of symptoms.       Ordering of each unique test     See Patient Instructions    No follow-ups on file.      Lita Roque is a 23 year old, presenting for the following health issues:  Headache    History of Present Illness       Reason for visit:  Blood labshormones headaches    She eats 4 or more servings of fruits and vegetables daily.She consumes 1 sweetened beverage(s) daily.She exercises with enough effort to increase her heart rate 30 to 60 minutes per day.  She exercises with enough effort to increase her heart rate 4 days per week.   She is taking medications regularly.     Patient is coming today to discuss her migraines.  When she was recently pregnant she ended up having low progesterone.  And to start taking progesterone for a bit.  Did not have a migraine throughout her pregnancy.  Currently getting migraines weekly.  Unsure if this is due to lack of sleep with the baby.  Interested in  "having her normal testing.  Gained a lot of weight with pregnancy.  Trying to eat clean and is breast-feeding.  Having a harder time losing weight.  Currently taking supplements and attempts to help decrease migraines.  Has been going to the chiropractor twice a week which has been helping her migraines.  They are now adjusting her neck which has been helping.  Gets headaches which stem from the neck.  Temple massages help.  Currently taking magnesium LMNT packets.      Review of Systems  Constitutional, HEENT, cardiovascular, pulmonary, gi and gu systems are negative, except as otherwise noted.      Objective    /75   Pulse 88   Temp 97.9  F (36.6  C) (Tympanic)   Resp 16   Ht 1.619 m (5' 3.75\")   Wt 91.2 kg (201 lb)   SpO2 99%   BMI 34.77 kg/m    Body mass index is 34.77 kg/m .  Physical Exam  Vitals and nursing note reviewed.   Constitutional:       Appearance: Normal appearance.   HENT:      Head: Normocephalic and atraumatic.   Musculoskeletal:         General: Normal range of motion.      Cervical back: Normal range of motion.   Skin:     General: Skin is warm and dry.      Findings: No rash.   Neurological:      General: No focal deficit present.      Mental Status: She is alert and oriented to person, place, and time.   Psychiatric:         Mood and Affect: Mood normal.         Behavior: Behavior normal.            Signed Electronically by: Gemini Alvarado PA-C    "

## 2024-05-23 ENCOUNTER — OFFICE VISIT (OUTPATIENT)
Dept: OBGYN | Facility: OTHER | Age: 24
End: 2024-05-23
Attending: PHYSICIAN ASSISTANT
Payer: COMMERCIAL

## 2024-05-23 VITALS — HEART RATE: 72 BPM | DIASTOLIC BLOOD PRESSURE: 68 MMHG | SYSTOLIC BLOOD PRESSURE: 122 MMHG

## 2024-05-23 DIAGNOSIS — Z31.69 ENCOUNTER FOR PRECONCEPTION CONSULTATION: Primary | ICD-10-CM

## 2024-05-23 PROCEDURE — 99214 OFFICE O/P EST MOD 30 MIN: CPT | Performed by: OBSTETRICS & GYNECOLOGY

## 2024-05-23 ASSESSMENT — PAIN SCALES - GENERAL: PAINLEVEL: NO PAIN (0)

## 2024-05-23 NOTE — NURSING NOTE
Chief Complaint   Patient presents with    Consult     Pre Pregnancy Discussion and Migraines       Medication Reconciliation: complete      Shivani Luna LPN........................5/23/2024  9:43 AM

## 2024-05-23 NOTE — NURSING NOTE
"Chief Complaint   Patient presents with    Consult     Pre Pregnancy Discussion and Migraines    dymenorrhea     Dysmenorrhea: Patient 8 months pp; symptoms began 4 months ago.        Initial /68 (BP Location: Right arm, Patient Position: Sitting, Cuff Size: Adult Large)   Pulse 72   LMP 05/10/2024   Breastfeeding Yes  Estimated body mass index is 34.77 kg/m  as calculated from the following:    Height as of 1/23/24: 1.619 m (5' 3.75\").    Weight as of 1/23/24: 91.2 kg (201 lb).  Medication Reconciliation: complete    Huong Crespo, ROGELIO    "

## 2024-05-24 NOTE — PROGRESS NOTES
Follow-Up Visit    S: Ms. Mya Gant is a 23 year old  here for pre-conception discussion. Her first pregnancy was complicated by a very early miscarriage. Since then, she has been experiencing a lot of health anxiety. Her second pregnancy, she inquired about progesterone supplementation and was given a prescription for it to use, but didn't receive any additional information about it or when to stop it. During her pregnancy, she felt like her visits didn't focus on her questions and would leave feeling like she still had questions or needed more explanation. [place] like she was relying on Google a lot. Today, she has questions regarding progesterone supplementation and would like to have a plan she feels comfortable with prior to attempting pregnancy again. Not planning to start trying for pregnancy for a few months as she is still breastfeeding but open to pregnancy anytime    O:  /68 (BP Location: Right arm, Patient Position: Sitting, Cuff Size: Adult Large)   Pulse 72   LMP 05/10/2024   Breastfeeding Yes   Gen: Well-appearing, NAD  Pulm: nonlabored  Psych: appropriate mood and affect    A/P:  Ms. Mya Gant is a 23 year old  here for pre-conception discussion. Listened to all of patient's concerns and reviewed the literature regarding progesterone supplementation in the setting of early pregnancy loss, particularly that she only had one prior miscarriage. She is leaning toward not doing progesterone supplementation again in her next pregnancy given lack of data supporting its use. Discussed how prenatal visits will be structured and encouraged her to feel empowered to ask her questions during visits. She voiced agreement with this plan of care.     Total time spent 30 minutes in pre-visit planning, counseling, face-to-face exam, and documentation     Padma French MD  OB/GYN

## 2024-06-11 ENCOUNTER — MYC MEDICAL ADVICE (OUTPATIENT)
Dept: FAMILY MEDICINE | Facility: OTHER | Age: 24
End: 2024-06-11
Payer: COMMERCIAL

## 2024-06-11 DIAGNOSIS — G43.909 MIGRAINE WITHOUT STATUS MIGRAINOSUS, NOT INTRACTABLE, UNSPECIFIED MIGRAINE TYPE: Primary | ICD-10-CM

## 2024-06-11 NOTE — TELEPHONE ENCOUNTER
Per OV from 1/23/24:    Migraines: Discussed symptoms and concerns at length.  Discussed possible start of a daily migraine prevention medication however patient declined at this time.  With like to continue to use over-the-counter supplements for treatment as needed.  Will complete a thorough workup including TSH, estradiol, magnesium, and vitamin D.  Refer to OB/GYN for consult to discuss migraines.  Encouraged to take ibuprofen for relief up to 4 times per day.  Encouraged rest and elevation.  Encouraged to use ice or heat 15 minutes at a time several times per day to decrease pain.  Encourage stretching, chiropractor, massage, and yoga as needed.  Return to clinic with any change or worsening of symptoms.     Patient is coming today to discuss her migraines. When she was recently pregnant she ended up having low progesterone. And to start taking progesterone for a bit. Did not have a migraine throughout her pregnancy. Currently getting migraines weekly. Unsure if this is due to lack of sleep with the baby. Interested in having her normal testing. Gained a lot of weight with pregnancy. Trying to eat clean and is breast-feeding. Having a harder time losing weight. Currently taking supplements and attempts to help decrease migraines. Has been going to the chiropractor twice a week which has been helping her migraines. They are now adjusting her neck which has been helping. Gets headaches which stem from the neck. Temple massages help. Currently taking magnesium LMNT packets.     Janeth Brewer RN on 6/11/2024 at 4:50 PM

## 2024-06-19 ENCOUNTER — HOSPITAL ENCOUNTER (OUTPATIENT)
Dept: MRI IMAGING | Facility: OTHER | Age: 24
Discharge: HOME | End: 2024-06-19
Attending: PHYSICIAN ASSISTANT | Admitting: PHYSICIAN ASSISTANT
Payer: COMMERCIAL

## 2024-06-19 DIAGNOSIS — G43.909 MIGRAINE WITHOUT STATUS MIGRAINOSUS, NOT INTRACTABLE, UNSPECIFIED MIGRAINE TYPE: ICD-10-CM

## 2024-06-19 PROCEDURE — 70551 MRI BRAIN STEM W/O DYE: CPT

## 2024-11-04 ENCOUNTER — LAB (OUTPATIENT)
Dept: LAB | Facility: OTHER | Age: 24
End: 2024-11-04
Attending: OBSTETRICS & GYNECOLOGY
Payer: COMMERCIAL

## 2024-11-04 ENCOUNTER — VIRTUAL VISIT (OUTPATIENT)
Dept: OBGYN | Facility: OTHER | Age: 24
End: 2024-11-04
Attending: OBSTETRICS & GYNECOLOGY
Payer: COMMERCIAL

## 2024-11-04 DIAGNOSIS — O36.80X0 ENCOUNTER TO DETERMINE FETAL VIABILITY OF PREGNANCY, SINGLE OR UNSPECIFIED FETUS: Primary | ICD-10-CM

## 2024-11-04 DIAGNOSIS — O36.80X0 ENCOUNTER TO DETERMINE FETAL VIABILITY OF PREGNANCY, SINGLE OR UNSPECIFIED FETUS: ICD-10-CM

## 2024-11-04 LAB
HCG INTACT+B SERPL-ACNC: 603 MIU/ML
PROGEST SERPL-MCNC: 15.5 NG/ML

## 2024-11-04 PROCEDURE — 36415 COLL VENOUS BLD VENIPUNCTURE: CPT | Mod: ZL

## 2024-11-04 PROCEDURE — 84702 CHORIONIC GONADOTROPIN TEST: CPT | Mod: ZL

## 2024-11-04 PROCEDURE — 99207 PR OB VISIT-NO CHARGE - GICH ONLY: CPT | Mod: 93

## 2024-11-04 PROCEDURE — 84144 ASSAY OF PROGESTERONE: CPT | Mod: ZL

## 2024-11-04 ASSESSMENT — ANXIETY QUESTIONNAIRES
3. WORRYING TOO MUCH ABOUT DIFFERENT THINGS: SEVERAL DAYS
1. FEELING NERVOUS, ANXIOUS, OR ON EDGE: SEVERAL DAYS
GAD7 TOTAL SCORE: 6
IF YOU CHECKED OFF ANY PROBLEMS ON THIS QUESTIONNAIRE, HOW DIFFICULT HAVE THESE PROBLEMS MADE IT FOR YOU TO DO YOUR WORK, TAKE CARE OF THINGS AT HOME, OR GET ALONG WITH OTHER PEOPLE: SOMEWHAT DIFFICULT
7. FEELING AFRAID AS IF SOMETHING AWFUL MIGHT HAPPEN: NOT AT ALL
5. BEING SO RESTLESS THAT IT IS HARD TO SIT STILL: SEVERAL DAYS
2. NOT BEING ABLE TO STOP OR CONTROL WORRYING: SEVERAL DAYS
6. BECOMING EASILY ANNOYED OR IRRITABLE: SEVERAL DAYS
GAD7 TOTAL SCORE: 6

## 2024-11-04 ASSESSMENT — PATIENT HEALTH QUESTIONNAIRE - PHQ9
SUM OF ALL RESPONSES TO PHQ QUESTIONS 1-9: 5
5. POOR APPETITE OR OVEREATING: SEVERAL DAYS

## 2024-11-25 ENCOUNTER — HOSPITAL ENCOUNTER (OUTPATIENT)
Dept: ULTRASOUND IMAGING | Facility: OTHER | Age: 24
Discharge: HOME | End: 2024-11-25
Attending: OBSTETRICS & GYNECOLOGY | Admitting: OBSTETRICS & GYNECOLOGY
Payer: COMMERCIAL

## 2024-11-25 DIAGNOSIS — O36.80X0 ENCOUNTER TO DETERMINE FETAL VIABILITY OF PREGNANCY, SINGLE OR UNSPECIFIED FETUS: ICD-10-CM

## 2024-11-25 PROCEDURE — 76801 OB US < 14 WKS SINGLE FETUS: CPT

## 2024-11-26 ENCOUNTER — MYC MEDICAL ADVICE (OUTPATIENT)
Dept: OBGYN | Facility: OTHER | Age: 24
End: 2024-11-26
Payer: COMMERCIAL

## 2024-12-09 LAB
ABO/RH(D): NORMAL
ANTIBODY SCREEN: NEGATIVE
SPECIMEN EXPIRATION DATE: NORMAL

## 2024-12-10 ENCOUNTER — PRENATAL OFFICE VISIT (OUTPATIENT)
Dept: OBGYN | Facility: OTHER | Age: 24
End: 2024-12-10
Attending: OBSTETRICS & GYNECOLOGY
Payer: COMMERCIAL

## 2024-12-10 VITALS
HEIGHT: 65 IN | HEART RATE: 100 BPM | SYSTOLIC BLOOD PRESSURE: 122 MMHG | DIASTOLIC BLOOD PRESSURE: 80 MMHG | BODY MASS INDEX: 27.66 KG/M2 | WEIGHT: 166 LBS

## 2024-12-10 DIAGNOSIS — Z34.81 ENCOUNTER FOR SUPERVISION OF OTHER NORMAL PREGNANCY IN FIRST TRIMESTER: Primary | ICD-10-CM

## 2024-12-10 DIAGNOSIS — N89.8 VAGINAL DISCHARGE: ICD-10-CM

## 2024-12-10 LAB
BACTERIAL VAGINOSIS VAG-IMP: NEGATIVE
BASOPHILS # BLD AUTO: 0 10E3/UL (ref 0–0.2)
BASOPHILS NFR BLD AUTO: 0 %
C TRACH DNA SPEC QL PROBE+SIG AMP: NEGATIVE
CANDIDA DNA VAG QL NAA+PROBE: NOT DETECTED
CANDIDA GLABRATA / CANDIDA KRUSEI DNA: NOT DETECTED
EOSINOPHIL # BLD AUTO: 0 10E3/UL (ref 0–0.7)
EOSINOPHIL NFR BLD AUTO: 0 %
ERYTHROCYTE [DISTWIDTH] IN BLOOD BY AUTOMATED COUNT: 13.2 % (ref 10–15)
HBV SURFACE AG SERPL QL IA: NONREACTIVE
HCT VFR BLD AUTO: 39.1 % (ref 35–47)
HCV AB SERPL QL IA: NONREACTIVE
HGB BLD-MCNC: 13.2 G/DL (ref 11.7–15.7)
IMM GRANULOCYTES # BLD: 0 10E3/UL
IMM GRANULOCYTES NFR BLD: 0 %
LYMPHOCYTES # BLD AUTO: 1.8 10E3/UL (ref 0.8–5.3)
LYMPHOCYTES NFR BLD AUTO: 21 %
MCH RBC QN AUTO: 28.8 PG (ref 26.5–33)
MCHC RBC AUTO-ENTMCNC: 33.8 G/DL (ref 31.5–36.5)
MCV RBC AUTO: 85 FL (ref 78–100)
MONOCYTES # BLD AUTO: 0.6 10E3/UL (ref 0–1.3)
MONOCYTES NFR BLD AUTO: 7 %
N GONORRHOEA DNA SPEC QL NAA+PROBE: NEGATIVE
NEUTROPHILS # BLD AUTO: 5.9 10E3/UL (ref 1.6–8.3)
NEUTROPHILS NFR BLD AUTO: 71 %
NRBC # BLD AUTO: 0 10E3/UL
NRBC BLD AUTO-RTO: 0 /100
PLATELET # BLD AUTO: 261 10E3/UL (ref 150–450)
RBC # BLD AUTO: 4.59 10E6/UL (ref 3.8–5.2)
RUBV IGG SERPL QL IA: 2.07 INDEX
RUBV IGG SERPL QL IA: POSITIVE
T PALLIDUM AB SER QL: NONREACTIVE
T VAGINALIS DNA VAG QL NAA+PROBE: NOT DETECTED
TSH SERPL DL<=0.005 MIU/L-ACNC: 0.8 UIU/ML (ref 0.3–4.2)
WBC # BLD AUTO: 8.3 10E3/UL (ref 4–11)

## 2024-12-10 PROCEDURE — 86762 RUBELLA ANTIBODY: CPT | Mod: ZL | Performed by: OBSTETRICS & GYNECOLOGY

## 2024-12-10 PROCEDURE — 87086 URINE CULTURE/COLONY COUNT: CPT | Mod: ZL | Performed by: OBSTETRICS & GYNECOLOGY

## 2024-12-10 PROCEDURE — 87591 N.GONORRHOEAE DNA AMP PROB: CPT | Mod: ZL | Performed by: OBSTETRICS & GYNECOLOGY

## 2024-12-10 PROCEDURE — 86803 HEPATITIS C AB TEST: CPT | Mod: ZL | Performed by: OBSTETRICS & GYNECOLOGY

## 2024-12-10 PROCEDURE — 0352U MULTIPLEX VAGINAL PANEL BY PCR: CPT | Mod: ZL | Performed by: OBSTETRICS & GYNECOLOGY

## 2024-12-10 PROCEDURE — 84443 ASSAY THYROID STIM HORMONE: CPT | Mod: ZL | Performed by: OBSTETRICS & GYNECOLOGY

## 2024-12-10 PROCEDURE — 85025 COMPLETE CBC W/AUTO DIFF WBC: CPT | Mod: ZL | Performed by: OBSTETRICS & GYNECOLOGY

## 2024-12-10 PROCEDURE — 87340 HEPATITIS B SURFACE AG IA: CPT | Mod: ZL | Performed by: OBSTETRICS & GYNECOLOGY

## 2024-12-10 PROCEDURE — 87389 HIV-1 AG W/HIV-1&-2 AB AG IA: CPT | Mod: ZL | Performed by: OBSTETRICS & GYNECOLOGY

## 2024-12-10 PROCEDURE — 87491 CHLMYD TRACH DNA AMP PROBE: CPT | Mod: ZL | Performed by: OBSTETRICS & GYNECOLOGY

## 2024-12-10 PROCEDURE — 86900 BLOOD TYPING SEROLOGIC ABO: CPT | Performed by: OBSTETRICS & GYNECOLOGY

## 2024-12-10 PROCEDURE — 86850 RBC ANTIBODY SCREEN: CPT | Performed by: OBSTETRICS & GYNECOLOGY

## 2024-12-10 PROCEDURE — 86780 TREPONEMA PALLIDUM: CPT | Mod: ZL | Performed by: OBSTETRICS & GYNECOLOGY

## 2024-12-10 PROCEDURE — 36415 COLL VENOUS BLD VENIPUNCTURE: CPT | Mod: ZL | Performed by: OBSTETRICS & GYNECOLOGY

## 2024-12-10 RX ORDER — LACTOBACILLUS RHAMNOSUS GG 10B CELL
1 CAPSULE ORAL DAILY
COMMUNITY

## 2024-12-10 RX ORDER — CHLORAL HYDRATE 500 MG
2 CAPSULE ORAL DAILY
COMMUNITY

## 2024-12-10 ASSESSMENT — PAIN SCALES - GENERAL: PAINLEVEL_OUTOF10: NO PAIN (0)

## 2024-12-10 NOTE — PROGRESS NOTES
New Obstetrics Visit    HPI: 24 year old  at 9w4d by 7w3d US here today for initial OB visit. This was a unplanned pregnancy, but welcome. Stopped trying as she was working on weight loss and conceived. Only had 3 cycles after she stopped breastfeeding, all 31+ days. During this pregnancy, she has been nauseated but no vomiting. More food aversions than her last pregnancy.  No cramping or VB.  She has been having some shortness of breath but no palpitations or chest pain. She has increased vaginal discharge but no odor or irritative symptoms.     OBHx  OB History    Para Term  AB Living   3 1 1 0 1 1   SAB IAB Ectopic Multiple Live Births   1 0 0 0 1      # Outcome Date GA Lbr [place]/2nd Weight Sex Type Anes PTL Lv   3 Current            2 Term 23 39w0d 03:42 / 02:10 3.093 kg (6 lb 13.1 oz) F Vag-Vacuum EPI N ROGER      Name: ERIK MALHOTRA      Apgar1: 8  Apgar5: 9   1 SAB 2022     SAB          PMHx:   Past Medical History:   Diagnosis Date    Anxiety disorder     Jaundice     Migraines     Pneumonia     History of left lower lobe pneumonia times two.    Polyhydramnios affecting pregnancy in third trimester     Varicella       PSHx:   Past Surgical History:   Procedure Laterality Date    NO HISTORY OF SURGERY        Meds:   Current Outpatient Medications   Medication Sig Dispense Refill    fish oil-omega-3 fatty acids 1000 MG capsule Take 2 g by mouth daily.      lactobacillus rhamnosus, GG, (CULTURELL) capsule Take 1 capsule by mouth daily.      Prenatal Vit-Fe Fumarate-FA (PRENATAL MULTIVITAMIN W/IRON) 27-0.8 MG tablet Take 1 tablet by mouth daily 90 tablet 3     No current facility-administered medications for this visit.     Allergies:   No Known Allergies    SocHx:   Social History     Tobacco Use    Smoking status: Never    Smokeless tobacco: Never   Vaping Use    Vaping status: Never Used   Substance Use Topics    Alcohol use: Not Currently     Comment: occasional    Drug use:  "Never     , SAHM    FamHx:   Family History   Problem Relation Age of Onset    Other - See Comments Mother         Psychiatric illness,depression and anxiety    Anxiety Disorder Father     Anxiety Disorder Maternal Grandmother     Aortic aneurysm Maternal Grandmother     Cancer Maternal Grandfather         ROS: 10-Point ROS negative except as noted in HPI      Physical Exam  /80 (BP Location: Right arm, Patient Position: Sitting, Cuff Size: Adult Regular)   Pulse 100   Ht 1.651 m (5' 5\")   Wt 75.3 kg (166 lb)   LMP 2024   Breastfeeding No   BMI 27.62 kg/m    Body mass index is 27.62 kg/m .  Gen: Well-appearing, NAD  HEENT: Normocephalic, atraumatic  Neck: Thyroid is not enlarged, no appreciable masses palpated. Non-tender  CV:  RRR, no m/r/g auscultated  Pulm: CTAB, no w/r/r auscultated  Abd: Soft, non-tender, non-distended  Ext: No LE edema, extremities warm and well perfused    Pelvic:  Normal appearing external female genitalia. No vaginal lesions. Moderate white discharge. Cervix normal, no lesions. Uterus is small, mobile, non-tender. No adnexal tenderness or masses    MVP collected    Assessment/Plan:  Ms. Mya Gant is a 24 year old  at 9w4d by 7w3d US, here for new OB visit.   1. Hx of polyhydramnios: plan US at 32 weeks  2. Vaginal discharge: MVP negative  3. Shortness of breath: normal cardiac and respiratory exam. Will get TSH. If persistent, encouraged patient to notify clinic for further evaluation.    4. PNC: New OB Labs ord'd. Known Rh negative- considering no Rhogam due to concerns it could be contaminated with COVID vaccine  5. Genetics: negative carrier screen , desires aneuploidy screen- plans to return at 10 weeks for lab  6. Imaging: dating US at 7w3d  7. Immunizations: declines flu    Follow up in 4 weeks.    Padma French MD  OB/GYN  12/10/2024 8:49 AM     "

## 2024-12-10 NOTE — NURSING NOTE
Chief Complaint   Patient presents with    Prenatal Care     OBPX        Medication Reconciliation: complete      Pt presents to clinic today for prenatal care has been having nausea not vomiting but just hasn't felt well with some shortness of breath.   Shivani Luna LPN........................12/10/2024  8:19 AM

## 2024-12-11 LAB
BACTERIA UR CULT: NORMAL
HIV 1+2 AB+HIV1 P24 AG SERPL QL IA: NONREACTIVE

## 2024-12-13 ENCOUNTER — LAB (OUTPATIENT)
Dept: LAB | Facility: OTHER | Age: 24
End: 2024-12-13
Attending: OBSTETRICS & GYNECOLOGY
Payer: COMMERCIAL

## 2024-12-13 DIAGNOSIS — Z34.81 ENCOUNTER FOR SUPERVISION OF OTHER NORMAL PREGNANCY IN FIRST TRIMESTER: ICD-10-CM

## 2024-12-13 PROCEDURE — 36415 COLL VENOUS BLD VENIPUNCTURE: CPT | Mod: ZL

## 2024-12-26 ENCOUNTER — TELEPHONE (OUTPATIENT)
Dept: FAMILY MEDICINE | Facility: OTHER | Age: 24
End: 2024-12-26
Payer: COMMERCIAL

## 2024-12-26 NOTE — TELEPHONE ENCOUNTER
Received fax from [employer]. Results reviewed by JOSE ANTONIO Hayes CNP who stated noninvasive prenatal screening came back low risk for trisomy 13, 18 &21 and gender is available.

## 2024-12-26 NOTE — TELEPHONE ENCOUNTER
Call placed to [facility] regarding the results. They stated that the prequel testing had resulted. Not able to see results online. They would fax over the results.    Nataliia Tellez RN on 12/26/2024 at 9:54 AM

## 2024-12-30 LAB — SCANNED LAB RESULT: NORMAL

## 2025-01-14 ENCOUNTER — PRENATAL OFFICE VISIT (OUTPATIENT)
Dept: OBGYN | Facility: OTHER | Age: 25
End: 2025-01-14
Attending: OBSTETRICS & GYNECOLOGY
Payer: COMMERCIAL

## 2025-01-14 VITALS
WEIGHT: 165 LBS | DIASTOLIC BLOOD PRESSURE: 68 MMHG | HEART RATE: 78 BPM | BODY MASS INDEX: 27.46 KG/M2 | SYSTOLIC BLOOD PRESSURE: 124 MMHG

## 2025-01-14 DIAGNOSIS — Z34.82 ENCOUNTER FOR SUPERVISION OF OTHER NORMAL PREGNANCY IN SECOND TRIMESTER: Primary | ICD-10-CM

## 2025-01-14 ASSESSMENT — PAIN SCALES - GENERAL: PAINLEVEL_OUTOF10: NO PAIN (0)

## 2025-01-14 NOTE — PROGRESS NOTES
Return OB Visit    S: Mya is feeling better- energy improving. Still nauseated but improving. No cramping or VB.     O: /68 (BP Location: Right arm, Patient Position: Sitting, Cuff Size: Adult Large)   Pulse 78   Wt 74.8 kg (165 lb)   LMP 2024   BMI 27.46 kg/m    Gen: Well-appearing, NAD  See OB Flowsheet    A/P:  Mya Gant is a 24 year old  at 14w4d by 7w3d US, here for return OB visit.  Hx of polyhydramnios: plan US at 32 weeks    PNC: Rh negative- considering no Rhogam due to concerns it could be contaminated with COVID vaccine, Rubella immune  Genetics: negative carrier screen . Aneuploidy screen normal this pregnancy  Imaging: dating US at 7w3d  Immunizations: declines flu  RTC 4-5 weeks with anatomy survey    Padma French MD FACOG  OB/GYN  2025 8:46 AM

## 2025-01-14 NOTE — NURSING NOTE
Chief Complaint   Patient presents with    Prenatal Care     14w4d       Medication Reconciliation: complete      Offers no concerns, morning nausea is getting better and much improved.   Shivani Luna LPN........................1/14/2025  8:55 AM

## 2025-01-22 ENCOUNTER — LAB (OUTPATIENT)
Dept: LAB | Facility: OTHER | Age: 25
End: 2025-01-22
Attending: OBSTETRICS & GYNECOLOGY
Payer: COMMERCIAL

## 2025-01-22 DIAGNOSIS — O21.9 EXCESSIVE VOMITING IN PREGNANCY: ICD-10-CM

## 2025-01-23 ENCOUNTER — HOSPITAL ENCOUNTER (EMERGENCY)
Facility: OTHER | Age: 25
Discharge: HOME | End: 2025-01-23
Attending: PHYSICIAN ASSISTANT | Admitting: PHYSICIAN ASSISTANT
Payer: COMMERCIAL

## 2025-01-23 ENCOUNTER — APPOINTMENT (OUTPATIENT)
Dept: LAB | Facility: OTHER | Age: 25
End: 2025-01-23
Payer: COMMERCIAL

## 2025-01-23 ENCOUNTER — LAB (OUTPATIENT)
Dept: LAB | Facility: OTHER | Age: 25
End: 2025-01-23
Payer: COMMERCIAL

## 2025-01-23 VITALS
SYSTOLIC BLOOD PRESSURE: 94 MMHG | BODY MASS INDEX: 27.49 KG/M2 | OXYGEN SATURATION: 100 % | RESPIRATION RATE: 20 BRPM | HEIGHT: 65 IN | DIASTOLIC BLOOD PRESSURE: 64 MMHG | TEMPERATURE: 98.1 F | WEIGHT: 165 LBS | HEART RATE: 74 BPM

## 2025-01-23 DIAGNOSIS — R11.2 NAUSEA AND VOMITING: ICD-10-CM

## 2025-01-23 DIAGNOSIS — K59.00 CONSTIPATION: ICD-10-CM

## 2025-01-23 DIAGNOSIS — R42 LIGHTHEADEDNESS: ICD-10-CM

## 2025-01-23 DIAGNOSIS — O21.9 EXCESSIVE VOMITING IN PREGNANCY: ICD-10-CM

## 2025-01-23 DIAGNOSIS — Z34.92 SECOND TRIMESTER PREGNANCY: ICD-10-CM

## 2025-01-23 LAB
FLUAV RNA SPEC QL NAA+PROBE: NEGATIVE
FLUBV RNA RESP QL NAA+PROBE: NEGATIVE
RSV RNA SPEC NAA+PROBE: NEGATIVE
SARS-COV-2 RNA RESP QL NAA+PROBE: NEGATIVE

## 2025-01-23 PROCEDURE — 87637 SARSCOV2&INF A&B&RSV AMP PRB: CPT | Performed by: PHYSICIAN ASSISTANT

## 2025-01-23 PROCEDURE — 96360 HYDRATION IV INFUSION INIT: CPT | Performed by: PHYSICIAN ASSISTANT

## 2025-01-23 PROCEDURE — 258N000003 HC RX IP 258 OP 636: Performed by: PHYSICIAN ASSISTANT

## 2025-01-23 PROCEDURE — 36415 COLL VENOUS BLD VENIPUNCTURE: CPT | Mod: ZL

## 2025-01-23 PROCEDURE — 87040 BLOOD CULTURE FOR BACTERIA: CPT | Mod: ZL

## 2025-01-23 PROCEDURE — 96361 HYDRATE IV INFUSION ADD-ON: CPT | Performed by: PHYSICIAN ASSISTANT

## 2025-01-23 PROCEDURE — 99284 EMERGENCY DEPT VISIT MOD MDM: CPT | Performed by: PHYSICIAN ASSISTANT

## 2025-01-23 PROCEDURE — 99283 EMERGENCY DEPT VISIT LOW MDM: CPT | Mod: 25 | Performed by: PHYSICIAN ASSISTANT

## 2025-01-23 RX ADMIN — SODIUM CHLORIDE 1000 ML: 0.9 INJECTION, SOLUTION INTRAVENOUS at 15:17

## 2025-01-23 RX ADMIN — SODIUM CHLORIDE 1000 ML: 0.9 INJECTION, SOLUTION INTRAVENOUS at 16:29

## 2025-01-23 ASSESSMENT — COLUMBIA-SUICIDE SEVERITY RATING SCALE - C-SSRS
2. HAVE YOU ACTUALLY HAD ANY THOUGHTS OF KILLING YOURSELF IN THE PAST MONTH?: NO
6. HAVE YOU EVER DONE ANYTHING, STARTED TO DO ANYTHING, OR PREPARED TO DO ANYTHING TO END YOUR LIFE?: NO
1. IN THE PAST MONTH, HAVE YOU WISHED YOU WERE DEAD OR WISHED YOU COULD GO TO SLEEP AND NOT WAKE UP?: NO

## 2025-01-23 ASSESSMENT — ACTIVITIES OF DAILY LIVING (ADL)
ADLS_ACUITY_SCORE: 53

## 2025-01-23 NOTE — ED PROVIDER NOTES
EMERGENCY DEPARTMENT ENCOUNTER      NAME: Mya Gant  AGE: 24 year old female  YOB: 2000  MRN: [id number]  EVALUATION DATE & TIME: 1/23/2025  2:50 PM    PCP: Gemini Alvarado    ED PROVIDER: Robi Martini PA-C       CHIEF COMPLAINT:  Chief Complaint   Patient presents with    Dehydration         HPI  Mya Gant is a pleasant 24 year old female who presents to the ER complaining of nausea vomiting during pregnancy.  Patient is currently roughly 15 weeks pregnant.  Patient states that she had exposure to a patient with known norovirus.  Patient has been having nausea and vomiting for the past 10 hours since Sunday night, roughly 4 days ago.  Patient now feeling dehydrated and constipated.  Abdomen feels firm but denies abdominal pain.  Decreased oral intake secondary to nausea and vomiting.  Patient presents to the ER today for IV fluids.      REVIEW OF SYSTEMS   Review of Systems  As above, otherwise ROS is unremarkable.      PAST MEDICAL HISTORY:  Past Medical History:   Diagnosis Date    Anxiety disorder     Jaundice     Migraines     Pneumonia     History of left lower lobe pneumonia times two.    Polyhydramnios affecting pregnancy in third trimester     Varicella          PAST SURGICAL HISTORY:  Past Surgical History:   Procedure Laterality Date    NO HISTORY OF SURGERY           CURRENT MEDICATIONS:    Current Outpatient Medications   Medication Instructions    fish oil-omega-3 fatty acids 2 g, DAILY    lactobacillus rhamnosus, GG, (CULTURELL) capsule 1 capsule, DAILY    Prenatal Vit-Fe Fumarate-FA (PRENATAL MULTIVITAMIN W/IRON) 27-0.8 MG tablet 1 tablet, Oral, DAILY         ALLERGIES:  No Known Allergies      FAMILY HISTORY:  Family History   Problem Relation Age of Onset    Other - See Comments Mother         Psychiatric illness,depression and anxiety    Anxiety Disorder Father     Anxiety Disorder Maternal Grandmother     Aortic aneurysm Maternal Grandmother     Cancer  Maternal Grandfather          SOCIAL HISTORY:   Social History     Socioeconomic History    Marital status:    Occupational History    Occupation: Stay at home mom   Tobacco Use    Smoking status: Never    Smokeless tobacco: Never   Vaping Use    Vaping status: Never Used   Substance and Sexual Activity    Alcohol use: Not Currently     Comment: occasional    Drug use: Never    Sexual activity: Yes     Partners: Male     Birth control/protection: None   Social History Narrative    No exposure to secondhand smoke.   Mom teaches  in [employer].    Family moved back from [place] the summer of 2007.    Mother   10/2011     Social Drivers of Health     Financial Resource Strain: Low Risk  (1/23/2024)    Financial Resource Strain     Within the past 12 months, have you or your family members you live with been unable to get utilities (heat, electricity) when it was really needed?: No   Food Insecurity: Low Risk  (1/23/2024)    Food Insecurity     Within the past 12 months, did you worry that your food would run out before you got money to buy more?: No     Within the past 12 months, did the food you bought just not last and you didn t have money to get more?: No   Transportation Needs: Low Risk  (1/23/2024)    Transportation Needs     Within the past 12 months, has lack of transportation kept you from medical appointments, getting your medicines, non-medical meetings or appointments, work, or from getting things that you need?: No   Interpersonal Safety: Low Risk  (11/4/2024)    Interpersonal Safety     Do you feel physically and emotionally safe where you currently live?: Yes     Within the past 12 months, have you been hit, slapped, kicked or otherwise physically hurt by someone?: No     Within the past 12 months, have you been humiliated or emotionally abused in other ways by your partner or ex-partner?: No   Housing Stability: Low Risk  (1/23/2024)    Housing Stability     Do you have housing? :  "Yes     Are you worried about losing your housing?: No       ==================================================================================================================================    PHYSICAL EXAM    VITAL SIGNS: BP 94/64   Pulse 74   Temp 98.1  F (36.7  C) (Tympanic)   Resp 20   Ht 1.651 m (5' 5\")   Wt 74.8 kg (165 lb)   LMP 09/29/2024   SpO2 100%   BMI 27.46 kg/m      No data found.      Physical Exam  Vitals and nursing note reviewed.   Constitutional:       Appearance: Normal appearance. She is not ill-appearing or diaphoretic.   HENT:      Head: Normocephalic.      Nose: Nose normal.      Mouth/Throat:      Mouth: Mucous membranes are moist.      Pharynx: Oropharynx is clear.   Eyes:      Conjunctiva/sclera: Conjunctivae normal.   Cardiovascular:      Rate and Rhythm: Normal rate and regular rhythm.      Pulses: Normal pulses.      Heart sounds: Normal heart sounds.   Pulmonary:      Effort: Pulmonary effort is normal.      Breath sounds: Normal breath sounds.   Abdominal:      General: Abdomen is flat. Bowel sounds are normal.      Palpations: Abdomen is soft.      Tenderness: There is no abdominal tenderness. There is no guarding or rebound.   Musculoskeletal:         General: Normal range of motion.      Cervical back: Normal range of motion and neck supple.   Skin:     General: Skin is warm and dry.   Neurological:      General: No focal deficit present.      Mental Status: She is alert.   Psychiatric:         Mood and Affect: Mood normal.            ==================================================================================================================================    LABS & RADIOLOGY:  All pertinent labs reviewed and interpreted. Reviewed all pertinent imaging. Please see official radiology report.  Results for orders placed or performed during the hospital encounter of 01/23/25   Influenza A/B, RSV and SARS-CoV2 PCR (COVID-19) Nose    Specimen: Nose; Swab   Result Value " "Ref Range    Influenza A PCR Negative Negative    Influenza B PCR Negative Negative    RSV PCR Negative Negative    SARS CoV2 PCR Negative Negative     No orders to display           ==================================================================================================================================    ED COURSE, MEDICAL DECISION MAKING, FINAL IMPRESSION AND PLAN:     Assessment / Plan:  1. Nausea and vomiting    2. Constipation    3. Lightheadedness    4. Second trimester pregnancy        The patient was interviewed and examined.  HPI and physical exam as below.  Differential diagnosis and MDM Key Documentation Elements as below.  Vitals, triage note, and nursing notes were reviewed.  BP 94/64   Pulse 74   Temp 98.1  F (36.7  C) (Tympanic)   Resp 20   Ht 1.651 m (5' 5\")   Wt 74.8 kg (165 lb)   LMP 09/29/2024   SpO2 100%   BMI 27.46 kg/m        Patient is afebrile with otherwise normal vitals.  Patient is not febrile.  Patient was not ill-appearing or septic appearing.  No objective physical exam findings.  No abdominal tenderness to warrant emergent imaging.  COVID, RSV, and films were negative.  Patient given 2 L IV normal saline with patient feeling better.  Fetal heart tones here in the ER were 152 and patient not complaining of any vaginal discharge or bleeding.      Overall patient feeling better with IV fluids.  Patient can follow-up with OB/GYN.  Return to the ER for any new or worsening symptoms.  Patient does not want any medications for nausea or vomiting.  Discharged home in stable condition.    Pertinent Labs & Imaging studies reviewed. (See chart for details)  Results for orders placed or performed during the hospital encounter of 01/23/25   Influenza A/B, RSV and SARS-CoV2 PCR (COVID-19) Nose    Specimen: Nose; Swab   Result Value Ref Range    Influenza A PCR Negative Negative    Influenza B PCR Negative Negative    RSV PCR Negative Negative    SARS CoV2 PCR Negative Negative "     Lab Results   Component Value Date    ABORH A NEG 12/10/2024         Reassessments, Medications, Interventions, & Response to Treatments:  -as above    Medications given during today's ER visit:  Medications   sodium chloride 0.9% BOLUS 1,000 mL (0 mLs Intravenous Stopped 1/23/25 1629)   sodium chloride 0.9% BOLUS 1,000 mL (0 mLs Intravenous Stopped 1/23/25 1720)       Consultations:  None    Decision Rules, Medical Calculators, and Risk Stratification Tools:  None    MDM Key Documentation Elements for Patient's Evaluation:  Differential diagnosis to include high risk considerations: As above  Escalation to admission/observation considered: Admission/observation considered, but patient does not meet admission criteria  Discussions and management with other clinicians:    3a. Independent interpretation of testing performed by another health professional:  -No  3b. Discussion of management or test interpretation with another health professional: -No  Independent interpretation of tests:  Ordering and/or review of 1 test(s)  Discussion of test interpretations with radiology:  No  History obtained from source other than patient or assessment requiring an independent historian:  No  Review of non-ED/external records:  review of 1 records  Diagnostic tests considered but not ultimately performed/deferred:  -CBC  Prescription medications considered but not prescribed:  -Zofran  Chronic conditions affecting care:  -None  Care affected by social determinants of health:  -None    The patient's management involved:   - Laboratory studies  - Parenteral controlled substance      A shared decision making model was used. Time was taken to answer all questions.  Patient and/or associated parties understood and were agreeable to treatment plan.  Plan and all results were discussed. Warning signs and close return precautions to return to the ED given. Copy of results given. Discharged in stable condition. Discharged with  discharge instructions outlining plan for further care and follow up.      New prescriptions started at today's ER visit  Discharge Medication List as of 1/23/2025  5:21 PM          ==================================================================================================================================      I, Russell Martini PA-C, personally performed the services described in this documentation, and it is both accurate and complete.       Robi Martini PA-C  01/26/25 [id number]

## 2025-01-23 NOTE — ED TRIAGE NOTES
"ED Nursing Triage Note (General)   ________________________________    Mya Gant is a 24 year old Female that presents to triage via private vehicle with complaints of dehydration.  Patient states nausea, headache, and dizziness since Sunday.  Patient states known exposure to norovirus and states she had vomited for 10 hours straight on Sunday night. Patient is now complaining of constipation and states, \"I have a rock hard tummy because it is so hard to poop right now, I havent been able to eat anything\".  Patient denies fevers at home. Patient is 15wk gestation.  No pregnancy concerns reported.   Significant symptoms had onset 4 day(s) ago.  /85   Pulse 83   Temp 98.1  F (36.7  C) (Tympanic)   Resp 20   Ht 1.651 m (5' 5\")   Wt 74.8 kg (165 lb)   LMP 09/29/2024   SpO2 98%   BMI 27.46 kg/m    Vital signs:  Temp: 98.1  F (36.7  C) Temp src: Tympanic BP: 122/85 Pulse: 83   Resp: 20 SpO2: 98 %     Height: 165.1 cm (5' 5\") Weight: 74.8 kg (165 lb)  Estimated body mass index is 27.46 kg/m  as calculated from the following:    Height as of this encounter: 1.651 m (5' 5\").    Weight as of this encounter: 74.8 kg (165 lb).  PRE HOSPITAL PRIOR LIVING SITUATION Alone      Triage Assessment (Adult)       Row Name 01/23/25 1487          Triage Assessment    Airway WDL WDL        Respiratory WDL    Respiratory WDL WDL        Skin Circulation/Temperature WDL    Skin Circulation/Temperature WDL WDL        Cardiac WDL    Cardiac WDL WDL     Cardiac Rhythm NSR        Peripheral/Neurovascular WDL    Peripheral Neurovascular WDL WDL     Capillary Refill, General less than/equal to 3 secs        Cognitive/Neuro/Behavioral WDL    Cognitive/Neuro/Behavioral WDL WDL        [place] Coma Scale    Best Eye Response 4-->(E4) spontaneous     Best Motor Response 6-->(M6) obeys commands     Best Verbal Response 5-->(V5) oriented     [place] Coma Scale Score 15                     "

## 2025-01-23 NOTE — DISCHARGE INSTRUCTIONS
-Recommend close follow-up with your OB/GYN if symptoms persist.  Please return to the ER for any new or worsening symptoms.

## 2025-01-28 LAB — BACTERIA BLD CULT: NO GROWTH

## 2025-02-18 ENCOUNTER — HOSPITAL ENCOUNTER (OUTPATIENT)
Dept: ULTRASOUND IMAGING | Facility: OTHER | Age: 25
Discharge: HOME | End: 2025-02-18
Attending: OBSTETRICS & GYNECOLOGY
Payer: COMMERCIAL

## 2025-02-18 ENCOUNTER — PRENATAL OFFICE VISIT (OUTPATIENT)
Dept: OBGYN | Facility: OTHER | Age: 25
End: 2025-02-18
Attending: OBSTETRICS & GYNECOLOGY
Payer: COMMERCIAL

## 2025-02-18 VITALS
SYSTOLIC BLOOD PRESSURE: 110 MMHG | DIASTOLIC BLOOD PRESSURE: 72 MMHG | BODY MASS INDEX: 28.62 KG/M2 | WEIGHT: 172 LBS | HEART RATE: 72 BPM

## 2025-02-18 DIAGNOSIS — Z34.82 ENCOUNTER FOR SUPERVISION OF OTHER NORMAL PREGNANCY IN SECOND TRIMESTER: ICD-10-CM

## 2025-02-18 DIAGNOSIS — Z34.82 ENCOUNTER FOR SUPERVISION OF OTHER NORMAL PREGNANCY IN SECOND TRIMESTER: Primary | ICD-10-CM

## 2025-02-18 PROCEDURE — 76805 OB US >/= 14 WKS SNGL FETUS: CPT

## 2025-02-18 ASSESSMENT — PAIN SCALES - GENERAL: PAINLEVEL_OUTOF10: NO PAIN (0)

## 2025-02-18 NOTE — NURSING NOTE
Chief Complaint   Patient presents with    Prenatal Care     19w4d       Medication Reconciliation: complete  Pt presents to clinic today for prenatal care recently had norovirus and was very sick for 7 days.   Shivani Luna LPN........................2/18/2025  9:34 AM

## 2025-02-18 NOTE — PROGRESS NOTES
Return OB Visit    S: Mya is feeling well. No cramping or VB. Only concern is itchy skin on her lower abdomen and scar from where she had a mole removed in the past year- can't remember if it had cancer or pre-cancer    O: /72 (BP Location: Right arm, Patient Position: Sitting, Cuff Size: Adult Large)   Pulse 72   Wt 78 kg (172 lb)   LMP 2024   BMI 28.62 kg/m    Gen: Well-appearing, NAD  Skin: lightly hyperpigmented 10mm raised scar/lesion in RLQ  See OB Flowsheet    A/P:  Mya Gant is a 24 year old  at 19w4d by 7w3d US, here for return OB visit.  Hx of polyhydramnios: plan US at 32 weeks    ?skin cancer  - encouraged patient to get her pathology report and contact her dermatologist for skin check     PNC: Rh negative- considering no Rhogam due to concerns it could be contaminated with COVID vaccine, Rubella immune  Genetics: negative carrier screen . Aneuploidy screen normal this pregnancy  Imaging: dating US at 7w3d, anatomy survey pending from today  Immunizations: declines flu  RTC 4 weeks    Padma French MD FACOG  OB/GYN  2025 9:46 AM

## 2025-03-03 ENCOUNTER — MYC MEDICAL ADVICE (OUTPATIENT)
Dept: OBGYN | Facility: OTHER | Age: 25
End: 2025-03-03
Payer: COMMERCIAL

## 2025-03-19 ENCOUNTER — PRENATAL OFFICE VISIT (OUTPATIENT)
Dept: OBGYN | Facility: OTHER | Age: 25
End: 2025-03-19
Attending: OBSTETRICS & GYNECOLOGY
Payer: COMMERCIAL

## 2025-03-19 VITALS
DIASTOLIC BLOOD PRESSURE: 72 MMHG | WEIGHT: 182 LBS | HEART RATE: 87 BPM | SYSTOLIC BLOOD PRESSURE: 118 MMHG | BODY MASS INDEX: 30.29 KG/M2

## 2025-03-19 DIAGNOSIS — Z34.82 ENCOUNTER FOR SUPERVISION OF OTHER NORMAL PREGNANCY IN SECOND TRIMESTER: Primary | ICD-10-CM

## 2025-03-19 ASSESSMENT — PAIN SCALES - GENERAL: PAINLEVEL_OUTOF10: NO PAIN (0)

## 2025-03-19 NOTE — PROGRESS NOTES
Return OB Visit    S: Mya is feeling well. No cramping, VB or LOF. +FM. Was feeling more lightheaded a few weeks ago and was having low BPs, notes it has been better recently.  She was wondering about getting tested for the MTHFR gene- has been reading about it and wants to know if she is a carrier    O: /72   Pulse 87   Wt 82.6 kg (182 lb)   LMP 2024   BMI 30.29 kg/m    Gen: Well-appearing, NAD  See OB Flowsheet    A/P:  Mya Gant is a 24 year old  at 23w5d by 7w3d US, here for return OB visit.  Hx of polyhydramnios: plan US at 32 weeks     ?history of skin cancer  - encouraged patient to get her pathology report and contact her dermatologist for skin check    Genetic testing: explained that the MTHFR gene is very common and many of the previously thought associations have been shown to not be significant. Discussed that insurance is unlikely to pay for testing in the absence of symptoms. She voiced understanding of this information.      PNC: Rh negative- is now agreeable to receiving Rhogam, Rubella immune  Genetics: negative carrier screen . Aneuploidy screen normal this pregnancy  Imaging: dating US at 7w3d, anatomy survey normal  Immunizations: declines flu  RTC 4 weeks- GCT, CBC, syphilis, Rhogam next visit, offer Tdap.    Padma French MD FACOG  OB/GYN  3/19/2025 9:01 AM

## 2025-03-19 NOTE — NURSING NOTE
"Chief Complaint   Patient presents with    Prenatal Care     23 w 5d     Pt presents to clinic today for prenatal care 23w 5d. Pt is doing well with no cocerns .   Initial /72   Pulse 87   Wt 82.6 kg (182 lb)   LMP 09/29/2024   BMI 30.29 kg/m   Estimated body mass index is 30.29 kg/m  as calculated from the following:    Height as of 1/23/25: 1.651 m (5' 5\").    Weight as of this encounter: 82.6 kg (182 lb).  Medication Reconciliation: complete        Mariana Madrid, AWA    "

## 2025-04-15 ENCOUNTER — PRENATAL OFFICE VISIT (OUTPATIENT)
Dept: OBGYN | Facility: OTHER | Age: 25
End: 2025-04-15
Attending: OBSTETRICS & GYNECOLOGY
Payer: COMMERCIAL

## 2025-04-15 VITALS
BODY MASS INDEX: 30.95 KG/M2 | SYSTOLIC BLOOD PRESSURE: 116 MMHG | HEART RATE: 80 BPM | DIASTOLIC BLOOD PRESSURE: 60 MMHG | WEIGHT: 186 LBS

## 2025-04-15 DIAGNOSIS — Z87.59 HISTORY OF POLYHYDRAMNIOS: ICD-10-CM

## 2025-04-15 DIAGNOSIS — L98.9 SKIN LESION: ICD-10-CM

## 2025-04-15 DIAGNOSIS — Z34.83 ENCOUNTER FOR SUPERVISION OF OTHER NORMAL PREGNANCY IN THIRD TRIMESTER: Primary | ICD-10-CM

## 2025-04-15 LAB
ERYTHROCYTE [DISTWIDTH] IN BLOOD BY AUTOMATED COUNT: 13.2 % (ref 10–15)
GLUCOSE 1H P 50 G GLC PO SERPL-MCNC: 83 MG/DL (ref 70–129)
HCT VFR BLD AUTO: 36.8 % (ref 35–47)
HGB BLD-MCNC: 12.3 G/DL (ref 11.7–15.7)
MCH RBC QN AUTO: 28.7 PG (ref 26.5–33)
MCHC RBC AUTO-ENTMCNC: 33.4 G/DL (ref 31.5–36.5)
MCV RBC AUTO: 86 FL (ref 78–100)
PLATELET # BLD AUTO: 229 10E3/UL (ref 150–450)
RBC # BLD AUTO: 4.28 10E6/UL (ref 3.8–5.2)
T PALLIDUM AB SER QL: NONREACTIVE
WBC # BLD AUTO: 8 10E3/UL (ref 4–11)

## 2025-04-15 PROCEDURE — 36415 COLL VENOUS BLD VENIPUNCTURE: CPT | Mod: ZL | Performed by: OBSTETRICS & GYNECOLOGY

## 2025-04-15 PROCEDURE — 85014 HEMATOCRIT: CPT | Mod: ZL | Performed by: OBSTETRICS & GYNECOLOGY

## 2025-04-15 PROCEDURE — 82950 GLUCOSE TEST: CPT | Mod: ZL | Performed by: OBSTETRICS & GYNECOLOGY

## 2025-04-15 PROCEDURE — 86780 TREPONEMA PALLIDUM: CPT | Mod: ZL | Performed by: OBSTETRICS & GYNECOLOGY

## 2025-04-15 ASSESSMENT — PAIN SCALES - GENERAL: PAINLEVEL_OUTOF10: NO PAIN (0)

## 2025-04-15 NOTE — NURSING NOTE
Chief Complaint   Patient presents with    Prenatal Care     27w4d       Medication Reconciliation: complete    Pt presents to clinic today for prenatal care. Pt denies any bleeding, or leakage of fluid at this time. States baby is moving good. Patient denies contractions.      Shivani Luna LPN........................4/15/2025  8:17 AM

## 2025-04-15 NOTE — PROGRESS NOTES
Return OB Visit    S: Mya is feeling well. No ctx, VB or LOF. +FM.     O: /60 (BP Location: Right arm, Patient Position: Sitting, Cuff Size: Adult Regular)   Pulse 80   Wt 84.4 kg (186 lb)   LMP 2024   BMI 30.95 kg/m    Gen: Well-appearing, NAD  See OB Flowsheet    A/P:  Mya Gant is a 24 year old  at 27w4d by 7w3d US, here for return OB visit.  Hx of polyhydramnios: plan US at 32 weeks     Skin lesion  - reports her path showed atypical cells but not cancer.  - referral to dermatology for reassessment and patient desires for removal postpartum     Genetic testing: explained that the MTHFR gene is very common and many of the previously thought associations have been shown to not be significant. Discussed that insurance is unlikely to pay for testing in the absence of symptoms. She voiced understanding of this information.      PNC: Rh negative- is now agreeable to receiving Rhogam, Rubella immune  Genetics: negative carrier screen . Aneuploidy screen normal this pregnancy  Imaging: dating US at 7w3d, anatomy survey normal  Immunizations: declines flu and Tdap  RTC 2 weeks- Wants to wait until next visit for Rhogam.    Padma French MD FACOG  OB/GYN  4/15/2025 8:15 AM

## 2025-04-29 ENCOUNTER — PRENATAL OFFICE VISIT (OUTPATIENT)
Dept: OBGYN | Facility: OTHER | Age: 25
End: 2025-04-29
Attending: OBSTETRICS & GYNECOLOGY
Payer: COMMERCIAL

## 2025-04-29 VITALS
DIASTOLIC BLOOD PRESSURE: 60 MMHG | SYSTOLIC BLOOD PRESSURE: 122 MMHG | BODY MASS INDEX: 31.45 KG/M2 | WEIGHT: 189 LBS | HEART RATE: 84 BPM

## 2025-04-29 DIAGNOSIS — Z34.83 ENCOUNTER FOR SUPERVISION OF OTHER NORMAL PREGNANCY IN THIRD TRIMESTER: Primary | ICD-10-CM

## 2025-04-29 LAB
ALBUMIN UR-MCNC: NEGATIVE MG/DL
APPEARANCE UR: CLEAR
BACTERIA #/AREA URNS HPF: ABNORMAL /HPF
BILIRUB UR QL STRIP: NEGATIVE
COLOR UR AUTO: YELLOW
GLUCOSE UR STRIP-MCNC: NEGATIVE MG/DL
HGB UR QL STRIP: NEGATIVE
KETONES UR STRIP-MCNC: NEGATIVE MG/DL
LEUKOCYTE ESTERASE UR QL STRIP: NEGATIVE
NITRATE UR QL: NEGATIVE
PH UR STRIP: 7 [PH] (ref 5–9)
RBC URINE: 1 /HPF
SP GR UR STRIP: 1 (ref 1–1.03)
UROBILINOGEN UR STRIP-MCNC: NORMAL MG/DL
WBC URINE: 2 /HPF

## 2025-04-29 PROCEDURE — 81001 URINALYSIS AUTO W/SCOPE: CPT | Mod: ZL | Performed by: OBSTETRICS & GYNECOLOGY

## 2025-04-29 PROCEDURE — 250N000011 HC RX IP 250 OP 636: Mod: JZ | Performed by: OBSTETRICS & GYNECOLOGY

## 2025-04-29 PROCEDURE — 96372 THER/PROPH/DIAG INJ SC/IM: CPT | Performed by: OBSTETRICS & GYNECOLOGY

## 2025-04-29 RX ADMIN — HUMAN RHO(D) IMMUNE GLOBULIN 300 MCG: 1500 SOLUTION INTRAMUSCULAR; INTRAVENOUS at 08:48

## 2025-04-29 ASSESSMENT — PAIN SCALES - GENERAL: PAINLEVEL_OUTOF10: NO PAIN (0)

## 2025-04-29 NOTE — PROGRESS NOTES
Return OB Visit    S: Mya is feeling well. Has been having increased urinary urgency the last few days but no dysuria. No ctx, VB or LOF. +FM    O: /60 (BP Location: Right arm, Patient Position: Sitting, Cuff Size: Adult Regular)   Pulse 84   Wt 85.7 kg (189 lb)   LMP 2024   BMI 31.45 kg/m    Gen: Well-appearing, NAD  See OB Flowsheet    A/P:  Mya Gant is a 24 year old  at 29w4d by 7w3d US, here for return OB visit.  Hx of polyhydramnios: plan US at 32 weeks     Skin lesion  - reports her path showed atypical cells but not cancer.  - referral to dermatology for reassessment and patient desires for removal postpartum     Genetic testing: explained that the MTHFR gene is very common and many of the previously thought associations have been shown to not be significant. Discussed that insurance is unlikely to pay for testing in the absence of symptoms. She voiced understanding of this information.      PNC: Rh negative- Rhogam 2025, Rubella immune, GCT 83  Genetics: negative carrier screen . Aneuploidy screen normal this pregnancy  Imaging: dating US at 7w3d, anatomy survey normal  Immunizations: declines flu and Tdap  RTC 2 weeks    Padma French MD FACOG  OB/GYN  2025 8:08 AM

## 2025-04-29 NOTE — NURSING NOTE
Chief Complaint   Patient presents with    Prenatal Care     29w4d       Medication Reconciliation: complete  Pt presents to clinic today for prenatal care. Pt denies any bleeding, or leakage of fluid at this time. States baby is moving good. Patient denies contractions.      Shivani Luna LPN........................4/29/2025  8:13 AM

## 2025-05-12 ENCOUNTER — HOSPITAL ENCOUNTER (OUTPATIENT)
Dept: ULTRASOUND IMAGING | Facility: OTHER | Age: 25
Discharge: HOME | End: 2025-05-12
Attending: OBSTETRICS & GYNECOLOGY | Admitting: RADIOLOGY
Payer: COMMERCIAL

## 2025-05-12 DIAGNOSIS — Z87.59 HISTORY OF POLYHYDRAMNIOS: ICD-10-CM

## 2025-05-12 PROCEDURE — 76816 OB US FOLLOW-UP PER FETUS: CPT | Mod: 26 | Performed by: RADIOLOGY

## 2025-05-12 PROCEDURE — 76816 OB US FOLLOW-UP PER FETUS: CPT

## 2025-05-13 ENCOUNTER — MYC MEDICAL ADVICE (OUTPATIENT)
Dept: OBGYN | Facility: OTHER | Age: 25
End: 2025-05-13
Payer: COMMERCIAL

## 2025-05-27 ENCOUNTER — PRENATAL OFFICE VISIT (OUTPATIENT)
Dept: OBGYN | Facility: OTHER | Age: 25
End: 2025-05-27
Attending: OBSTETRICS & GYNECOLOGY
Payer: COMMERCIAL

## 2025-05-27 VITALS
HEART RATE: 88 BPM | WEIGHT: 198 LBS | SYSTOLIC BLOOD PRESSURE: 118 MMHG | BODY MASS INDEX: 32.95 KG/M2 | DIASTOLIC BLOOD PRESSURE: 72 MMHG

## 2025-05-27 DIAGNOSIS — Z34.83 ENCOUNTER FOR SUPERVISION OF OTHER NORMAL PREGNANCY IN THIRD TRIMESTER: Primary | ICD-10-CM

## 2025-05-27 ASSESSMENT — PAIN SCALES - GENERAL: PAINLEVEL_OUTOF10: NO PAIN (0)

## 2025-05-27 NOTE — NURSING NOTE
Chief Complaint   Patient presents with    Prenatal Care     33w4d       Medication Reconciliation: complete    Pt presents to clinic today for prenatal care. Pt denies any bleeding, or leakage of fluid at this time. States baby is moving good. Patient denies any regular contractions.      Shivani Luna LPN........................5/27/2025  8:21 AM

## 2025-05-27 NOTE — PROGRESS NOTES
Return OB Visit    S: Mya is feeling well overall. No ctx, VB or LOF. +FM.    O: /72 (BP Location: Right arm, Patient Position: Sitting, Cuff Size: Adult Large)   Pulse 88   Wt 89.8 kg (198 lb)   LMP 2024   BMI 32.95 kg/m    Gen: Well-appearing, NAD  See OB Flowsheet    A/P:  Mya Gant is a 24 year old  at 33w4d by 7w3d US, here for return OB visit.  Hx of polyhydramnios:   - Growth US 25 [id number] (25%ile), SHELLY 15.7     Skin lesion  - reports her path showed atypical cells but not cancer.  - s/p skin check by dermatology in pregnancy     Genetic testing: explained that the MTHFR gene is very common and many of the previously thought associations have been shown to not be significant. Discussed that insurance is unlikely to pay for testing in the absence of symptoms. She voiced understanding of this information.      PNC: Rh negative- Rhogam 2025, Rubella immune, GCT 83  Genetics: negative carrier screen . Aneuploidy screen normal this pregnancy  Imaging: dating US at 7w3d, anatomy survey normal  Immunizations: declines flu and Tdap  RTC 2 weeks    Padma French MD FACOG  OB/GYN  2025 8:37 AM

## 2025-06-10 ENCOUNTER — PRENATAL OFFICE VISIT (OUTPATIENT)
Dept: OBGYN | Facility: OTHER | Age: 25
End: 2025-06-10
Attending: OBSTETRICS & GYNECOLOGY
Payer: COMMERCIAL

## 2025-06-10 VITALS
WEIGHT: 201 LBS | DIASTOLIC BLOOD PRESSURE: 62 MMHG | BODY MASS INDEX: 33.45 KG/M2 | SYSTOLIC BLOOD PRESSURE: 102 MMHG | HEART RATE: 80 BPM

## 2025-06-10 DIAGNOSIS — Z34.83 ENCOUNTER FOR SUPERVISION OF OTHER NORMAL PREGNANCY IN THIRD TRIMESTER: Primary | ICD-10-CM

## 2025-06-10 ASSESSMENT — PAIN SCALES - GENERAL: PAINLEVEL_OUTOF10: NO PAIN (0)

## 2025-06-10 NOTE — NURSING NOTE
Chief Complaint   Patient presents with    Prenatal Care     35w4d     Pt presents to clinic today for prenatal care. Pt denies any bleeding, or leakage of fluid at this time. States baby is moving good. Patient denies contractions.      Medication Reconciliation: complete    Shivani Luna LPN........................6/10/2025  8:17 AM

## 2025-06-10 NOTE — PROGRESS NOTES
Return OB Visit    S: Mya is feeling well. More BH ctx, especially at night. No VB or LOF. +FM    O: /62 (BP Location: Right arm, Patient Position: Sitting, Cuff Size: Adult Large)   Pulse 80   Wt 91.2 kg (201 lb)   LMP 2024   BMI 33.45 kg/m    Gen: Well-appearing, NAD  See OB Flowsheet    A/P:  Mya Gant is a 24 year old  at 35w4d by 7w3d US, here for return OB visit.  Hx of polyhydramnios:   - Growth US 25 [id number] (25%ile), SHELLY 15.7     Skin lesion  - reports her path showed atypical cells but not cancer.  - s/p skin check by dermatology in pregnancy     Genetic testing: explained that the MTHFR gene is very common and many of the previously thought associations have been shown to not be significant. Discussed that insurance is unlikely to pay for testing in the absence of symptoms. She voiced understanding of this information.      PNC: Rh negative- Rhogam 2025, Rubella immune, GCT 83  Genetics: negative carrier screen . Aneuploidy screen normal this pregnancy  Imaging: dating US at 7w3d, anatomy survey normal  Immunizations: declines flu and Tdap  RTC weekly until delivery- GBS next visit    Padma French MD FACOG  OB/GYN  6/10/2025 8:28 AM

## 2025-06-13 ENCOUNTER — RESULTS FOLLOW-UP (OUTPATIENT)
Dept: OBGYN | Facility: OTHER | Age: 25
End: 2025-06-13

## 2025-06-17 ENCOUNTER — PRENATAL OFFICE VISIT (OUTPATIENT)
Dept: OBGYN | Facility: OTHER | Age: 25
End: 2025-06-17
Attending: OBSTETRICS & GYNECOLOGY
Payer: COMMERCIAL

## 2025-06-17 VITALS
SYSTOLIC BLOOD PRESSURE: 100 MMHG | HEART RATE: 80 BPM | WEIGHT: 203 LBS | DIASTOLIC BLOOD PRESSURE: 56 MMHG | BODY MASS INDEX: 33.78 KG/M2

## 2025-06-17 DIAGNOSIS — Z34.83 ENCOUNTER FOR SUPERVISION OF OTHER NORMAL PREGNANCY IN THIRD TRIMESTER: Primary | ICD-10-CM

## 2025-06-17 ASSESSMENT — PAIN SCALES - GENERAL: PAINLEVEL_OUTOF10: NO PAIN (0)

## 2025-06-17 NOTE — PROGRESS NOTES
Return OB Visit    S: Mya is more uncomfortable, ready to be done. Has been having more BH ctx at night. Increased discharge but not irritative. No VB or LOF. +FM    O: /56 (BP Location: Right arm, Patient Position: Sitting, Cuff Size: Adult Regular)   Pulse 80   Wt 92.1 kg (203 lb)   LMP 2024   BMI 33.78 kg/m    Gen: Well-appearing, NAD  See OB Flowsheet    A/P:  Mya Gant is a 24 year old  at 36w4d by 7w3d US, here for routien OB visit  Contractions: had closed cervix with negative MVP and UA on 25     Hx of polyhydramnios:   - Growth US 25 [id number] (25%ile), SHELLY 15.7     Skin lesion  - reports her path showed atypical cells but not cancer.  - s/p skin check by dermatology in pregnancy     Genetic testing: explained that the MTHFR gene is very common and many of the previously thought associations have been shown to not be significant. Discussed that insurance is unlikely to pay for testing in the absence of symptoms. She voiced understanding of this information.      PNC: Rh negative- Rhogam 2025, Rubella immune, GCT 83, GBS negative  Genetics: negative carrier screen . Aneuploidy screen normal this pregnancy  Imaging: dating US at 7w3d, anatomy survey normal  Immunizations: declines flu and Tdap  RTC weekly until delivery       Padma French MD FACOG  OB/GYN  2025 8:48 AM

## 2025-06-17 NOTE — NURSING NOTE
Chief Complaint   Patient presents with    Prenatal Care     36w4d       Medication Reconciliation: complete    Pt presents to clinic today for prenatal care. Pt denies any bleeding, or leakage of fluid at this time. States baby is moving good. Patient denies any regular contractions.      Shivani Luna LPN........................6/17/2025  8:15 AM

## 2025-06-24 ENCOUNTER — PRENATAL OFFICE VISIT (OUTPATIENT)
Dept: OBGYN | Facility: OTHER | Age: 25
End: 2025-06-24
Attending: OBSTETRICS & GYNECOLOGY
Payer: COMMERCIAL

## 2025-06-24 ENCOUNTER — RESULTS FOLLOW-UP (OUTPATIENT)
Dept: OBGYN | Facility: OTHER | Age: 25
End: 2025-06-24

## 2025-06-24 VITALS
WEIGHT: 208.9 LBS | DIASTOLIC BLOOD PRESSURE: 62 MMHG | RESPIRATION RATE: 16 BRPM | HEART RATE: 85 BPM | OXYGEN SATURATION: 97 % | SYSTOLIC BLOOD PRESSURE: 124 MMHG | BODY MASS INDEX: 34.76 KG/M2

## 2025-06-24 DIAGNOSIS — Z34.83 ENCOUNTER FOR SUPERVISION OF OTHER NORMAL PREGNANCY IN THIRD TRIMESTER: Primary | ICD-10-CM

## 2025-06-24 LAB — RUPTURE OF FETAL MEMBRANES BY ROM PLUS: NEGATIVE

## 2025-06-24 PROCEDURE — 99207 PR OB VISIT-NO CHARGE - GICH ONLY: CPT | Performed by: OBSTETRICS & GYNECOLOGY

## 2025-06-24 PROCEDURE — 1126F AMNT PAIN NOTED NONE PRSNT: CPT | Performed by: OBSTETRICS & GYNECOLOGY

## 2025-06-24 PROCEDURE — 0502F SUBSEQUENT PRENATAL CARE: CPT | Performed by: OBSTETRICS & GYNECOLOGY

## 2025-06-24 PROCEDURE — 84112 EVAL AMNIOTIC FLUID PROTEIN: CPT | Mod: ZL | Performed by: OBSTETRICS & GYNECOLOGY

## 2025-06-24 PROCEDURE — 3074F SYST BP LT 130 MM HG: CPT | Performed by: OBSTETRICS & GYNECOLOGY

## 2025-06-24 PROCEDURE — 3078F DIAST BP <80 MM HG: CPT | Performed by: OBSTETRICS & GYNECOLOGY

## 2025-06-24 ASSESSMENT — PAIN SCALES - GENERAL: PAINLEVEL_OUTOF10: NO PAIN (0)

## 2025-06-24 NOTE — PROGRESS NOTES
Return OB Visit    S: Mya is pretty uncomfortable. More BH ctx, no VB. Increased wetness around her vagina, unsure if sweat or leaking fluid. +FM. Has been feeling like she can't take a deep breath. No chest pain.    O: /62   Pulse 85   Resp 16   Wt 94.8 kg (208 lb 14.4 oz)   LMP 2024   SpO2 97%   Breastfeeding No   BMI 34.76 kg/m    Gen: Well-appearing, NAD  CV: RRR, no m/r/g  Ext: 1+ edema  Pelvic: normal external genitalia. Normal vagina. No pool of fluid  See OB Flowsheet    A/P:  Mya Gant is a 24 year old  at 37w4d by 7w3d US, here for routien OB visit  Contractions: had closed cervix with negative MVP and UA on 25     Hx of polyhydramnios:   - Growth US 25 [id number] (25%ile), SHELLY 15.7     Skin lesion  - reports her path showed atypical cells but not cancer.  - s/p skin check by dermatology in pregnancy     Genetic testing: explained that the MTHFR gene is very common and many of the previously thought associations have been shown to not be significant. Discussed that insurance is unlikely to pay for testing in the absence of symptoms. She voiced understanding of this information.      PNC: Rh negative- Rhogam 2025, Rubella immune, GCT 83, GBS negative  Genetics: negative carrier screen . Aneuploidy screen normal this pregnancy  Imaging: dating US at 7w3d, anatomy survey normal  Immunizations: declines flu and Tdap  RTC weekly until delivery    Padma French MD FACOG  OB/GYN  2025 9:18 AM

## 2025-06-24 NOTE — NURSING NOTE
"Chief Complaint   Patient presents with    Prenatal Care     37 weeks 4 days        Initial /62   Pulse 85   Resp 16   Wt 94.8 kg (208 lb 14.4 oz)   LMP 09/29/2024   SpO2 97%   Breastfeeding No   BMI 34.76 kg/m   Estimated body mass index is 34.76 kg/m  as calculated from the following:    Height as of 1/23/25: 1.651 m (5' 5\").    Weight as of this encounter: 94.8 kg (208 lb 14.4 oz).  Medication Review: complete    The next two questions are to help us understand your food security.  If you are feeling you need any assistance in this area, we have resources available to support you today.          1/23/2024   SDOH- Food Insecurity   Within the past 12 months, did you worry that your food would run out before you got money to buy more? N   Within the past 12 months, did the food you bought just not last and you didn t have money to get more? N        Data saved with a previous flowsheet row definition         Health Care Directive:  Patient does not have a Health Care Directive: Discussed advance care planning with patient; however, patient declined at this time.    Alfreda Fraga, [facility]      "

## 2025-07-01 ENCOUNTER — PRENATAL OFFICE VISIT (OUTPATIENT)
Dept: OBGYN | Facility: OTHER | Age: 25
End: 2025-07-01
Attending: OBSTETRICS & GYNECOLOGY
Payer: COMMERCIAL

## 2025-07-01 VITALS
SYSTOLIC BLOOD PRESSURE: 110 MMHG | WEIGHT: 210.7 LBS | HEART RATE: 74 BPM | BODY MASS INDEX: 35.06 KG/M2 | DIASTOLIC BLOOD PRESSURE: 68 MMHG

## 2025-07-01 DIAGNOSIS — Z34.83 ENCOUNTER FOR SUPERVISION OF OTHER NORMAL PREGNANCY IN THIRD TRIMESTER: Primary | ICD-10-CM

## 2025-07-01 ASSESSMENT — PAIN SCALES - GENERAL: PAINLEVEL_OUTOF10: SEVERE PAIN (10)

## 2025-07-01 NOTE — NURSING NOTE
"Chief Complaint   Patient presents with    Prenatal Care     38w4d     Pt presents to clinic today for prenatal care 38w4d. Pt denies any bleeding, or leakage of fluid at this time. States baby is moving good. Patient denies contractions.      Initial /68 (BP Location: Right arm, Patient Position: Sitting, Cuff Size: Adult Regular)   Pulse 74   Wt 95.6 kg (210 lb 11.2 oz)   LMP 09/29/2024   BMI 35.06 kg/m   Estimated body mass index is 35.06 kg/m  as calculated from the following:    Height as of 1/23/25: 1.651 m (5' 5\").    Weight as of this encounter: 95.6 kg (210 lb 11.2 oz).  Medication Review: complete    The next two questions are to help us understand your food security.  If you are feeling you need any assistance in this area, we have resources available to support you today.          1/23/2024   SDOH- Food Insecurity   Within the past 12 months, did you worry that your food would run out before you got money to buy more? N   Within the past 12 months, did the food you bought just not last and you didn t have money to get more? N        Data saved with a previous flowsheet row definition         Health Care Directive:  Patient does not have a Health Care Directive: Discussed advance care planning with patient; however, patient declined at this time.    Virginia Bland LPN      "

## 2025-07-01 NOTE — PROGRESS NOTES
Return OB Visit    S: Mya is uncomfortable but well. More lower back pain, BH ctx. No VB or LOF. +FM    O: /68 (BP Location: Right arm, Patient Position: Sitting, Cuff Size: Adult Regular)   Pulse 74   Wt 95.6 kg (210 lb 11.2 oz)   LMP 2024   BMI 35.06 kg/m    Gen: Well-appearing, NAD  See OB Flowsheet    A/P:  Mya Gant is a 24 year old  at 38w4d by 7w3d US, here for routien OB visit  Contractions: had closed cervix with negative MVP and UA on 25     Hx of polyhydramnios:   - Growth US 25 [id number] (25%ile), SHELLY 15.7     Skin lesion  - reports her path showed atypical cells but not cancer.  - s/p skin check by dermatology in pregnancy     Genetic testing: explained that the MTHFR gene is very common and many of the previously thought associations have been shown to not be significant. Discussed that insurance is unlikely to pay for testing in the absence of symptoms. She voiced understanding of this information.      PNC: Rh negative- Rhogam 2025, Rubella immune, GCT 83, GBS negative  Genetics: negative carrier screen . Aneuploidy screen normal this pregnancy  Imaging: dating US at 7w3d, anatomy survey normal  Immunizations: declines flu and Tdap  RTC weekly until delivery    Elective IOL scheduled for  if still undelivered    Padma French MD FACOG  OB/GYN  2025 1:08 PM

## 2025-07-08 ENCOUNTER — PRENATAL OFFICE VISIT (OUTPATIENT)
Dept: OBGYN | Facility: OTHER | Age: 25
End: 2025-07-08
Attending: OBSTETRICS & GYNECOLOGY
Payer: COMMERCIAL

## 2025-07-08 VITALS
DIASTOLIC BLOOD PRESSURE: 74 MMHG | RESPIRATION RATE: 18 BRPM | WEIGHT: 210.7 LBS | SYSTOLIC BLOOD PRESSURE: 110 MMHG | BODY MASS INDEX: 35.06 KG/M2 | HEART RATE: 88 BPM

## 2025-07-08 DIAGNOSIS — Z34.83 ENCOUNTER FOR SUPERVISION OF OTHER NORMAL PREGNANCY IN THIRD TRIMESTER: Primary | ICD-10-CM

## 2025-07-08 DIAGNOSIS — O47.9 BRAXTON HICK'S CONTRACTION: ICD-10-CM

## 2025-07-08 ASSESSMENT — PAIN SCALES - GENERAL: PAINLEVEL_OUTOF10: MODERATE PAIN (4)

## 2025-07-08 NOTE — NURSING NOTE
Chief Complaint   Patient presents with    Prenatal Care     Increased severe lower vaginal cramping last night. Cramping every 3-7 mins. Continues to have leaking and bloody discharge since last cervix check. Today cramping has been every 20 mins today. Active babe. Would like cervix check today.     Medication Reconciliation: complete    Alfreda Coates, LPN

## 2025-07-08 NOTE — PROGRESS NOTES
CHIEF COMPLAINT:  Routine OB visit at 39w4d    HPI: Mya Gant presents for a routine OB visit at 39w4d.  Patient has been feeling very uncomfortable.  Starting last evening at 10 PM until 5 AM this morning she has been having severe pelvic cramping coming in waves every 3 to 10 minutes.  Walking around did help improve her pelvic discomfort/cramping that was coming would every 7 to 10 minutes.  When she would lay down and rest the cramping seemed more frequent every 3 minutes to 5 minutes.  Patient cervical check last week she states she had 3 days of bloody show and has had increased mucus loss.  She is having intermittent sharp vaginal pain.  She started feeling nauseous yesterday.    Today she feels her contractions about every 20 minutes and they are fairly uncomfortable.  She would rate her contraction pain a 4-5 out of 10 currently, last evening they were 7-8 out of 10 for pain.    Patient notices normal fetal movement, denies contractions, vaginal bleeding or leaking of fluid.    O: /74   Pulse 88   Resp 18   Wt 95.6 kg (210 lb 11.2 oz)   LMP 2024   Breastfeeding No   BMI 35.06 kg/m    Body mass index is 35.06 kg/m .  See OB flow sheet  EXAM:   General: Alert, cooperative, clear coherent speech  Respiratory: Unlabored, no wheezing  Lower extremities: No lower extremity edema, normal perfusion    FH:39 cm  FHT:158 bpm  Cervical check: 2.5cm/80%/-2- per Dr. Gar    No results found for any visits on 25.    ASSESSMENT/PLAN:     Mya Gant is a 24 year old  at 39w4d by 7w3d US, here for routien OB visit  --Contractions: cervical dilation 2.5 cm today, same as last week.  Discussed plan for contacting labor and delivery if she is having more regular and painful contractions or if her water were to break.     --Hx of polyhydramnios:   - Growth US 25 [id number] (25%ile), SHELLY 15.7     --Skin lesion  - reports her path showed atypical cells but not cancer.  - s/p skin  check by dermatology in pregnancy     --Genetic testing: explained that the MTHFR gene is very common and many of the previously thought associations have been shown to not be significant. Discussed that insurance is unlikely to pay for testing in the absence of symptoms. She voiced understanding of this information.      PNC: Rh negative- Rhogam 4/29/2025, Rubella immune, GCT 83, GBS negative  Genetics: negative carrier screen 2022. Aneuploidy screen normal this pregnancy  Imaging: dating US at 7w3d, anatomy survey normal  Immunizations: declines flu and Tdap  RTC weekly until delivery     Delivery planning/postpartum planning : Desires to breast-feed.  Epidural for pain management.  Would like to avoid induction.    SUSI NgP., R.N., APRN CNP  7/8/2025

## 2025-07-09 ENCOUNTER — PRENATAL OFFICE VISIT (OUTPATIENT)
Dept: OBGYN | Facility: OTHER | Age: 25
End: 2025-07-09
Attending: NURSE PRACTITIONER
Payer: COMMERCIAL

## 2025-07-09 VITALS
RESPIRATION RATE: 18 BRPM | HEART RATE: 76 BPM | DIASTOLIC BLOOD PRESSURE: 78 MMHG | BODY MASS INDEX: 34.95 KG/M2 | WEIGHT: 210 LBS | SYSTOLIC BLOOD PRESSURE: 120 MMHG

## 2025-07-09 DIAGNOSIS — R35.0 URINARY FREQUENCY: Primary | ICD-10-CM

## 2025-07-09 LAB
ALBUMIN UR-MCNC: NEGATIVE MG/DL
APPEARANCE UR: CLEAR
BACTERIA #/AREA URNS HPF: ABNORMAL /HPF
BILIRUB UR QL STRIP: NEGATIVE
COLOR UR AUTO: ABNORMAL
GLUCOSE UR STRIP-MCNC: NEGATIVE MG/DL
HGB UR QL STRIP: NEGATIVE
KETONES UR STRIP-MCNC: NEGATIVE MG/DL
LEUKOCYTE ESTERASE UR QL STRIP: NEGATIVE
NITRATE UR QL: NEGATIVE
PH UR STRIP: 7.5 [PH] (ref 5–9)
RBC URINE: 0 /HPF
SP GR UR STRIP: 1 (ref 1–1.03)
SQUAMOUS EPITHELIAL: 2 /HPF
UROBILINOGEN UR STRIP-MCNC: NORMAL MG/DL
WBC URINE: <1 /HPF

## 2025-07-09 PROCEDURE — 81003 URINALYSIS AUTO W/O SCOPE: CPT | Mod: ZL | Performed by: NURSE PRACTITIONER

## 2025-07-09 ASSESSMENT — PAIN SCALES - GENERAL: PAINLEVEL_OUTOF10: MODERATE PAIN (4)

## 2025-07-09 NOTE — PROGRESS NOTES
CHIEF COMPLAINT:  Routine OB visit at 39w5d    HPI: Mya Gant presents for a routine OB visit at 39w5d.  Still having Currie Alegria but not as frequently as she was yesterday.  She is having more pressure in her tailbone across her low back.  She was noticing continuous loss of mucous plug.  Denies any vaginal bleeding or leaking of fluid.  Has some urinary frequency and urgency increased from previously and pregnancy.  Patient notices normal fetal movement, denies regular contractions, vaginal bleeding or leaking of fluid.    O: /78   Pulse 76   Resp 18   Wt 95.3 kg (210 lb)   LMP 2024   Breastfeeding No   BMI 34.95 kg/m    Body mass index is 34.95 kg/m .  See OB flow sheet  EXAM:   General: Alert, cooperative, clear coherent speech  Respiratory: Unlabored, no wheezing  Lower extremities: No lower extremity edema, normal perfusion    FHT:135 bpm  Cervical Check: 2.5cm/80% per Dr. Joaquin    Results for orders placed or performed in visit on 25   UA with Microscopic reflex to Culture     Status: Abnormal    Specimen: Urine, Midstream   Result Value Ref Range    Color Urine Light Yellow Colorless, Straw, Light Yellow, Yellow    Appearance Urine Clear Clear    Glucose Urine Negative Negative mg/dL    Bilirubin Urine Negative Negative    Ketones Urine Negative Negative mg/dL    Specific Gravity Urine 1.004 1.000 - 1.030    Blood Urine Negative Negative    pH Urine 7.5 5.0 - 9.0    Protein Albumin Urine Negative Negative mg/dL    Urobilinogen Urine Normal Normal mg/dL    Nitrite Urine Negative Negative    Leukocyte Esterase Urine Negative Negative    Bacteria Urine Few (A) None Seen /HPF    RBC Urine 0 <=2 /HPF    WBC Urine <1 <=5 /HPF    Squamous Epithelials Urine 2 (H) <=1 /HPF    Narrative    Urine Culture not indicated       ASSESSMENT/PLAN:     Mya Gant is a 24 year old  at 39w5d by 7w3d US, here for routien OB visit  --Contractions: cervical dilation 2.5 cm today.   Discussed plan for contacting labor and delivery if she is having more regular and painful contractions or if her water were to break.     -- Increased urinary frequency and urgency, will collect urinalysis today to rule out infection.  This returned normal.     --Hx of polyhydramnios:   - Growth US 5/12/25 [id number] (25%ile), SHELLY 15.7     --Skin lesion  - reports her path showed atypical cells but not cancer.  - s/p skin check by dermatology in pregnancy     --Genetic testing: explained that the MTHFR gene is very common and many of the previously thought associations have been shown to not be significant. Discussed that insurance is unlikely to pay for testing in the absence of symptoms. She voiced understanding of this information.      PNC: Rh negative- Rhogam 4/29/2025, Rubella immune, GCT 83, GBS negative  Genetics: negative carrier screen 2022. Aneuploidy screen normal this pregnancy  Imaging: dating US at 7w3d, anatomy survey normal  Immunizations: declines flu and Tdap    RTC weekly until delivery     Delivery planning/postpartum planning : Desires to breast-feed.  Epidural for pain management.  Originally planned for induction with Dr. French on 7/16/2025 however now would like to avoid induction.  She does not want to go to 41 weeks pregnant.  Patient is aware that Dr. French is out of office and we discussed Dr. Joaquin and Dr. Gar being on call over the next week.  Patient understands when to present to hospital/emergency department with signs of labor    MARCELLUS Ng, R.N., APRN CNP  7/9/2025

## 2025-07-09 NOTE — NURSING NOTE
Chief Complaint   Patient presents with    Prenatal Care     Here today for Follow-up. Increasingly uncomfortable. Active babe. Increase in mucous since yesterday.     Medication Reconciliation: complete    Alfreda Coates LPN

## 2025-07-16 ENCOUNTER — ANESTHESIA (OUTPATIENT)
Dept: OBGYN | Facility: OTHER | Age: 25
End: 2025-07-16
Payer: COMMERCIAL

## 2025-07-16 ENCOUNTER — HOSPITAL ENCOUNTER (INPATIENT)
Facility: OTHER | Age: 25
End: 2025-07-16
Attending: OBSTETRICS & GYNECOLOGY | Admitting: OBSTETRICS & GYNECOLOGY
Payer: COMMERCIAL

## 2025-07-16 ENCOUNTER — ANESTHESIA EVENT (OUTPATIENT)
Dept: OBGYN | Facility: OTHER | Age: 25
End: 2025-07-16
Payer: COMMERCIAL

## 2025-07-16 LAB
ABO + RH BLD: ABNORMAL
BLD GP AB INVEST PLASRBC-IMP: NORMAL
BLD GP AB SCN SERPL QL: POSITIVE
ERYTHROCYTE [DISTWIDTH] IN BLOOD BY AUTOMATED COUNT: 12.9 % (ref 10–15)
HCT VFR BLD AUTO: 35.8 % (ref 35–47)
HGB BLD-MCNC: 11.9 G/DL (ref 11.7–15.7)
HOLD SPECIMEN: NORMAL
MCH RBC QN AUTO: 28.6 PG (ref 26.5–33)
MCHC RBC AUTO-ENTMCNC: 33.2 G/DL (ref 31.5–36.5)
MCV RBC AUTO: 86 FL (ref 78–100)
PLATELET # BLD AUTO: 241 10E3/UL (ref 150–450)
RBC # BLD AUTO: 4.16 10E6/UL (ref 3.8–5.2)
SPECIMEN EXP DATE BLD: ABNORMAL
SPECIMEN EXP DATE BLD: NORMAL
T PALLIDUM AB SER QL: NONREACTIVE
WBC # BLD AUTO: 10.7 10E3/UL (ref 4–11)

## 2025-07-16 PROCEDURE — 250N000009 HC RX 250: Performed by: OBSTETRICS & GYNECOLOGY

## 2025-07-16 PROCEDURE — 59400 OBSTETRICAL CARE: CPT | Performed by: OBSTETRICS & GYNECOLOGY

## 2025-07-16 PROCEDURE — 10907ZC DRAINAGE OF AMNIOTIC FLUID, THERAPEUTIC FROM PRODUCTS OF CONCEPTION, VIA NATURAL OR ARTIFICIAL OPENING: ICD-10-PCS | Performed by: OBSTETRICS & GYNECOLOGY

## 2025-07-16 PROCEDURE — 250N000011 HC RX IP 250 OP 636: Performed by: NURSE ANESTHETIST, CERTIFIED REGISTERED

## 2025-07-16 PROCEDURE — 258N000003 HC RX IP 258 OP 636: Performed by: OBSTETRICS & GYNECOLOGY

## 2025-07-16 PROCEDURE — 86870 RBC ANTIBODY IDENTIFICATION: CPT | Performed by: OBSTETRICS & GYNECOLOGY

## 2025-07-16 PROCEDURE — 120N000001 HC R&B MED SURG/OB

## 2025-07-16 PROCEDURE — 85041 AUTOMATED RBC COUNT: CPT | Performed by: OBSTETRICS & GYNECOLOGY

## 2025-07-16 PROCEDURE — 250N000013 HC RX MED GY IP 250 OP 250 PS 637: Performed by: OBSTETRICS & GYNECOLOGY

## 2025-07-16 PROCEDURE — 86900 BLOOD TYPING SEROLOGIC ABO: CPT | Performed by: OBSTETRICS & GYNECOLOGY

## 2025-07-16 PROCEDURE — 86780 TREPONEMA PALLIDUM: CPT | Performed by: OBSTETRICS & GYNECOLOGY

## 2025-07-16 PROCEDURE — 0KQM0ZZ REPAIR PERINEUM MUSCLE, OPEN APPROACH: ICD-10-PCS | Performed by: OBSTETRICS & GYNECOLOGY

## 2025-07-16 PROCEDURE — 722N000001 HC LABOR CARE VAGINAL DELIVERY SINGLE

## 2025-07-16 PROCEDURE — 3E033VJ INTRODUCTION OF OTHER HORMONE INTO PERIPHERAL VEIN, PERCUTANEOUS APPROACH: ICD-10-PCS | Performed by: OBSTETRICS & GYNECOLOGY

## 2025-07-16 PROCEDURE — 370N000003 HC ANESTHESIA WARD SERVICE

## 2025-07-16 PROCEDURE — 36415 COLL VENOUS BLD VENIPUNCTURE: CPT | Performed by: OBSTETRICS & GYNECOLOGY

## 2025-07-16 RX ORDER — METOCLOPRAMIDE HYDROCHLORIDE 5 MG/ML
10 INJECTION INTRAMUSCULAR; INTRAVENOUS EVERY 6 HOURS PRN
Status: DISCONTINUED | OUTPATIENT
Start: 2025-07-16 | End: 2025-07-16 | Stop reason: HOSPADM

## 2025-07-16 RX ORDER — KETOROLAC TROMETHAMINE 15 MG/ML
15 INJECTION, SOLUTION INTRAMUSCULAR; INTRAVENOUS
Status: DISCONTINUED | OUTPATIENT
Start: 2025-07-16 | End: 2025-07-16

## 2025-07-16 RX ORDER — CARBOPROST TROMETHAMINE 250 UG/ML
250 INJECTION, SOLUTION INTRAMUSCULAR
Status: DISCONTINUED | OUTPATIENT
Start: 2025-07-16 | End: 2025-07-18 | Stop reason: HOSPADM

## 2025-07-16 RX ORDER — CITRIC ACID/SODIUM CITRATE 334-500MG
30 SOLUTION, ORAL ORAL
Status: DISCONTINUED | OUTPATIENT
Start: 2025-07-16 | End: 2025-07-16 | Stop reason: HOSPADM

## 2025-07-16 RX ORDER — MISOPROSTOL 100 UG/1
400 TABLET ORAL
Status: DISCONTINUED | OUTPATIENT
Start: 2025-07-16 | End: 2025-07-18 | Stop reason: HOSPADM

## 2025-07-16 RX ORDER — LIDOCAINE HYDROCHLORIDE AND EPINEPHRINE 15; 5 MG/ML; UG/ML
3 INJECTION, SOLUTION EPIDURAL
Status: DISCONTINUED | OUTPATIENT
Start: 2025-07-16 | End: 2025-07-16 | Stop reason: HOSPADM

## 2025-07-16 RX ORDER — AMOXICILLIN 250 MG
2 CAPSULE ORAL
Status: DISCONTINUED | OUTPATIENT
Start: 2025-07-16 | End: 2025-07-18 | Stop reason: HOSPADM

## 2025-07-16 RX ORDER — FENTANYL CITRATE-0.9 % NACL/PF 10 MCG/ML
100 PLASTIC BAG, INJECTION (ML) INTRAVENOUS EVERY 5 MIN PRN
Status: DISCONTINUED | OUTPATIENT
Start: 2025-07-16 | End: 2025-07-16 | Stop reason: HOSPADM

## 2025-07-16 RX ORDER — OXYTOCIN 10 [USP'U]/ML
10 INJECTION, SOLUTION INTRAMUSCULAR; INTRAVENOUS
Status: DISCONTINUED | OUTPATIENT
Start: 2025-07-16 | End: 2025-07-18 | Stop reason: HOSPADM

## 2025-07-16 RX ORDER — ONDANSETRON 4 MG/1
4 TABLET, ORALLY DISINTEGRATING ORAL EVERY 6 HOURS PRN
Status: DISCONTINUED | OUTPATIENT
Start: 2025-07-16 | End: 2025-07-16 | Stop reason: HOSPADM

## 2025-07-16 RX ORDER — TRANEXAMIC ACID 10 MG/ML
1 INJECTION, SOLUTION INTRAVENOUS EVERY 30 MIN PRN
Status: DISCONTINUED | OUTPATIENT
Start: 2025-07-16 | End: 2025-07-18 | Stop reason: HOSPADM

## 2025-07-16 RX ORDER — ONDANSETRON 2 MG/ML
4 INJECTION INTRAMUSCULAR; INTRAVENOUS EVERY 6 HOURS PRN
Status: DISCONTINUED | OUTPATIENT
Start: 2025-07-16 | End: 2025-07-16 | Stop reason: HOSPADM

## 2025-07-16 RX ORDER — OXYTOCIN 10 [USP'U]/ML
10 INJECTION, SOLUTION INTRAMUSCULAR; INTRAVENOUS
Status: DISCONTINUED | OUTPATIENT
Start: 2025-07-16 | End: 2025-07-16 | Stop reason: HOSPADM

## 2025-07-16 RX ORDER — OXYTOCIN/0.9 % SODIUM CHLORIDE 30/500 ML
340 PLASTIC BAG, INJECTION (ML) INTRAVENOUS CONTINUOUS PRN
Status: DISCONTINUED | OUTPATIENT
Start: 2025-07-16 | End: 2025-07-18 | Stop reason: HOSPADM

## 2025-07-16 RX ORDER — MISOPROSTOL 100 UG/1
400 TABLET ORAL
Status: DISCONTINUED | OUTPATIENT
Start: 2025-07-16 | End: 2025-07-16 | Stop reason: HOSPADM

## 2025-07-16 RX ORDER — PROCHLORPERAZINE MALEATE 10 MG
10 TABLET ORAL EVERY 6 HOURS PRN
Status: DISCONTINUED | OUTPATIENT
Start: 2025-07-16 | End: 2025-07-16 | Stop reason: HOSPADM

## 2025-07-16 RX ORDER — OXYTOCIN 10 [USP'U]/ML
10 INJECTION, SOLUTION INTRAMUSCULAR; INTRAVENOUS
Status: DISCONTINUED | OUTPATIENT
Start: 2025-07-16 | End: 2025-07-16

## 2025-07-16 RX ORDER — HYDROCORTISONE 25 MG/G
CREAM TOPICAL 3 TIMES DAILY PRN
Status: DISCONTINUED | OUTPATIENT
Start: 2025-07-16 | End: 2025-07-18 | Stop reason: HOSPADM

## 2025-07-16 RX ORDER — POLYETHYLENE GLYCOL 3350 17 G/17G
17 POWDER, FOR SOLUTION ORAL DAILY PRN
Status: DISCONTINUED | OUTPATIENT
Start: 2025-07-16 | End: 2025-07-18 | Stop reason: HOSPADM

## 2025-07-16 RX ORDER — LOPERAMIDE HYDROCHLORIDE 2 MG/1
4 CAPSULE ORAL
Status: DISCONTINUED | OUTPATIENT
Start: 2025-07-16 | End: 2025-07-16 | Stop reason: HOSPADM

## 2025-07-16 RX ORDER — IBUPROFEN 400 MG/1
800 TABLET, FILM COATED ORAL EVERY 6 HOURS PRN
Status: DISCONTINUED | OUTPATIENT
Start: 2025-07-16 | End: 2025-07-18 | Stop reason: HOSPADM

## 2025-07-16 RX ORDER — NALOXONE HYDROCHLORIDE 0.4 MG/ML
0.4 INJECTION, SOLUTION INTRAMUSCULAR; INTRAVENOUS; SUBCUTANEOUS
Status: DISCONTINUED | OUTPATIENT
Start: 2025-07-16 | End: 2025-07-16

## 2025-07-16 RX ORDER — METHYLERGONOVINE MALEATE 0.2 MG/ML
200 INJECTION INTRAVENOUS
Status: DISCONTINUED | OUTPATIENT
Start: 2025-07-16 | End: 2025-07-18 | Stop reason: HOSPADM

## 2025-07-16 RX ORDER — LIDOCAINE 40 MG/G
CREAM TOPICAL
Status: DISCONTINUED | OUTPATIENT
Start: 2025-07-16 | End: 2025-07-16 | Stop reason: HOSPADM

## 2025-07-16 RX ORDER — IBUPROFEN 400 MG/1
800 TABLET, FILM COATED ORAL
Status: DISCONTINUED | OUTPATIENT
Start: 2025-07-16 | End: 2025-07-16

## 2025-07-16 RX ORDER — FENTANYL/BUPIVACAINE/NS/PF 2-1250MCG
PLASTIC BAG, INJECTION (ML) INJECTION CONTINUOUS PRN
Status: DISCONTINUED | OUTPATIENT
Start: 2025-07-16 | End: 2025-07-16

## 2025-07-16 RX ORDER — LOPERAMIDE HYDROCHLORIDE 2 MG/1
4 CAPSULE ORAL
Status: DISCONTINUED | OUTPATIENT
Start: 2025-07-16 | End: 2025-07-18 | Stop reason: HOSPADM

## 2025-07-16 RX ORDER — FENTANYL CITRATE 50 UG/ML
INJECTION, SOLUTION INTRAMUSCULAR; INTRAVENOUS PRN
Status: DISCONTINUED | OUTPATIENT
Start: 2025-07-16 | End: 2025-07-16

## 2025-07-16 RX ORDER — TERBUTALINE SULFATE 1 MG/ML
0.25 INJECTION SUBCUTANEOUS
Status: DISCONTINUED | OUTPATIENT
Start: 2025-07-16 | End: 2025-07-16 | Stop reason: HOSPADM

## 2025-07-16 RX ORDER — HYDROXYZINE HYDROCHLORIDE 10 MG/1
10-20 TABLET, FILM COATED ORAL EVERY 6 HOURS PRN
Status: DISCONTINUED | OUTPATIENT
Start: 2025-07-16 | End: 2025-07-16

## 2025-07-16 RX ORDER — TRANEXAMIC ACID 10 MG/ML
1 INJECTION, SOLUTION INTRAVENOUS EVERY 30 MIN PRN
Status: DISCONTINUED | OUTPATIENT
Start: 2025-07-16 | End: 2025-07-16 | Stop reason: HOSPADM

## 2025-07-16 RX ORDER — METHYLERGONOVINE MALEATE 0.2 MG/ML
200 INJECTION INTRAVENOUS
Status: DISCONTINUED | OUTPATIENT
Start: 2025-07-16 | End: 2025-07-16 | Stop reason: HOSPADM

## 2025-07-16 RX ORDER — ACETAMINOPHEN 325 MG/1
650 TABLET ORAL EVERY 4 HOURS PRN
Status: DISCONTINUED | OUTPATIENT
Start: 2025-07-16 | End: 2025-07-18 | Stop reason: HOSPADM

## 2025-07-16 RX ORDER — OXYTOCIN/0.9 % SODIUM CHLORIDE 30/500 ML
100-340 PLASTIC BAG, INJECTION (ML) INTRAVENOUS CONTINUOUS PRN
Status: DISCONTINUED | OUTPATIENT
Start: 2025-07-16 | End: 2025-07-16

## 2025-07-16 RX ORDER — NALOXONE HYDROCHLORIDE 0.4 MG/ML
0.2 INJECTION, SOLUTION INTRAMUSCULAR; INTRAVENOUS; SUBCUTANEOUS
Status: DISCONTINUED | OUTPATIENT
Start: 2025-07-16 | End: 2025-07-16

## 2025-07-16 RX ORDER — LOPERAMIDE HYDROCHLORIDE 2 MG/1
2 CAPSULE ORAL
Status: DISCONTINUED | OUTPATIENT
Start: 2025-07-16 | End: 2025-07-16 | Stop reason: HOSPADM

## 2025-07-16 RX ORDER — LOPERAMIDE HYDROCHLORIDE 2 MG/1
2 CAPSULE ORAL
Status: DISCONTINUED | OUTPATIENT
Start: 2025-07-16 | End: 2025-07-18 | Stop reason: HOSPADM

## 2025-07-16 RX ORDER — OXYTOCIN/0.9 % SODIUM CHLORIDE 30/500 ML
340 PLASTIC BAG, INJECTION (ML) INTRAVENOUS CONTINUOUS PRN
Status: DISCONTINUED | OUTPATIENT
Start: 2025-07-16 | End: 2025-07-16 | Stop reason: HOSPADM

## 2025-07-16 RX ORDER — METOCLOPRAMIDE 10 MG/1
10 TABLET ORAL EVERY 6 HOURS PRN
Status: DISCONTINUED | OUTPATIENT
Start: 2025-07-16 | End: 2025-07-16 | Stop reason: HOSPADM

## 2025-07-16 RX ORDER — NALBUPHINE HYDROCHLORIDE 10 MG/ML
2.5-5 INJECTION INTRAMUSCULAR; INTRAVENOUS; SUBCUTANEOUS EVERY 6 HOURS PRN
Status: DISCONTINUED | OUTPATIENT
Start: 2025-07-16 | End: 2025-07-16

## 2025-07-16 RX ORDER — BISACODYL 10 MG
10 SUPPOSITORY, RECTAL RECTAL DAILY PRN
Status: DISCONTINUED | OUTPATIENT
Start: 2025-07-16 | End: 2025-07-18 | Stop reason: HOSPADM

## 2025-07-16 RX ORDER — OXYTOCIN/0.9 % SODIUM CHLORIDE 30/500 ML
1-24 PLASTIC BAG, INJECTION (ML) INTRAVENOUS CONTINUOUS
Status: DISCONTINUED | OUTPATIENT
Start: 2025-07-16 | End: 2025-07-16 | Stop reason: HOSPADM

## 2025-07-16 RX ORDER — SODIUM CHLORIDE, SODIUM LACTATE, POTASSIUM CHLORIDE, CALCIUM CHLORIDE 600; 310; 30; 20 MG/100ML; MG/100ML; MG/100ML; MG/100ML
INJECTION, SOLUTION INTRAVENOUS CONTINUOUS PRN
Status: DISCONTINUED | OUTPATIENT
Start: 2025-07-16 | End: 2025-07-16 | Stop reason: HOSPADM

## 2025-07-16 RX ORDER — CARBOPROST TROMETHAMINE 250 UG/ML
250 INJECTION, SOLUTION INTRAMUSCULAR
Status: DISCONTINUED | OUTPATIENT
Start: 2025-07-16 | End: 2025-07-16 | Stop reason: HOSPADM

## 2025-07-16 RX ADMIN — Medication 1 MILLI-UNITS/MIN: at 06:43

## 2025-07-16 RX ADMIN — SODIUM CHLORIDE, SODIUM LACTATE, POTASSIUM CHLORIDE, AND CALCIUM CHLORIDE: .6; .31; .03; .02 INJECTION, SOLUTION INTRAVENOUS at 09:28

## 2025-07-16 RX ADMIN — BENZOCAINE AND LEVOMENTHOL: 200; 5 SPRAY TOPICAL at 19:58

## 2025-07-16 RX ADMIN — METHYLCELLULOSE 1000 MG: 500 TABLET ORAL at 19:58

## 2025-07-16 RX ADMIN — SODIUM CHLORIDE, SODIUM LACTATE, POTASSIUM CHLORIDE, AND CALCIUM CHLORIDE: .6; .31; .03; .02 INJECTION, SOLUTION INTRAVENOUS at 15:45

## 2025-07-16 RX ADMIN — Medication 100 MCG: at 09:31

## 2025-07-16 RX ADMIN — HYDROXYZINE HYDROCHLORIDE 20 MG: 10 TABLET, FILM COATED ORAL at 17:27

## 2025-07-16 RX ADMIN — HYDROXYZINE HYDROCHLORIDE 20 MG: 10 TABLET, FILM COATED ORAL at 10:36

## 2025-07-16 RX ADMIN — IBUPROFEN 800 MG: 400 TABLET, FILM COATED ORAL at 19:58

## 2025-07-16 RX ADMIN — FENTANYL CITRATE 50 MCG: 50 INJECTION INTRAMUSCULAR; INTRAVENOUS at 17:13

## 2025-07-16 RX ADMIN — LIDOCAINE HYDROCHLORIDE 20 ML: 10 INJECTION, SOLUTION INFILTRATION; PERINEURAL at 18:25

## 2025-07-16 RX ADMIN — Medication 5 ML/HR: at 09:25

## 2025-07-16 RX ADMIN — SODIUM CHLORIDE, SODIUM LACTATE, POTASSIUM CHLORIDE, AND CALCIUM CHLORIDE: .6; .31; .03; .02 INJECTION, SOLUTION INTRAVENOUS at 06:42

## 2025-07-16 ASSESSMENT — ACTIVITIES OF DAILY LIVING (ADL)
ADLS_ACUITY_SCORE: 27

## 2025-07-16 NOTE — PROGRESS NOTES
Intrapartum Progress Note    S: Patient is comfortable with epidural.     O: /58   Temp 97.7  F (36.5  C) (Oral)   Resp 18   LMP 2024   SpO2 100%    Gen: resting  Cvx: 3/80/-2    FHT: 135, mod variability, + accels, no decels  [place]: 4 contractions in 10 min    Membranes: AROM- clear    A/P: 25 year old  at 40w5d by 7w3d US admitted for elective IOL  FWB: Cat I, reactive. EFW 7.5 lbs  Labor: pitocin per protocol, now s/p AROM  Pain: epidural  GBS: negative  Rh: negative, needs Rhogam eval PP  Rubella: immune  Continue routine labor management.   Anticipate     Padma French MD 1:41 PM

## 2025-07-16 NOTE — L&D DELIVERY NOTE
OB Vaginal Delivery Note    Mya Gant MRN# [id number]   Age: 25 year old YOB: 2000       GA: 40w5d  GP:   Labor Complications: None  EBL:   mL  Delivery QBL:    Delivery Type: Vaginal, Spontaneous  ROM to Delivery Time: (Delivered) Hours: 5 Minutes: 4   Weight: 3.544 kg (7 lb 13 oz)   1 Minute 5 Minute 10 Minute   Apgar Totals: 9   9        DOMINIC CHOW;SONDRA. VEENA;ANGELA VICK    Delivery Details:  Mya Gant, a 25 year old  who was admitted at 40w5d for elective induction of labor. Her cervix was 3cm upon admission and she was started on pitocin. She received an epidural for labor analgesia. After establishment of a good contraction pattern and comfort with the epidural, she underwent AROM still at 3cm. She progressed slowly to 5cm, then to complete dilation over the next 2 hours. She pushed effectively for 15 minutes and delivered a male infant, ALYSSA position, without complication. Apgars 9/9. Weight 3544g. Placenta delivered spontaneously and appeared intact. Second degree laceration repaired in the standard fashion with 3-0 vicryl after infiltration with local anesthetic. .    Padma French MD FACOG  OB/GYN  2025 6:45 PM         Sam Gant [[id number]]      Labor Event Times      Active labor onset date: 25 Onset time:  4:25 PM   Dilation complete date: 25 Complete time:  5:55 PM   Start pushing date/time: 2025 18:03          Labor Length      1st Stage (hrs): 1 (min): 30   2nd Stage (hrs): 0 (min): 24   3rd Stage (hrs): 0 (min): 9          Labor Events     labor?: No   steroids: None  Labor Type: Induction/Cervical ripening, Augmentation  Predominate monitoring during 1st stage: continuous electronic fetal monitoring     Antibiotics received during labor?: No       Rupture date/time: 25 13:15   Rupture type: Artificial Rupture of Membranes  Fluid color: Clear  Fluid odor: Normal     Induction:  "Oxytocin, AROM  Induction date/time: 25     Cervical ripening date/time:      Indications for induction: Elective       Augmentation date/time: 25 06:43          Delivery/Placenta Date and Time      Delivery Date: 25 Delivery Time:  6:19 PM   Placenta Date/Time: 2025  6:28 PM  Oxytocin given at the time of delivery: after delivery of baby  Delivering clinician: Padma French MD   Other personnel present at delivery:  Provider Role   Delia Thurman, Farhan Calhoun., RN    Alyse Waters, RRT              Vaginal Counts              Needles Suture Needles Sponges (RETIRED) Instruments   Initial counts       Added to count       Relief counts       Final counts 1 1 6            Placed during labor Accounted for at the end of labor   FSE No NA   IUPC No NA   Cervidil No NA                  Final count performed by 2 team members:  Two Team Members   Padma Real      Final count correct?: Yes       Apgars    Living status: Living   1 Minute 5 Minute 10 Minute 15 Minute 20 Minute   Skin color: 1  1       Heart rate: 2  2       Reflex irritability: 2  2       Muscle tone: 2  2       Respiratory effort: 2  2       Total: 9  9       Apgars assigned by: KT       Cord      Vessels: 3 Vessels    Cord Complications: None               Cord Blood Disposition: Lab    Gases Sent?: No    Delayed cord clamping?: Yes    Cord Clamping Delay (seconds): 31-60 seconds    Stem cell collection?: No            Resuscitation    Methods: None       [place] Measurements      Weight: 7 lb 13 oz Length: 1' 7.5\"     Head circumference: 35.6 cm Chest circumference: 33 cm          Skin to Skin and Feeding Plan      Skin to skin initiation date/time: 1841    Skin to skin with: Mother  Skin to skin end date/time:            Labor Events and Shoulder Dystocia    Fetal Tracing Prior to Delivery: Category 1  Shoulder dystocia present?: Neg       Delivery (Maternal) (Provider to " Complete) ([id number])    Episiotomy: None  Perineal lacerations: 2nd Repaired?: Yes   Repair suture: 3-0 Vicryl  Genital tract inspection done: Pos       Blood Loss  Mother: Mya Gant #[id number]     Start of Mother's Information      Delivery Blood Loss   Intrapartum & Postpartum: 07/16/25 1625 - 07/16/25 1912    Delivery Admission: 07/16/25 0534 - 07/16/25 1912         Intrapartum & Postpartum Delivery Admission    Delivery QBL (mL) Hospital Encounter 377 mL 377 mL    Total  377 mL 377 mL               End of Mother's Information  Mother: Mya Gant #[id number]                Delivery - Provider to Complete ([id number])    Delivering clinician: Padma French MD  Delivery Type (Choose the 1 that will go to the Birth History): Vaginal, Spontaneous                         Other personnel:  Provider Role   Delia Thurman RN Kariana, Thompson., RN    Alyse Waters, RRT                     Placenta    Date/Time: 7/16/2025  6:28 PM  Removal: Spontaneous  Disposition: Hospital disposal             Anesthesia    Method: Epidural  Cervical dilation at placement: 0-3                    Presentation and Position    Presentation: Vertex    Position: Left Occiput Anterior                     Padma French MD

## 2025-07-16 NOTE — PROGRESS NOTES
Grecia monitor placed and not functioning well. Grecia removed, FHT doppler in the 140s. Patient requesting to be up to the bathroom.

## 2025-07-16 NOTE — H&P
[facility] Labor and Delivery History and Physical    Mya Gant MRN# [id number]   Age: 25 year old YOB: 2000     Date of Admission:  2025    Primary care provider: Gemini Alvarado           Chief Complaint:   Mya Gant is a 25 year old  at 40w5d by 7w3d US admitted for elective induction of labor. Has been cramping but not regular ctx.           Pregnancy history:     OBSTETRIC HISTORY:    OB History    Para Term  AB Living   3 1 1 0 1 1   SAB IAB Ectopic Multiple Live Births   1 0 0 0 1      # Outcome Date GA Lbr [place]/2nd Weight Sex Type Anes PTL Lv   3 Current            2 Term 23 39w0d 03:42 / 02:10 3.093 kg (6 lb 13.1 oz) F Vag-Vacuum EPI N ROGER      Name: Beverly      Apgar1: 8  Apgar5: 9   1 SAB 2022     SAB          EDC: Estimated Date of Delivery: 2025    Prenatal Labs:   Lab Results   Component Value Date    AS Positive (A) 2025    HEPBANG Nonreactive 12/10/2024    HGB 11.9 2025     GBS:  negative    Active Problem List  Patient Active Problem List   Diagnosis    Anxiety state    Normal delivery       Medication Prior to Admission  Medications Prior to Admission   Medication Sig Dispense Refill Last Dose/Taking    fish oil-omega-3 fatty acids 1000 MG capsule Take 2 g by mouth daily.       lactobacillus rhamnosus, GG, (CULTURELL) capsule Take 1 capsule by mouth daily.       Prenatal Vit-Fe Fumarate-FA (PRENATAL MULTIVITAMIN W/IRON) 27-0.8 MG tablet Take 1 tablet by mouth daily 90 tablet 3    .        Maternal Past Medical History:     Past Medical History:   Diagnosis Date    Anxiety disorder     Jaundice     Migraines     Pneumonia     History of left lower lobe pneumonia times two.    Polyhydramnios affecting pregnancy in third trimester 2023    Varicella      Past Surgical History:   Procedure Laterality Date    NO HISTORY OF SURGERY                         Family History:     Family History    Problem Relation Age of Onset    Other - See Comments Mother         Psychiatric illness,depression and anxiety    Anxiety Disorder Father     Anxiety Disorder Maternal Grandmother     Aortic aneurysm Maternal Grandmother     Cancer Maternal Grandfather      Family history reviewed and updated in [facility]            Social History:     Social History     Tobacco Use    Smoking status: Never    Smokeless tobacco: Never   Substance Use Topics    Alcohol use: Not Currently     Comment: occasional            Review of Systems:   The Review of Systems is negative other than noted in the HPI          Physical Exam:   Patient Vitals for the past 8 hrs:   BP Temp Temp src Resp SpO2   25 0542 126/74 98  F (36.7  C) Oral 18 98 %     Gen: resting in bed, NAD  CV: RRR  Resp: CTAB  Abd: gravid, soft, nontender  Ext: nontender    Cervix: 3cm by RN on admit  Membranes: intact  EFW: 7.5 lbs  Presentation:Cephalic    Fetal Heart Rate Tracin, mod variability, + accels. Unclear if decels or artifact with wireless monitoring. Will switch to US monitoring and tocometry.   Tocometer: irregular        Assessment:   Mya Gant is a 25 year old  at 40w5d admitted for elective induction of labor          Plan:   FWB: suspect category I but will switch monitoring to ensure no decels. EFW 7.5 lbs  Labor: pitocin per protocol. Will AROM after epidural  Pain: plans epidural  Rh negative: needs Rhogam eval PP  Rubella immune  GBS negative  Anticipate     Padma French MD

## 2025-07-16 NOTE — ANESTHESIA PREPROCEDURE EVALUATION
Anesthesia Pre-Procedure Evaluation    Patient: Mya Gant   MRN: [id number] : 2000          Procedure :          Past Medical History:   Diagnosis Date    Anxiety disorder     Jaundice     Migraines     Pneumonia     History of left lower lobe pneumonia times two.    Polyhydramnios affecting pregnancy in third trimester 2023    Varicella       Past Surgical History:   Procedure Laterality Date    NO HISTORY OF SURGERY        No Known Allergies   Social History     Tobacco Use    Smoking status: Never    Smokeless tobacco: Never   Substance Use Topics    Alcohol use: Not Currently     Comment: occasional      Wt Readings from Last 1 Encounters:   25 95.3 kg (210 lb)        Anesthesia Evaluation   Pt has had prior anesthetic. Type: Regional and OB Labor Epidural.        ROS/MED HX  ENT/Pulmonary:  - neg pulmonary ROS     Neurologic:  - neg neurologic ROS     Cardiovascular:  - neg cardiovascular ROS     METS/Exercise Tolerance: >4 METS    Hematologic:  - neg hematologic  ROS     Musculoskeletal:  - neg musculoskeletal ROS     GI/Hepatic:     (+) GERD,                   Renal/Genitourinary:  - neg Renal ROS     Endo:  - neg endo ROS     Psychiatric/Substance Use:  - neg psychiatric ROS     Infectious Disease:  - neg infectious disease ROS     Malignancy:       Other:              Physical Exam  Airway  Mallampati: I  TM distance: >3 FB  Neck ROM: full  Mouth opening: >= 4 cm    Cardiovascular - normal exam   Dental - normal exam  (+) Completely normal teeth      Pulmonary - normal exam      Neurological - normal exam  She appears awake, alert and oriented x3.    Other Findings       OUTSIDE LABS:  CBC:   Lab Results   Component Value Date    WBC 10.7 2025    WBC 8.0 04/15/2025    HGB 11.9 2025    HGB 12.3 04/15/2025    HCT 35.8 2025    HCT 36.8 04/15/2025     2025     04/15/2025     BMP:   Lab Results   Component Value Date     2023     (L)  "06/26/2023    POTASSIUM 4.2 12/26/2023    POTASSIUM 4.1 06/26/2023    CHLORIDE 103 12/26/2023    CHLORIDE 100 06/26/2023    CO2 26 12/26/2023    CO2 21 (L) 06/26/2023    BUN 16.6 12/26/2023    BUN 7.3 06/26/2023    CR 0.81 12/26/2023    CR 0.60 06/26/2023    GLC 91 12/26/2023    GLC 79 06/26/2023     COAGS: No results found for: \"PTT\", \"INR\", \"FIBR\"  POC:   Lab Results   Component Value Date    HCG Negative 08/07/2022    HCGS Negative 09/22/2022     HEPATIC:   Lab Results   Component Value Date    ALBUMIN 3.5 06/26/2023    PROTTOTAL 6.1 (L) 06/26/2023    ALT 20 06/26/2023    AST 21 06/26/2023    ALKPHOS 150 (H) 06/26/2023    BILITOTAL 0.3 06/26/2023     OTHER:   Lab Results   Component Value Date    CLARENCE 9.3 12/26/2023    MAG 1.9 01/23/2024    TSH 0.80 12/10/2024    CRP 3.5 08/07/2022       Anesthesia Plan    ASA Status:  2       Anesthesia Type: Epidural.        Consents    Anesthesia Plan(s) and associated risks, benefits, and realistic alternatives discussed. Questions answered and patient/representative(s) expressed understanding.     - Discussed:     - Discussed with:  Patient, Spouse               Postoperative Care         Comments:    Other Comments: Pt had a \"high epidural\" with nausea and shortness of breath.  Will not do a bolus and will start epidural rate at a lower dose           neg OB ROS.      REBEKAH WHITE, APRN CRNA    I have reviewed the pertinent notes and labs in the chart from the past 30 days and (re)examined the patient.  Any updates or changes from those notes are reflected in this note.    Clinically Significant Risk Factors Present on Admission                                              "

## 2025-07-16 NOTE — PROGRESS NOTES
LEO/MELINDA in paper strip only from admit to 0945 when centricity found by RN to be not connected to patient.

## 2025-07-16 NOTE — PROGRESS NOTES
Intrapartum Progress Note    S: Patient is comfortable with epidural.     O: /58   Temp 97.7  F (36.5  C) (Oral)   Resp 18   LMP 2024   SpO2 100%    Gen: resting in bed, NAD  Cvx: 5/90/-3    FHT: 140, mod variability, + accels, variable decels with contractions with recovery to baseline between contractions  [place]: 4 contractions in 10 min    Membranes: s/p AROM, clear    A/P: 25 year old  at 40w5d by 7w3d US admitted for elective IOL  FWB: Cat II for variable decels with contractions but quick recovery and maintaining moderate variability. EFW 7.5 lbs  Labor: pitocin per protocol, s/p AROM  Pain: epidural  GBS: negative  Rh: negative, needs Rhogam eval PP  Rubella: immune  Continue routine labor management.   Anticipate     Padma French MD 4:39 PM

## 2025-07-16 NOTE — PROGRESS NOTES
40w5d patient here for her scheduled elective induction. Patient admitted and oriented to room. Pitocin infusing.

## 2025-07-16 NOTE — ANESTHESIA PROCEDURE NOTES
"Epidural catheter Procedure Note    Pre-Procedure   Staff -        CRNA: Carmen Yañez APRN CRNA       Performed By: CRNA       Location: OB       Procedure Start/Stop Times: 7/16/2025 8:54 AM and 7/16/2025 9:18 AM       Pre-Anesthestic Checklist: patient identified, IV checked, risks and benefits discussed, informed consent, monitors and equipment checked, pre-op evaluation and at physician/surgeon's request  Timeout:       Correct Patient: Yes        Correct Procedure: Yes        Correct Site: Yes        Correct Position: Yes   Procedure Documentation  Procedure: epidural catheter         Diagnosis: labor       Patient Position: sitting       Patient Prep/Sterile Barriers: sterile gloves, mask, patient draped       Skin prep: Chloraprep       Local skin infiltrated with 5 mL of 1% lidocaine.        Insertion Site: L2-3. (midline approach).       Technique: LORT saline        NASH at 6 cm.       Needle Type: APIM [employer]y needle       Needle Gauge: 17.        Needle Length (Inches): 3.5        Catheter: 19 G.          Catheter threaded easily.           Threaded 12 cm at skin.         # of attempts: 2 (first attempt bone encountered and patient got out of position.  She had difficulty getting rounded in her back) and  # of redirects:     Assessment/Narrative         Paresthesias: No.       Test dose of 5 mL lidocaine 1.5% w/ 1:200,000 epinephrine at 09:18 CDT.         Test dose negative, 3 minutes after injection, for signs of intravascular, subdural, or intrathecal injection.       Insertion/Infusion Method: LORT saline       Aspiration negative for Heme or CSF via Epidural Catheter.    Medication(s) Administered   Medication Administration Time: 7/16/2025 8:54 AM      FOR [facility]) ONLY:   Pain Team Contact information: please page the Pain Team Via baseclick. Search \"Pain\". During daytime hours, please page the attending first. At night please page the resident first.      "

## 2025-07-17 VITALS
RESPIRATION RATE: 16 BRPM | OXYGEN SATURATION: 98 % | TEMPERATURE: 97.9 F | HEART RATE: 73 BPM | BODY MASS INDEX: 34.99 KG/M2 | HEIGHT: 65 IN | SYSTOLIC BLOOD PRESSURE: 100 MMHG | WEIGHT: 210 LBS | DIASTOLIC BLOOD PRESSURE: 62 MMHG

## 2025-07-17 LAB
HGB BLD-MCNC: 11 G/DL (ref 11.7–15.7)
MCV RBC AUTO: 86 FL (ref 78–100)

## 2025-07-17 PROCEDURE — 85018 HEMOGLOBIN: CPT | Performed by: OBSTETRICS & GYNECOLOGY

## 2025-07-17 PROCEDURE — 120N000001 HC R&B MED SURG/OB

## 2025-07-17 PROCEDURE — 36415 COLL VENOUS BLD VENIPUNCTURE: CPT | Performed by: OBSTETRICS & GYNECOLOGY

## 2025-07-17 PROCEDURE — 250N000013 HC RX MED GY IP 250 OP 250 PS 637: Performed by: OBSTETRICS & GYNECOLOGY

## 2025-07-17 RX ADMIN — ACETAMINOPHEN 650 MG: 325 TABLET ORAL at 15:20

## 2025-07-17 RX ADMIN — IBUPROFEN 800 MG: 400 TABLET, FILM COATED ORAL at 19:01

## 2025-07-17 RX ADMIN — ACETAMINOPHEN 650 MG: 325 TABLET ORAL at 00:46

## 2025-07-17 RX ADMIN — IBUPROFEN 800 MG: 400 TABLET, FILM COATED ORAL at 04:32

## 2025-07-17 RX ADMIN — METHYLCELLULOSE 1000 MG: 500 TABLET ORAL at 08:00

## 2025-07-17 RX ADMIN — ACETAMINOPHEN 650 MG: 325 TABLET ORAL at 08:00

## 2025-07-17 RX ADMIN — IBUPROFEN 800 MG: 400 TABLET, FILM COATED ORAL at 12:47

## 2025-07-17 ASSESSMENT — ACTIVITIES OF DAILY LIVING (ADL)
ADLS_ACUITY_SCORE: 31
ADLS_ACUITY_SCORE: 32
ADLS_ACUITY_SCORE: 31
ADLS_ACUITY_SCORE: 32
ADLS_ACUITY_SCORE: 31
ADLS_ACUITY_SCORE: 32
ADLS_ACUITY_SCORE: 31
ADLS_ACUITY_SCORE: 32
ADLS_ACUITY_SCORE: 31
ADLS_ACUITY_SCORE: 32
ADLS_ACUITY_SCORE: 31
ADLS_ACUITY_SCORE: 32
ADLS_ACUITY_SCORE: 31
ADLS_ACUITY_SCORE: 31

## 2025-07-17 NOTE — PLAN OF CARE
"    Mom doing well postpartum. Vitals WNL. Pain managed with oral analgesics and hot packs. Scant bleeding. Fundus firm, 1 below U. Voiding spontaneously. IV removed during shift. Tolerating oral hydration and food. Breastfeeding every 2-3 hours. Positive bonding noted with mom and baby. Dad attentive at bedside. Education and reassurance provided.     /62 (BP Location: Right arm, Patient Position: Sitting, Cuff Size: Adult Regular)   Pulse 73   Temp 97.9  F (36.6  C) (Temporal)   Resp 16   Ht 1.651 m (5' 5\")   Wt 95.3 kg (210 lb)   LMP 09/29/2024   SpO2 98%   Breastfeeding Unknown   BMI 34.95 kg/m        Melia Ochoa RN on 7/17/2025 at 6:31 PM      "

## 2025-07-17 NOTE — ANESTHESIA POSTPROCEDURE EVALUATION
Patient: Mya Gant    Procedure: * No procedures listed *       Anesthesia Type:  Epidural    Note:  Disposition: Inpatient   Postop Pain Control: Uneventful            Sign Out: Well controlled pain   PONV: No   Neuro/Psych: Uneventful            Sign Out: Acceptable/Baseline neuro status   Airway/Respiratory: Uneventful            Sign Out: Acceptable/Baseline resp. status   CV/Hemodynamics: Uneventful            Sign Out: Acceptable CV status; No obvious hypovolemia; No obvious fluid overload   Other NRE: NONE   DID A NON-ROUTINE EVENT OCCUR? No    Event details/Postop Comments:  Patient rounding following delivery and epidural removal. Patient reports that she has been up ambulating and using restroom without issues. Patient denies fever, chills, or headache. Patient is overall satisfied with epidural services.            Last vitals:  Vitals:    07/17/25 0437 07/17/25 1006 07/17/25 1247   BP: 102/58 113/65 107/56   Pulse:  81 67   Resp: 16 16 16   Temp: 98.6  F (37  C) 97.6  F (36.4  C) 97.7  F (36.5  C)   SpO2:  98% 97%       Electronically Signed By: JOSE ANTONIO Colmenares CRNA  July 17, 2025  4:29 PM

## 2025-07-17 NOTE — PROGRESS NOTES
Patient complete 1755, began pushing with MD at bedside at 1802. Patient pushed over the next 9 contractions approximately every 2 minutes apart. FHT in the 150s between contractions. Decelerations noted with contractions, FHT return to baseline after each pushing effort.

## 2025-07-17 NOTE — PROGRESS NOTES
RN in room multiple times throughout shift to assist with breastfeeding latch. Baby tends to latch shallow and mother vocalized discomfort. Mother is good about unlatching baby and trying again. At 1500, R nipple is becoming red and small white blister appears to be forming. Educated patient on correct latching technique and baby has performed excellent latch multiple times. Baby observed to be more lazy at breast post circumcision.

## 2025-07-17 NOTE — PROGRESS NOTES
OB/GYN Postpartum Note      S:  Patient is feeling well this morning.  Lochia = heavy menses.  Eating and drinking without nausea.  Ambulating without difficulty.   Pain is well controlled.  Breastfeeding without questions or concerns.      O:   Vitals:    25 2350 25 0020 25 0436 25 0437   BP: (!) 85/43 100/51  102/58   BP Location:       Patient Position:       Cuff Size:       Resp:    16   Temp:    98.6  F (37  C)   TempSrc:    Temporal   SpO2:   96%    Weight:       Height:         General: resting in bed, in NAD  Resp: nonlabored  Abdomen: deferred, breastfeeding    Hemoglobin   Date Value Ref Range Status   2025 11.9 11.7 - 15.7 g/dL Final   2017 14.8 12.0 - 16.0 g/dL    ]    A: Ms. Mya Gant is a 25 year old  PPD #1 s/p     P:  GI: tolerating regular diet  Feeding: breast  Contraception: discuss at PP visit  Rubella Immune  Rh negative, Infant O negative- Rhogam not indicated  Disposition: routine cares, discharge tomorrow    Padma French MD FACOG  OB/GYN  2025 5:44 AM

## 2025-07-17 NOTE — PLAN OF CARE
"Mya Gant is doing well. Vitals are stable: /58   Temp 98.6  F (37  C) (Temporal)   Resp 16   Ht 1.651 m (5' 5\")   Wt 95.3 kg (210 lb)   LMP 2024   SpO2 96%   Breastfeeding Unknown   BMI 34.95 kg/m      FF, midline and 1 below. Bleeding is light with no clots noted. Pain has been well managed with PRN oral analgesics. Voiding without difficulty. Patient IS passing gas. Independent in room. Tolerating a Regular diet and oral rehydration. IV is Saline Locked. breastfeeding independently. Bonding well with . Patient has verbalized understanding of routine cares and warning signs. Anticipated discharge home tomorrow.     Ulices Galeas RN 25 7:03 AM              "

## 2025-07-17 NOTE — PROGRESS NOTES
Single viable baby GENDER: male born via DELIVERY HISTORY: Vaginal on 07/16/25 at 1819. Delivered by Dr. French. Spontaneous respirations, with vigorous cry.   Induced  Labor at 40+5 weeks gestation. Apgars 9/9  Baby placed on mother's abdomen for skin to skin bonding.  ID bands applied to baby, mother, and father. HUGS tag applied to baby.  Will continue to monitor and provide interventions as needed.

## 2025-07-17 NOTE — PROGRESS NOTES
Mya Gant was out of bed at 11:01 PM via stand by assist. Tolerated well with slight dizziness. Voided 400 mL's mL's. Linen change completed. Fundus firm, 2 below and bleeding small. No clots noted. Education completed on PP cares.     Ulices Galeas, RN 07/16/25 11:02 PM

## 2025-07-18 VITALS
RESPIRATION RATE: 16 BRPM | HEIGHT: 65 IN | DIASTOLIC BLOOD PRESSURE: 65 MMHG | SYSTOLIC BLOOD PRESSURE: 110 MMHG | WEIGHT: 207.9 LBS | OXYGEN SATURATION: 97 % | BODY MASS INDEX: 34.64 KG/M2 | TEMPERATURE: 97.6 F | HEART RATE: 73 BPM

## 2025-07-18 PROCEDURE — 250N000013 HC RX MED GY IP 250 OP 250 PS 637: Performed by: OBSTETRICS & GYNECOLOGY

## 2025-07-18 RX ADMIN — METHYLCELLULOSE 1000 MG: 500 TABLET ORAL at 08:26

## 2025-07-18 RX ADMIN — IBUPROFEN 800 MG: 400 TABLET, FILM COATED ORAL at 02:51

## 2025-07-18 ASSESSMENT — ACTIVITIES OF DAILY LIVING (ADL)
ADLS_ACUITY_SCORE: 31

## 2025-07-18 NOTE — PLAN OF CARE
VSS. Assessment WNL. Patient has minimal amounts of cramping pain with breast feeding that has been relieved with motrin. Fundus firm, bleeding scant. A few small clots. Patient breast feeding baby every 2-3 hours overnight.

## 2025-07-18 NOTE — PROGRESS NOTES
Patient discharged to home with  and baby  at 1030.  She voiced understanding of all follow up appointments and when to be concerned.

## 2025-07-18 NOTE — PLAN OF CARE
Goal Outcome Evaluation:      Plan of Care Reviewed With: patient    Overall Patient Progress: improvingOverall Patient Progress: improving    Outcome Evaluation: Mya ESTRADA LIONEL Gant is doing well after Vaginal birth. Fundus is firm with light bleeding and no clots. Patient is breast feeding well. Patient has minimal amounts of pain that is well managed with oral analgesics. Patient is ambulating independently in room. She is voiding, spontaneously and without difficulty. Bowel sounds are active. She is taking Fiber supplement. Patient is Rh compatible with baby.. Patient has verbalized understanding of routine cares and warning signs. Positive bonding behaviors noted.

## 2025-07-18 NOTE — PROGRESS NOTES
OB/GYN Postpartum Note      S:  Patient is feeling well this morning.  Lochia = menses.  Eating and drinking without nausea.  Ambulating without difficulty.   Pain is well controlled.  Breastfeeding going okay- painful nipples because baby has a lip tie.     O:   Vitals:    25 1006 25 1247 25 1750 25 0245   BP: 113/65 107/56 100/62 115/55   BP Location: Right arm Right arm Right arm    Patient Position: Semi-Montague's Semi-Montague's Sitting    Cuff Size: Adult Regular Adult Regular Adult Regular    Pulse: 81 67 73    Resp: 16 16 16 16   Temp: 97.6  F (36.4  C) 97.7  F (36.5  C) 97.9  F (36.6  C) 97.2  F (36.2  C)   TempSrc: Temporal Temporal Temporal Temporal   SpO2: 98% 97% 98%    Weight:       Height:         General: resting in bed, in NAD  Resp: nonlabored  Abdomen: soft, nontender, moderately distended and tympanic  Fundus firm at umbilicus-1  Extremities: 2+ edema in BLE    Hemoglobin   Date Value Ref Range Status   2025 11.0 (L) 11.7 - 15.7 g/dL Final   2017 14.8 12.0 - 16.0 g/dL    ]    A: Ms. Mya Gant is a 25 year old  PPD #2 s/p     P:  GI: tolerating regular diet  Feeding: breast  Contraception: discuss at PP visit  Rubella Immune  Rh negative, Infant O negative- Rhogam not indicated  Disposition: routine cares, discharge today    Padma French MD FACOG  OB/GYN  2025 8:16 AM

## 2025-07-18 NOTE — DISCHARGE SUMMARY
VAGINAL DELIVERY DISCHARGE SUMMARY    Admit date: 2025  Discharge date: 2025     Admit Dx:   - 25 year old  at 40w5d    - elective IOL    Discharge Dx:  - Same as above, s/p     Procedures:  - Spontaneous vaginal delivery  - Epidural analgesia    Admit HPI:  Ms. Mya Gant is a   at 40w5d  who was admitted for elective induction of labor on 2025. Please see her admit H&P for full details of her PMH, PSH, Meds, Allergies and exam on admit.    Hospital course:  Mya Gant was admitted to the hospital on 2025 for the above listed indications.  Her cervix was 3cm upon admission and she was started on pitocin. She received an epidural for labor analgesia. After establishment of a good contraction pattern and comfort with the epidural, she underwent AROM still at 3cm. She progressed slowly to 5cm, then to complete dilation over the next 2 hours. She pushed effectively for 15 minutes and delivered a male infant, ALYSSA position, without complication. Apgars 9/9. Weight 3544g. Placenta delivered spontaneously and appeared intact. Second degree laceration repaired in the standard fashion with 3-0 vicryl after infiltration with local anesthetic. .  Please see her Delivery Summary for full details regarding her delivery.    Her postpartum course was complicated by nothing. On PPD#2, she was meeting all of her postpartum goals and deemed stable for discharge. She was voiding without difficulty, tolerating a regular diet without nausea and vomiting, her pain was well controlled on oral pain medicines and her lochia was appropriate. Her hemoglobin prior to delivery was 11.9 and after delivery was 11.0. Her Rh status was negative, Infant Rh negative, and Rhogam was not indicated.     Discharge Medications:  Current Discharge Medication List        CONTINUE these medications which have NOT CHANGED    Details   fish oil-omega-3 fatty acids 1000 MG capsule Take 2 g by mouth daily.       lactobacillus rhamnosus, GG, (CULTURELL) capsule Take 1 capsule by mouth daily.      Prenatal Vit-Fe Fumarate-FA (PRENATAL MULTIVITAMIN W/IRON) 27-0.8 MG tablet Take 1 tablet by mouth daily  Qty: 90 tablet, Refills: 3    Associated Diagnoses: Missed menses             Discharge/Disposition:  Mya Gant was discharged to home in stable condition with the following instructions/medications:  1) Call for temperature > 100.4, bright red vaginal bleeding >1 pad an hour x 2 hours, foul smelling vaginal discharge, pain not controlled by usual oral pain meds, persistent nausea and vomiting not controlled on medications  2) She will discuss contraception at PP visit  3) For feeding she decided to breastfeed.  4) She was instructed to follow-up in 2 and 6 weeks for a routine postpartum visit.      Padma French MD  OBGYN  7/18/2025 8:17 AM

## 2025-07-21 ENCOUNTER — HOSPITAL ENCOUNTER (OUTPATIENT)
Dept: OBGYN | Facility: OTHER | Age: 25
Discharge: HOME | End: 2025-07-21
Attending: OBSTETRICS & GYNECOLOGY
Payer: COMMERCIAL

## 2025-07-21 ENCOUNTER — LACTATION ENCOUNTER (OUTPATIENT)
Dept: OBGYN | Facility: OTHER | Age: 25
End: 2025-07-21

## 2025-07-21 PROCEDURE — S9443 LACTATION CLASS: HCPCS | Performed by: REGISTERED NURSE

## 2025-07-21 NOTE — LACTATION NOTE
This note was copied from a baby's chart.  Outpatient Lactation Visit    Donaldo Gant  [id number]    Consultation Date: 2025     Reason for Lactation Referral: Initial Lactation Consult    Baby's : 2025    Baby's Current Age: 5 day old  Baby's Gestational Age: Gestational Age: 40w5d    Primary Care Provider: Katharine Sandra    Presenting Problem (concerns as stated by parent): no concerns    MATERNAL HISTORY   History of Breast Surgery: no  Breast Changes During Pregnancy: normal enlargement  Breast Feeding History: nursed first child for 13 months  Maternal Meds: daily prenatal vitamin  Pregnancy Complications: none  Anesthesia during labor: epidural    MATERNAL ASSESSMENT    Breast Size: average, symmetrical, soft after feeding and filling prior to feeding  Nipple Appearance - Left: slightly cracked, with signs of healing, education on further healing techniques provided  Nipple Appearance - Right: slightly cracked, with signs of healing, education on further healing techniques provided  Nipple Erectility - Left: erect with stimulation  Nipple Erectility - Right: erect with stimulation  Areolas Compressibility: soft  Nipple Size: average  Special Equipment Used: none  Day mother reports milk came in:  Day 3    INFANT ASSESSMENT    Oral Anatomy  Mouth: normal  Palate: normal  Jaw: normal  Tongue: normal  Frenulum: normal   Digital Suck Exam: root    FEEDING   Feeding Time: aggressively for 25 minutes  Position:  cradle  Effort to Latch: awake and alert, latched easily  Duration of Breast Feeding: Right Breast: 15; Left Breast: 10  Results: excellent breast feed    Volume of Intake:  Birth Weight: 7 lb 13 oz  Hospital discharge weight: 7 lb 2.2 oz  Today's Weight 7 lb 5.2 oz  Total Intake: 1.8 oz  Output: 3-4 soil diapers in last 24 hours, 3-4 wet diapers in last 24 hours    LATCH Score:   Latch: 2 - Good Latch  Audible Swallowin - Spontaneous & frequent  Type of Nipple: (Breast/Nipple) 2 -  Everted  Comfort: 2 - Soft, Nontender  Hold: 2 - No Assist   Total LATCH Score:  10    FEEDING PLAN    Home Feeding Plan: Continue to feed on demand when  elicits feeding cues with deep latch.  Babe should be eating 8-12 times in a 24 hour period.  Exclusivity explained and encouraged in the early weeks to establish breastfeeding and order in milk supply.  Rooming-in encouraged with explanation of the benefits.  Continue to apply expressed breast milk and Lanolin cream to nipples after feedings for healing and comfort.  Postpartum breastfeeding assessment completed and education provided.  Items included in the education are:   proper positioning and latch  effectiveness of feeding  manual expression  handling and storing breastmilk  maintenance of breastfeeding for the first 6 months  sign/symptoms of infant feeding issues requiring referral to qualified health care provider    LACTATION COMMENTS   Deep latch explained for proper positioning of breast in infant's mouth, maximizing milk transfer and comfort.  Reassurance and encouragement provided in regard to mom's concerns about milk supply.  Follow-up support information provided.  Parents plan to keep  Well-Child Check with Katharine Sandra as scheduled for 2 week well child check.      Face-to-face Time: 60 minutes with assessment and education.    Irma Baltazar, ROGELIO  2025  12:44 PM

## 2025-08-25 ENCOUNTER — HOSPITAL ENCOUNTER (OUTPATIENT)
Dept: OBGYN | Facility: OTHER | Age: 25
Discharge: HOME | End: 2025-08-25
Attending: OBSTETRICS & GYNECOLOGY
Payer: COMMERCIAL

## 2025-08-25 ENCOUNTER — LACTATION ENCOUNTER (OUTPATIENT)
Dept: OBGYN | Facility: OTHER | Age: 25
End: 2025-08-25

## 2025-08-25 PROCEDURE — S9443 LACTATION CLASS: HCPCS | Performed by: REGISTERED NURSE

## 2025-08-27 ENCOUNTER — PRENATAL OFFICE VISIT (OUTPATIENT)
Dept: OBGYN | Facility: OTHER | Age: 25
End: 2025-08-27
Attending: OBSTETRICS & GYNECOLOGY
Payer: COMMERCIAL

## 2025-08-27 VITALS
HEIGHT: 65 IN | OXYGEN SATURATION: 96 % | BODY MASS INDEX: 33.07 KG/M2 | DIASTOLIC BLOOD PRESSURE: 64 MMHG | SYSTOLIC BLOOD PRESSURE: 112 MMHG | HEART RATE: 59 BPM | WEIGHT: 198.5 LBS | RESPIRATION RATE: 16 BRPM

## 2025-08-27 ASSESSMENT — PAIN SCALES - GENERAL: PAINLEVEL_OUTOF10: NO PAIN (0)

## (undated) RX ORDER — LIDOCAINE HYDROCHLORIDE 10 MG/ML
INJECTION, SOLUTION INFILTRATION; PERINEURAL
Status: DISPENSED
Start: 2018-11-02

## (undated) RX ORDER — FENTANYL CITRATE 50 UG/ML
INJECTION, SOLUTION INTRAMUSCULAR; INTRAVENOUS
Status: DISPENSED
Start: 2025-07-16

## (undated) RX ORDER — LIDOCAINE HYDROCHLORIDE 10 MG/ML
INJECTION, SOLUTION EPIDURAL; INFILTRATION; INTRACAUDAL; PERINEURAL
Status: DISPENSED
Start: 2021-11-12